# Patient Record
Sex: FEMALE | Race: WHITE | Employment: OTHER
[De-identification: names, ages, dates, MRNs, and addresses within clinical notes are randomized per-mention and may not be internally consistent; named-entity substitution may affect disease eponyms.]

---

## 2019-01-01 ENCOUNTER — HOME CARE VISIT (OUTPATIENT)
Dept: SCHEDULING | Facility: HOME HEALTH | Age: 84
End: 2019-01-01
Payer: MEDICARE

## 2019-01-01 ENCOUNTER — HOSPITAL ENCOUNTER (INPATIENT)
Age: 84
LOS: 3 days | Discharge: HOME HEALTH | DRG: 189 | End: 2019-05-20
Attending: EMERGENCY MEDICINE | Admitting: INTERNAL MEDICINE
Payer: MEDICARE

## 2019-01-01 ENCOUNTER — TELEPHONE (OUTPATIENT)
Dept: FAMILY MEDICINE CLINIC | Age: 84
End: 2019-01-01

## 2019-01-01 ENCOUNTER — DOCUMENTATION ONLY (OUTPATIENT)
Dept: FAMILY MEDICINE CLINIC | Age: 84
End: 2019-01-01

## 2019-01-01 ENCOUNTER — HOME CARE VISIT (OUTPATIENT)
Dept: HOSPICE | Facility: HOSPICE | Age: 84
End: 2019-01-01
Payer: MEDICARE

## 2019-01-01 ENCOUNTER — HOME VISIT (OUTPATIENT)
Dept: FAMILY MEDICINE CLINIC | Age: 84
End: 2019-01-01

## 2019-01-01 ENCOUNTER — HOSPICE ADMISSION (OUTPATIENT)
Dept: HOSPICE | Facility: HOSPICE | Age: 84
End: 2019-01-01
Payer: MEDICARE

## 2019-01-01 ENCOUNTER — HOME HEALTH ADMISSION (OUTPATIENT)
Dept: HOME HEALTH SERVICES | Facility: HOME HEALTH | Age: 84
End: 2019-01-01
Payer: MEDICARE

## 2019-01-01 ENCOUNTER — TELEPHONE (OUTPATIENT)
Dept: PALLATIVE CARE | Age: 84
End: 2019-01-01

## 2019-01-01 ENCOUNTER — APPOINTMENT (OUTPATIENT)
Dept: GENERAL RADIOLOGY | Age: 84
DRG: 812 | End: 2019-01-01
Attending: EMERGENCY MEDICINE
Payer: MEDICARE

## 2019-01-01 ENCOUNTER — APPOINTMENT (OUTPATIENT)
Dept: ULTRASOUND IMAGING | Age: 84
DRG: 189 | End: 2019-01-01
Attending: INTERNAL MEDICINE
Payer: MEDICARE

## 2019-01-01 ENCOUNTER — HOSPITAL ENCOUNTER (INPATIENT)
Age: 84
LOS: 1 days | Discharge: HOME | DRG: 812 | End: 2019-09-25
Attending: EMERGENCY MEDICINE | Admitting: HOSPITALIST
Payer: MEDICARE

## 2019-01-01 ENCOUNTER — HOSPITAL ENCOUNTER (EMERGENCY)
Age: 84
Discharge: HOME | End: 2019-11-28
Attending: EMERGENCY MEDICINE
Payer: OTHER MISCELLANEOUS

## 2019-01-01 ENCOUNTER — DOCUMENTATION ONLY (OUTPATIENT)
Dept: CASE MANAGEMENT | Age: 84
End: 2019-01-01

## 2019-01-01 ENCOUNTER — APPOINTMENT (OUTPATIENT)
Dept: NON INVASIVE DIAGNOSTICS | Age: 84
DRG: 812 | End: 2019-01-01
Attending: INTERNAL MEDICINE
Payer: MEDICARE

## 2019-01-01 ENCOUNTER — HOME CARE VISIT (OUTPATIENT)
Dept: HOME HEALTH SERVICES | Facility: HOME HEALTH | Age: 84
End: 2019-01-01
Payer: MEDICARE

## 2019-01-01 ENCOUNTER — APPOINTMENT (OUTPATIENT)
Dept: CT IMAGING | Age: 84
DRG: 189 | End: 2019-01-01
Attending: INTERNAL MEDICINE
Payer: MEDICARE

## 2019-01-01 ENCOUNTER — APPOINTMENT (OUTPATIENT)
Dept: CT IMAGING | Age: 84
DRG: 812 | End: 2019-01-01
Attending: EMERGENCY MEDICINE
Payer: MEDICARE

## 2019-01-01 ENCOUNTER — OFFICE VISIT (OUTPATIENT)
Dept: FAMILY MEDICINE CLINIC | Age: 84
End: 2019-01-01

## 2019-01-01 ENCOUNTER — APPOINTMENT (OUTPATIENT)
Dept: GENERAL RADIOLOGY | Age: 84
DRG: 189 | End: 2019-01-01
Attending: EMERGENCY MEDICINE
Payer: MEDICARE

## 2019-01-01 ENCOUNTER — APPOINTMENT (OUTPATIENT)
Dept: NON INVASIVE DIAGNOSTICS | Age: 84
DRG: 189 | End: 2019-01-01
Attending: EMERGENCY MEDICINE
Payer: MEDICARE

## 2019-01-01 VITALS
OXYGEN SATURATION: 93 % | HEART RATE: 86 BPM | RESPIRATION RATE: 19 BRPM | DIASTOLIC BLOOD PRESSURE: 52 MMHG | SYSTOLIC BLOOD PRESSURE: 100 MMHG

## 2019-01-01 VITALS
HEART RATE: 81 BPM | TEMPERATURE: 97.2 F | RESPIRATION RATE: 18 BRPM | DIASTOLIC BLOOD PRESSURE: 80 MMHG | OXYGEN SATURATION: 93 % | SYSTOLIC BLOOD PRESSURE: 110 MMHG

## 2019-01-01 VITALS
DIASTOLIC BLOOD PRESSURE: 58 MMHG | HEIGHT: 55 IN | BODY MASS INDEX: 46.29 KG/M2 | OXYGEN SATURATION: 97 % | RESPIRATION RATE: 22 BRPM | HEART RATE: 80 BPM | SYSTOLIC BLOOD PRESSURE: 107 MMHG | WEIGHT: 200 LBS

## 2019-01-01 VITALS
SYSTOLIC BLOOD PRESSURE: 140 MMHG | HEART RATE: 70 BPM | DIASTOLIC BLOOD PRESSURE: 70 MMHG | OXYGEN SATURATION: 97 % | RESPIRATION RATE: 22 BRPM

## 2019-01-01 VITALS
TEMPERATURE: 98.7 F | DIASTOLIC BLOOD PRESSURE: 60 MMHG | RESPIRATION RATE: 20 BRPM | HEART RATE: 81 BPM | OXYGEN SATURATION: 98 % | SYSTOLIC BLOOD PRESSURE: 120 MMHG

## 2019-01-01 VITALS
SYSTOLIC BLOOD PRESSURE: 122 MMHG | HEART RATE: 90 BPM | TEMPERATURE: 98.4 F | DIASTOLIC BLOOD PRESSURE: 72 MMHG | OXYGEN SATURATION: 90 % | RESPIRATION RATE: 18 BRPM

## 2019-01-01 VITALS
TEMPERATURE: 97.3 F | OXYGEN SATURATION: 94 % | RESPIRATION RATE: 20 BRPM | HEART RATE: 81 BPM | SYSTOLIC BLOOD PRESSURE: 150 MMHG | DIASTOLIC BLOOD PRESSURE: 80 MMHG

## 2019-01-01 VITALS
TEMPERATURE: 98.1 F | OXYGEN SATURATION: 95 % | DIASTOLIC BLOOD PRESSURE: 62 MMHG | HEART RATE: 84 BPM | RESPIRATION RATE: 18 BRPM | SYSTOLIC BLOOD PRESSURE: 118 MMHG

## 2019-01-01 VITALS
HEART RATE: 78 BPM | OXYGEN SATURATION: 99 % | RESPIRATION RATE: 17 BRPM | TEMPERATURE: 97.8 F | DIASTOLIC BLOOD PRESSURE: 68 MMHG | SYSTOLIC BLOOD PRESSURE: 142 MMHG

## 2019-01-01 VITALS
RESPIRATION RATE: 20 BRPM | HEART RATE: 82 BPM | TEMPERATURE: 98 F | SYSTOLIC BLOOD PRESSURE: 146 MMHG | OXYGEN SATURATION: 96 % | DIASTOLIC BLOOD PRESSURE: 82 MMHG

## 2019-01-01 VITALS
RESPIRATION RATE: 20 BRPM | OXYGEN SATURATION: 96 % | HEART RATE: 110 BPM | DIASTOLIC BLOOD PRESSURE: 60 MMHG | SYSTOLIC BLOOD PRESSURE: 110 MMHG

## 2019-01-01 VITALS
DIASTOLIC BLOOD PRESSURE: 70 MMHG | RESPIRATION RATE: 19 BRPM | HEART RATE: 83 BPM | OXYGEN SATURATION: 93 % | TEMPERATURE: 97.5 F | SYSTOLIC BLOOD PRESSURE: 145 MMHG

## 2019-01-01 VITALS
RESPIRATION RATE: 20 BRPM | TEMPERATURE: 97.3 F | HEART RATE: 58 BPM | SYSTOLIC BLOOD PRESSURE: 120 MMHG | OXYGEN SATURATION: 96 % | DIASTOLIC BLOOD PRESSURE: 67 MMHG

## 2019-01-01 VITALS
DIASTOLIC BLOOD PRESSURE: 64 MMHG | OXYGEN SATURATION: 97 % | SYSTOLIC BLOOD PRESSURE: 122 MMHG | HEART RATE: 82 BPM | RESPIRATION RATE: 20 BRPM

## 2019-01-01 VITALS
HEART RATE: 109 BPM | OXYGEN SATURATION: 97 % | DIASTOLIC BLOOD PRESSURE: 55 MMHG | RESPIRATION RATE: 14 BRPM | TEMPERATURE: 98.7 F | SYSTOLIC BLOOD PRESSURE: 110 MMHG

## 2019-01-01 VITALS
TEMPERATURE: 97.4 F | DIASTOLIC BLOOD PRESSURE: 60 MMHG | SYSTOLIC BLOOD PRESSURE: 130 MMHG | OXYGEN SATURATION: 96 % | HEART RATE: 80 BPM | RESPIRATION RATE: 18 BRPM

## 2019-01-01 VITALS
HEART RATE: 88 BPM | OXYGEN SATURATION: 90 % | TEMPERATURE: 98 F | HEIGHT: 55 IN | SYSTOLIC BLOOD PRESSURE: 130 MMHG | WEIGHT: 194.9 LBS | RESPIRATION RATE: 20 BRPM | DIASTOLIC BLOOD PRESSURE: 74 MMHG | BODY MASS INDEX: 45.11 KG/M2

## 2019-01-01 VITALS
OXYGEN SATURATION: 99 % | HEART RATE: 63 BPM | DIASTOLIC BLOOD PRESSURE: 60 MMHG | SYSTOLIC BLOOD PRESSURE: 118 MMHG | RESPIRATION RATE: 19 BRPM

## 2019-01-01 VITALS
DIASTOLIC BLOOD PRESSURE: 72 MMHG | RESPIRATION RATE: 12 BRPM | SYSTOLIC BLOOD PRESSURE: 116 MMHG | HEART RATE: 84 BPM | TEMPERATURE: 97.4 F | OXYGEN SATURATION: 97 %

## 2019-01-01 VITALS
OXYGEN SATURATION: 93 % | HEART RATE: 80 BPM | SYSTOLIC BLOOD PRESSURE: 145 MMHG | RESPIRATION RATE: 20 BRPM | TEMPERATURE: 97.5 F | DIASTOLIC BLOOD PRESSURE: 70 MMHG

## 2019-01-01 VITALS
RESPIRATION RATE: 20 BRPM | HEART RATE: 77 BPM | OXYGEN SATURATION: 95 % | SYSTOLIC BLOOD PRESSURE: 140 MMHG | DIASTOLIC BLOOD PRESSURE: 60 MMHG

## 2019-01-01 VITALS
RESPIRATION RATE: 14 BRPM | TEMPERATURE: 98.7 F | DIASTOLIC BLOOD PRESSURE: 70 MMHG | HEART RATE: 81 BPM | SYSTOLIC BLOOD PRESSURE: 122 MMHG | OXYGEN SATURATION: 96 %

## 2019-01-01 VITALS
SYSTOLIC BLOOD PRESSURE: 124 MMHG | OXYGEN SATURATION: 97 % | DIASTOLIC BLOOD PRESSURE: 62 MMHG | TEMPERATURE: 98.9 F | HEART RATE: 71 BPM | RESPIRATION RATE: 20 BRPM

## 2019-01-01 VITALS
SYSTOLIC BLOOD PRESSURE: 113 MMHG | DIASTOLIC BLOOD PRESSURE: 61 MMHG | OXYGEN SATURATION: 95 % | RESPIRATION RATE: 20 BRPM | HEART RATE: 93 BPM | TEMPERATURE: 98.3 F

## 2019-01-01 VITALS
DIASTOLIC BLOOD PRESSURE: 68 MMHG | HEART RATE: 84 BPM | OXYGEN SATURATION: 96 % | SYSTOLIC BLOOD PRESSURE: 118 MMHG | TEMPERATURE: 98 F

## 2019-01-01 VITALS
RESPIRATION RATE: 22 BRPM | OXYGEN SATURATION: 94 % | SYSTOLIC BLOOD PRESSURE: 130 MMHG | DIASTOLIC BLOOD PRESSURE: 80 MMHG | HEART RATE: 80 BPM

## 2019-01-01 VITALS
DIASTOLIC BLOOD PRESSURE: 58 MMHG | SYSTOLIC BLOOD PRESSURE: 114 MMHG | TEMPERATURE: 97.1 F | HEART RATE: 77 BPM | OXYGEN SATURATION: 96 %

## 2019-01-01 VITALS
HEART RATE: 105 BPM | RESPIRATION RATE: 16 BRPM | SYSTOLIC BLOOD PRESSURE: 125 MMHG | DIASTOLIC BLOOD PRESSURE: 56 MMHG | TEMPERATURE: 98.5 F | OXYGEN SATURATION: 98 %

## 2019-01-01 VITALS
TEMPERATURE: 98.6 F | SYSTOLIC BLOOD PRESSURE: 125 MMHG | RESPIRATION RATE: 16 BRPM | DIASTOLIC BLOOD PRESSURE: 67 MMHG | HEART RATE: 84 BPM

## 2019-01-01 VITALS
WEIGHT: 197 LBS | DIASTOLIC BLOOD PRESSURE: 64 MMHG | RESPIRATION RATE: 24 BRPM | HEART RATE: 80 BPM | BODY MASS INDEX: 45.79 KG/M2 | SYSTOLIC BLOOD PRESSURE: 138 MMHG | OXYGEN SATURATION: 97 %

## 2019-01-01 VITALS
RESPIRATION RATE: 18 BRPM | TEMPERATURE: 97.8 F | HEART RATE: 85 BPM | SYSTOLIC BLOOD PRESSURE: 128 MMHG | OXYGEN SATURATION: 94 % | DIASTOLIC BLOOD PRESSURE: 64 MMHG

## 2019-01-01 VITALS
HEART RATE: 80 BPM | OXYGEN SATURATION: 96 % | TEMPERATURE: 98.5 F | DIASTOLIC BLOOD PRESSURE: 70 MMHG | SYSTOLIC BLOOD PRESSURE: 120 MMHG

## 2019-01-01 VITALS — SYSTOLIC BLOOD PRESSURE: 127 MMHG | TEMPERATURE: 98.6 F | HEART RATE: 80 BPM | DIASTOLIC BLOOD PRESSURE: 68 MMHG

## 2019-01-01 VITALS — HEART RATE: 86 BPM | OXYGEN SATURATION: 95 % | DIASTOLIC BLOOD PRESSURE: 84 MMHG | SYSTOLIC BLOOD PRESSURE: 115 MMHG

## 2019-01-01 VITALS
HEIGHT: 55 IN | RESPIRATION RATE: 18 BRPM | WEIGHT: 197 LBS | OXYGEN SATURATION: 95 % | TEMPERATURE: 97.6 F | DIASTOLIC BLOOD PRESSURE: 58 MMHG | BODY MASS INDEX: 45.59 KG/M2 | HEART RATE: 86 BPM | SYSTOLIC BLOOD PRESSURE: 118 MMHG

## 2019-01-01 VITALS
HEART RATE: 68 BPM | SYSTOLIC BLOOD PRESSURE: 118 MMHG | RESPIRATION RATE: 14 BRPM | OXYGEN SATURATION: 96 % | TEMPERATURE: 97.2 F | DIASTOLIC BLOOD PRESSURE: 60 MMHG

## 2019-01-01 VITALS
DIASTOLIC BLOOD PRESSURE: 48 MMHG | TEMPERATURE: 98 F | HEART RATE: 86 BPM | BODY MASS INDEX: 41.19 KG/M2 | SYSTOLIC BLOOD PRESSURE: 110 MMHG | OXYGEN SATURATION: 98 % | WEIGHT: 196.21 LBS | RESPIRATION RATE: 16 BRPM | HEIGHT: 58 IN

## 2019-01-01 VITALS
HEART RATE: 96 BPM | SYSTOLIC BLOOD PRESSURE: 110 MMHG | RESPIRATION RATE: 14 BRPM | DIASTOLIC BLOOD PRESSURE: 72 MMHG | OXYGEN SATURATION: 97 %

## 2019-01-01 VITALS
OXYGEN SATURATION: 98 % | RESPIRATION RATE: 20 BRPM | DIASTOLIC BLOOD PRESSURE: 70 MMHG | HEART RATE: 81 BPM | SYSTOLIC BLOOD PRESSURE: 140 MMHG | TEMPERATURE: 97 F

## 2019-01-01 VITALS
OXYGEN SATURATION: 94 % | RESPIRATION RATE: 22 BRPM | TEMPERATURE: 98.2 F | DIASTOLIC BLOOD PRESSURE: 90 MMHG | HEART RATE: 82 BPM | SYSTOLIC BLOOD PRESSURE: 152 MMHG

## 2019-01-01 VITALS
DIASTOLIC BLOOD PRESSURE: 60 MMHG | SYSTOLIC BLOOD PRESSURE: 124 MMHG | RESPIRATION RATE: 18 BRPM | HEART RATE: 79 BPM | TEMPERATURE: 98.9 F | OXYGEN SATURATION: 94 %

## 2019-01-01 VITALS
OXYGEN SATURATION: 95 % | RESPIRATION RATE: 16 BRPM | HEART RATE: 84 BPM | DIASTOLIC BLOOD PRESSURE: 60 MMHG | SYSTOLIC BLOOD PRESSURE: 140 MMHG

## 2019-01-01 VITALS
OXYGEN SATURATION: 98 % | TEMPERATURE: 98.7 F | SYSTOLIC BLOOD PRESSURE: 112 MMHG | HEART RATE: 78 BPM | DIASTOLIC BLOOD PRESSURE: 56 MMHG

## 2019-01-01 VITALS
HEART RATE: 86 BPM | TEMPERATURE: 97.5 F | SYSTOLIC BLOOD PRESSURE: 140 MMHG | DIASTOLIC BLOOD PRESSURE: 62 MMHG | RESPIRATION RATE: 18 BRPM | OXYGEN SATURATION: 94 %

## 2019-01-01 VITALS
OXYGEN SATURATION: 96 % | HEART RATE: 86 BPM | SYSTOLIC BLOOD PRESSURE: 130 MMHG | TEMPERATURE: 97.8 F | RESPIRATION RATE: 16 BRPM | DIASTOLIC BLOOD PRESSURE: 68 MMHG

## 2019-01-01 VITALS
RESPIRATION RATE: 16 BRPM | HEART RATE: 84 BPM | DIASTOLIC BLOOD PRESSURE: 60 MMHG | OXYGEN SATURATION: 95 % | SYSTOLIC BLOOD PRESSURE: 140 MMHG

## 2019-01-01 DIAGNOSIS — D64.9 ANEMIA, UNSPECIFIED TYPE: ICD-10-CM

## 2019-01-01 DIAGNOSIS — I49.9 CARDIAC ARRHYTHMIA, UNSPECIFIED CARDIAC ARRHYTHMIA TYPE: ICD-10-CM

## 2019-01-01 DIAGNOSIS — J44.9 CHRONIC OBSTRUCTIVE PULMONARY DISEASE, UNSPECIFIED COPD TYPE (HCC): ICD-10-CM

## 2019-01-01 DIAGNOSIS — G31.84 MCI (MILD COGNITIVE IMPAIRMENT): ICD-10-CM

## 2019-01-01 DIAGNOSIS — E44.0 MODERATE PROTEIN-CALORIE MALNUTRITION (HCC): ICD-10-CM

## 2019-01-01 DIAGNOSIS — J44.9 CHRONIC OBSTRUCTIVE PULMONARY DISEASE, UNSPECIFIED COPD TYPE (HCC): Primary | ICD-10-CM

## 2019-01-01 DIAGNOSIS — J43.8 OTHER EMPHYSEMA (HCC): ICD-10-CM

## 2019-01-01 DIAGNOSIS — R06.02 SHORTNESS OF BREATH: ICD-10-CM

## 2019-01-01 DIAGNOSIS — R26.89 IMBALANCE: ICD-10-CM

## 2019-01-01 DIAGNOSIS — Z71.89 ADVANCE CARE PLANNING: ICD-10-CM

## 2019-01-01 DIAGNOSIS — R06.00 DYSPNEA, UNSPECIFIED TYPE: ICD-10-CM

## 2019-01-01 DIAGNOSIS — I71.9 AORTIC ANEURYSM WITHOUT RUPTURE, UNSPECIFIED PORTION OF AORTA (HCC): ICD-10-CM

## 2019-01-01 DIAGNOSIS — F17.200 CURRENT SMOKER: ICD-10-CM

## 2019-01-01 DIAGNOSIS — D50.8 OTHER IRON DEFICIENCY ANEMIA: Primary | ICD-10-CM

## 2019-01-01 DIAGNOSIS — E87.6 HYPOKALEMIA: ICD-10-CM

## 2019-01-01 DIAGNOSIS — I71.21 ASCENDING AORTIC ANEURYSM: ICD-10-CM

## 2019-01-01 DIAGNOSIS — I71.21 ASCENDING AORTIC ANEURYSM: Primary | ICD-10-CM

## 2019-01-01 DIAGNOSIS — Z00.00 INITIAL MEDICARE ANNUAL WELLNESS VISIT: ICD-10-CM

## 2019-01-01 DIAGNOSIS — Z01.89 ENCOUNTER FOR LABORATORY TEST: ICD-10-CM

## 2019-01-01 DIAGNOSIS — G47.00 INSOMNIA, UNSPECIFIED TYPE: ICD-10-CM

## 2019-01-01 DIAGNOSIS — E03.9 HYPOTHYROIDISM, UNSPECIFIED TYPE: ICD-10-CM

## 2019-01-01 DIAGNOSIS — Z13.31 SCREENING FOR DEPRESSION: ICD-10-CM

## 2019-01-01 DIAGNOSIS — R60.9 DEPENDENT EDEMA: ICD-10-CM

## 2019-01-01 DIAGNOSIS — D62 ACUTE BLOOD LOSS ANEMIA: ICD-10-CM

## 2019-01-01 DIAGNOSIS — R60.1 ANASARCA: ICD-10-CM

## 2019-01-01 DIAGNOSIS — E44.0 MODERATE PROTEIN-CALORIE MALNUTRITION (HCC): Primary | ICD-10-CM

## 2019-01-01 DIAGNOSIS — I71.9 DESCENDING AORTIC ANEURYSM (HCC): ICD-10-CM

## 2019-01-01 DIAGNOSIS — R09.02 HYPOXIA: ICD-10-CM

## 2019-01-01 DIAGNOSIS — K81.9 CHOLECYSTITIS: Primary | ICD-10-CM

## 2019-01-01 DIAGNOSIS — E03.9 ACQUIRED HYPOTHYROIDISM: ICD-10-CM

## 2019-01-01 DIAGNOSIS — M77.8 TENDONITIS OF SHOULDER, RIGHT: ICD-10-CM

## 2019-01-01 DIAGNOSIS — N17.9 ACUTE RENAL FAILURE, UNSPECIFIED ACUTE RENAL FAILURE TYPE (HCC): ICD-10-CM

## 2019-01-01 DIAGNOSIS — J44.1 ACUTE EXACERBATION OF CHRONIC OBSTRUCTIVE PULMONARY DISEASE (COPD) (HCC): Primary | ICD-10-CM

## 2019-01-01 DIAGNOSIS — M94.0 COSTOCHONDRITIS: ICD-10-CM

## 2019-01-01 DIAGNOSIS — D64.9 ANEMIA, UNSPECIFIED TYPE: Primary | ICD-10-CM

## 2019-01-01 DIAGNOSIS — I69.30 LATE EFFECT OF STROKE: ICD-10-CM

## 2019-01-01 DIAGNOSIS — I25.10 CORONARY ARTERY DISEASE INVOLVING NATIVE HEART, ANGINA PRESENCE UNSPECIFIED, UNSPECIFIED VESSEL OR LESION TYPE: ICD-10-CM

## 2019-01-01 DIAGNOSIS — R60.0 LOCALIZED EDEMA: ICD-10-CM

## 2019-01-01 DIAGNOSIS — R09.02 HYPOXIA: Primary | ICD-10-CM

## 2019-01-01 DIAGNOSIS — B35.1 ONYCHOMYCOSIS: ICD-10-CM

## 2019-01-01 DIAGNOSIS — J43.8 OTHER EMPHYSEMA (HCC): Primary | ICD-10-CM

## 2019-01-01 DIAGNOSIS — N18.9 CHRONIC KIDNEY DISEASE, UNSPECIFIED CKD STAGE: ICD-10-CM

## 2019-01-01 DIAGNOSIS — Z13.39 SCREENING FOR ALCOHOLISM: ICD-10-CM

## 2019-01-01 DIAGNOSIS — Z99.81 DEPENDENCE ON SUPPLEMENTAL OXYGEN: ICD-10-CM

## 2019-01-01 DIAGNOSIS — R06.02 SOB (SHORTNESS OF BREATH): ICD-10-CM

## 2019-01-01 DIAGNOSIS — J44.1 CHRONIC OBSTRUCTIVE PULMONARY DISEASE WITH ACUTE EXACERBATION (HCC): Primary | ICD-10-CM

## 2019-01-01 DIAGNOSIS — Z71.6 ENCOUNTER FOR TOBACCO USE CESSATION COUNSELING: ICD-10-CM

## 2019-01-01 DIAGNOSIS — R41.82 ALTERED MENTAL STATUS, UNSPECIFIED ALTERED MENTAL STATUS TYPE: ICD-10-CM

## 2019-01-01 LAB
ABO + RH BLD: NORMAL
ALBUMIN SERPL-MCNC: 2 G/DL (ref 3.5–5)
ALBUMIN SERPL-MCNC: 2.4 G/DL (ref 3.5–5)
ALBUMIN SERPL-MCNC: 2.5 G/DL (ref 3.5–4.7)
ALBUMIN SERPL-MCNC: 2.5 G/DL (ref 3.5–4.7)
ALBUMIN SERPL-MCNC: 2.6 G/DL (ref 3.5–5)
ALBUMIN SERPL-MCNC: 2.7 G/DL (ref 3.5–4.7)
ALBUMIN SERPL-MCNC: 2.7 G/DL (ref 3.5–4.7)
ALBUMIN SERPL-MCNC: 2.9 G/DL (ref 3.5–5)
ALBUMIN/GLOB SERPL: 0.5 {RATIO} (ref 1.1–2.2)
ALBUMIN/GLOB SERPL: 0.7 {RATIO} (ref 1.1–2.2)
ALBUMIN/GLOB SERPL: 1 {RATIO} (ref 1.2–2.2)
ALBUMIN/GLOB SERPL: 1.2 {RATIO} (ref 1.2–2.2)
ALBUMIN/GLOB SERPL: 1.2 {RATIO} (ref 1.2–2.2)
ALBUMIN/GLOB SERPL: 1.3 {RATIO} (ref 1.2–2.2)
ALP SERPL-CCNC: 100 IU/L (ref 39–117)
ALP SERPL-CCNC: 100 U/L (ref 45–117)
ALP SERPL-CCNC: 129 U/L (ref 45–117)
ALP SERPL-CCNC: 130 IU/L (ref 39–117)
ALP SERPL-CCNC: 131 U/L (ref 45–117)
ALP SERPL-CCNC: 142 IU/L (ref 39–117)
ALP SERPL-CCNC: 89 U/L (ref 45–117)
ALP SERPL-CCNC: 91 IU/L (ref 39–117)
ALT SERPL-CCNC: 12 U/L (ref 12–78)
ALT SERPL-CCNC: 12 U/L (ref 12–78)
ALT SERPL-CCNC: 13 U/L (ref 12–78)
ALT SERPL-CCNC: 14 IU/L (ref 0–32)
ALT SERPL-CCNC: 5 IU/L (ref 0–32)
ALT SERPL-CCNC: 6 IU/L (ref 0–32)
ALT SERPL-CCNC: 7 IU/L (ref 0–32)
ALT SERPL-CCNC: 8 U/L (ref 12–78)
ANION GAP SERPL CALC-SCNC: 3 MMOL/L (ref 5–15)
ANION GAP SERPL CALC-SCNC: 3 MMOL/L (ref 5–15)
ANION GAP SERPL CALC-SCNC: 5 MMOL/L (ref 5–15)
ANION GAP SERPL CALC-SCNC: 7 MMOL/L (ref 5–15)
ANION GAP SERPL CALC-SCNC: 7 MMOL/L (ref 5–15)
ANION GAP SERPL CALC-SCNC: 8 MMOL/L (ref 5–15)
AST SERPL-CCNC: 17 IU/L (ref 0–40)
AST SERPL-CCNC: 20 IU/L (ref 0–40)
AST SERPL-CCNC: 20 U/L (ref 15–37)
AST SERPL-CCNC: 27 IU/L (ref 0–40)
AST SERPL-CCNC: 31 IU/L (ref 0–40)
AST SERPL-CCNC: 35 U/L (ref 15–37)
AST SERPL-CCNC: 38 U/L (ref 15–37)
AST SERPL-CCNC: 46 U/L (ref 15–37)
ATRIAL RATE: 84 BPM
ATRIAL RATE: 87 BPM
BASOPHILS # BLD AUTO: 0.1 X10E3/UL (ref 0–0.2)
BASOPHILS # BLD: 0 K/UL (ref 0–0.1)
BASOPHILS # BLD: 0.1 K/UL (ref 0–0.1)
BASOPHILS # BLD: 0.2 K/UL (ref 0–0.1)
BASOPHILS NFR BLD AUTO: 2 %
BASOPHILS NFR BLD: 0 % (ref 0–1)
BASOPHILS NFR BLD: 2 % (ref 0–1)
BASOPHILS NFR BLD: 2 % (ref 0–1)
BILIRUB DIRECT SERPL-MCNC: 0.2 MG/DL (ref 0–0.2)
BILIRUB DIRECT SERPL-MCNC: 0.7 MG/DL (ref 0–0.2)
BILIRUB SERPL-MCNC: 1 MG/DL (ref 0–1.2)
BILIRUB SERPL-MCNC: 1 MG/DL (ref 0–1.2)
BILIRUB SERPL-MCNC: 1.2 MG/DL (ref 0–1.2)
BILIRUB SERPL-MCNC: 1.3 MG/DL (ref 0.2–1)
BILIRUB SERPL-MCNC: 1.3 MG/DL (ref 0.2–1)
BILIRUB SERPL-MCNC: 1.4 MG/DL (ref 0–1.2)
BILIRUB SERPL-MCNC: 1.9 MG/DL (ref 0.2–1)
BILIRUB SERPL-MCNC: 2.7 MG/DL (ref 0.2–1)
BLD PROD TYP BPU: NORMAL
BLOOD GROUP ANTIBODIES SERPL: NORMAL
BNP SERPL-MCNC: 173 PG/ML
BNP SERPL-MCNC: 226 PG/ML
BPU ID: NORMAL
BUN SERPL-MCNC: 10 MG/DL (ref 8–27)
BUN SERPL-MCNC: 11 MG/DL (ref 8–27)
BUN SERPL-MCNC: 12 MG/DL (ref 6–20)
BUN SERPL-MCNC: 12 MG/DL (ref 6–20)
BUN SERPL-MCNC: 13 MG/DL (ref 6–20)
BUN SERPL-MCNC: 14 MG/DL (ref 8–27)
BUN SERPL-MCNC: 15 MG/DL (ref 6–20)
BUN SERPL-MCNC: 22 MG/DL (ref 6–20)
BUN SERPL-MCNC: 25 MG/DL (ref 6–20)
BUN SERPL-MCNC: 8 MG/DL (ref 8–27)
BUN/CREAT SERPL: 13 (ref 12–28)
BUN/CREAT SERPL: 14 (ref 12–20)
BUN/CREAT SERPL: 14 (ref 12–28)
BUN/CREAT SERPL: 16 (ref 12–20)
BUN/CREAT SERPL: 17 (ref 12–28)
BUN/CREAT SERPL: 20 (ref 12–28)
BUN/CREAT SERPL: 22 (ref 12–20)
BUN/CREAT SERPL: 29 (ref 12–20)
CALCIUM SERPL-MCNC: 7.5 MG/DL (ref 8.5–10.1)
CALCIUM SERPL-MCNC: 7.7 MG/DL (ref 8.5–10.1)
CALCIUM SERPL-MCNC: 7.7 MG/DL (ref 8.5–10.1)
CALCIUM SERPL-MCNC: 7.8 MG/DL (ref 8.5–10.1)
CALCIUM SERPL-MCNC: 8.1 MG/DL (ref 8.7–10.3)
CALCIUM SERPL-MCNC: 8.2 MG/DL (ref 8.5–10.1)
CALCIUM SERPL-MCNC: 8.2 MG/DL (ref 8.5–10.1)
CALCIUM SERPL-MCNC: 8.2 MG/DL (ref 8.7–10.3)
CALCIUM SERPL-MCNC: 8.3 MG/DL (ref 8.7–10.3)
CALCIUM SERPL-MCNC: 8.8 MG/DL (ref 8.7–10.3)
CALCULATED P AXIS, ECG09: 6 DEGREES
CALCULATED P AXIS, ECG09: 78 DEGREES
CALCULATED R AXIS, ECG10: 24 DEGREES
CALCULATED R AXIS, ECG10: 25 DEGREES
CALCULATED T AXIS, ECG11: 139 DEGREES
CALCULATED T AXIS, ECG11: 68 DEGREES
CHLORIDE SERPL-SCNC: 101 MMOL/L (ref 96–106)
CHLORIDE SERPL-SCNC: 104 MMOL/L (ref 97–108)
CHLORIDE SERPL-SCNC: 105 MMOL/L (ref 96–106)
CHLORIDE SERPL-SCNC: 106 MMOL/L (ref 97–108)
CHLORIDE SERPL-SCNC: 107 MMOL/L (ref 97–108)
CHLORIDE SERPL-SCNC: 108 MMOL/L (ref 96–106)
CHLORIDE SERPL-SCNC: 109 MMOL/L (ref 97–108)
CHLORIDE SERPL-SCNC: 99 MMOL/L (ref 96–106)
CHOLEST SERPL-MCNC: 143 MG/DL
CO2 SERPL-SCNC: 24 MMOL/L (ref 20–29)
CO2 SERPL-SCNC: 24 MMOL/L (ref 21–32)
CO2 SERPL-SCNC: 25 MMOL/L (ref 20–29)
CO2 SERPL-SCNC: 29 MMOL/L (ref 20–29)
CO2 SERPL-SCNC: 29 MMOL/L (ref 21–32)
CO2 SERPL-SCNC: 30 MMOL/L (ref 21–32)
CO2 SERPL-SCNC: 31 MMOL/L (ref 21–32)
CO2 SERPL-SCNC: 33 MMOL/L (ref 20–29)
CO2 SERPL-SCNC: 34 MMOL/L (ref 21–32)
CO2 SERPL-SCNC: 35 MMOL/L (ref 21–32)
COMMENT, HOLDF: NORMAL
CREAT SERPL-MCNC: 0.56 MG/DL (ref 0.57–1)
CREAT SERPL-MCNC: 0.62 MG/DL (ref 0.57–1)
CREAT SERPL-MCNC: 0.73 MG/DL (ref 0.57–1)
CREAT SERPL-MCNC: 0.75 MG/DL (ref 0.55–1.02)
CREAT SERPL-MCNC: 0.77 MG/DL (ref 0.55–1.02)
CREAT SERPL-MCNC: 0.81 MG/DL (ref 0.57–1)
CREAT SERPL-MCNC: 0.85 MG/DL (ref 0.55–1.02)
CREAT SERPL-MCNC: 0.93 MG/DL (ref 0.55–1.02)
CREAT SERPL-MCNC: 0.95 MG/DL (ref 0.55–1.02)
CREAT SERPL-MCNC: 1 MG/DL (ref 0.55–1.02)
CROSSMATCH RESULT,%XM: NORMAL
D DIMER PPP FEU-MCNC: 3.03 MG/L FEU (ref 0–0.65)
DIAGNOSIS, 93000: NORMAL
DIAGNOSIS, 93000: NORMAL
DIFFERENTIAL METHOD BLD: ABNORMAL
ECHO AV AREA PEAK VELOCITY: 1.2 CM2
ECHO AV AREA PEAK VELOCITY: 3.9 CM2
ECHO AV AREA VTI: 1.5 CM2
ECHO AV AREA VTI: 2 CM2
ECHO AV AREA/BSA PEAK VELOCITY: 2.2 CM2/M2
ECHO AV AREA/BSA VTI: 1.1 CM2/M2
ECHO AV MEAN GRADIENT: 6.7 MMHG
ECHO AV MEAN GRADIENT: 9.5 MMHG
ECHO AV PEAK GRADIENT: 12.7 MMHG
ECHO AV PEAK GRADIENT: 4.8 MMHG
ECHO AV PEAK VELOCITY: 109.53 CM/S
ECHO AV PEAK VELOCITY: 178.39 CM/S
ECHO AV REGURGITANT PHT: 410.9 CM
ECHO AV REGURGITANT PHT: 603.3 CM
ECHO AV VTI: 39.07 CM
ECHO AV VTI: 42.91 CM
ECHO EST RA PRESSURE: 10 MMHG
ECHO EST RA PRESSURE: 10 MMHG
ECHO LA AREA 4C: 7.7 CM2
ECHO LA MAJOR AXIS: 3.48 CM
ECHO LA VOL 4C: 14.14 ML (ref 22–52)
ECHO LA VOLUME INDEX A4C: 7.83 ML/M2 (ref 16–28)
ECHO LV E' LATERAL VELOCITY: 10.53 CM/S
ECHO LV E' SEPTAL VELOCITY: 7.74 CM/S
ECHO LV INTERNAL DIMENSION DIASTOLIC MMODE: 5.41 CM
ECHO LV INTERNAL DIMENSION DIASTOLIC: 4.55 CM (ref 3.9–5.3)
ECHO LV INTERNAL DIMENSION SYSTOLIC MMODE: 2.91 CM
ECHO LV INTERNAL DIMENSION SYSTOLIC: 2.89 CM
ECHO LV IVSD MMODE: 1.37 CM
ECHO LV IVSD: 1.1 CM (ref 0.6–0.9)
ECHO LV IVSS MMODE: 2.05 CM
ECHO LV MASS 2D: 138.2 G (ref 67–162)
ECHO LV MASS INDEX 2D: 80.1 G/M2 (ref 43–95)
ECHO LV POSTERIOR WALL DIASTOLIC MMODE: 1.16 CM
ECHO LV POSTERIOR WALL DIASTOLIC: 0.57 CM (ref 0.6–0.9)
ECHO LV POSTERIOR WALL SYSTOLIC MMODE: 1.95 CM
ECHO LVOT CARDIAC OUTPUT: 5.3 L/MIN
ECHO LVOT DIAM: 1.76 CM
ECHO LVOT DIAM: 1.96 CM
ECHO LVOT PEAK GRADIENT: 3.1 MMHG
ECHO LVOT PEAK GRADIENT: 8.1 MMHG
ECHO LVOT PEAK VELOCITY: 141.92 CM/S
ECHO LVOT PEAK VELOCITY: 88.61 CM/S
ECHO LVOT SV: 60.2 ML
ECHO LVOT SV: 83.8 ML
ECHO LVOT VTI: 24.82 CM
ECHO LVOT VTI: 27.63 CM
ECHO MV A VELOCITY: 102.91 CM/S
ECHO MV A VELOCITY: 54.78 CM/S
ECHO MV AREA PHT: 5.1 CM2
ECHO MV E DECELERATION TIME (DT): 172.2 MS
ECHO MV E DECELERATION TIME (DT): 217.2 MS
ECHO MV E VELOCITY: 106.73 CM/S
ECHO MV E VELOCITY: 120.7 CM/S
ECHO MV E/A RATIO: 1.17
ECHO MV E/A RATIO: 1.95
ECHO MV E/E' LATERAL: 11.46
ECHO MV E/E' RATIO (AVERAGED): 13.53
ECHO MV E/E' SEPTAL: 15.59
ECHO MV PRESSURE HALF TIME (PHT): 43 MS
ECHO MV REGURGITANT PEAK GRADIENT: 30.2 MMHG
ECHO MV REGURGITANT PEAK VELOCITY: 274.7 CM/S
ECHO PULMONARY ARTERY SYSTOLIC PRESSURE (PASP): 45.2 MMHG
ECHO PULMONARY ARTERY SYSTOLIC PRESSURE (PASP): 55.7 MMHG
ECHO PV MAX VELOCITY: 96.19 CM/S
ECHO PV MAX VELOCITY: 97.46 CM/S
ECHO PV PEAK GRADIENT: 3.7 MMHG
ECHO PV PEAK GRADIENT: 3.8 MMHG
ECHO RA VOLUME: 18.5 ML
ECHO RIGHT VENTRICULAR SYSTOLIC PRESSURE (RVSP): 45.2 MMHG
ECHO RIGHT VENTRICULAR SYSTOLIC PRESSURE (RVSP): 55.7 MMHG
ECHO RV INTERNAL DIMENSION: 2.89 CM
ECHO TV A WAVE: 66.96 CM/S
ECHO TV E WAVE: 73.23 CM/S
ECHO TV EROA: 0.9
ECHO TV REGURGITANT MAX VELOCITY: 296.61 CM/S
ECHO TV REGURGITANT MAX VELOCITY: 337.86 CM/S
ECHO TV REGURGITANT PEAK GRADIENT: 35.2 MMHG
ECHO TV REGURGITANT PEAK GRADIENT: 45.7 MMHG
EOSINOPHIL # BLD AUTO: 0.1 X10E3/UL (ref 0–0.4)
EOSINOPHIL # BLD AUTO: 0.2 X10E3/UL (ref 0–0.4)
EOSINOPHIL # BLD AUTO: 0.2 X10E3/UL (ref 0–0.4)
EOSINOPHIL # BLD AUTO: 0.3 X10E3/UL (ref 0–0.4)
EOSINOPHIL # BLD: 0 K/UL (ref 0–0.4)
EOSINOPHIL # BLD: 0.1 K/UL (ref 0–0.4)
EOSINOPHIL # BLD: 0.4 K/UL (ref 0–0.4)
EOSINOPHIL NFR BLD AUTO: 2 %
EOSINOPHIL NFR BLD AUTO: 3 %
EOSINOPHIL NFR BLD AUTO: 3 %
EOSINOPHIL NFR BLD AUTO: 4 %
EOSINOPHIL NFR BLD: 0 % (ref 0–7)
EOSINOPHIL NFR BLD: 1 % (ref 0–7)
EOSINOPHIL NFR BLD: 5 % (ref 0–7)
ERYTHROCYTE [DISTWIDTH] IN BLOOD BY AUTOMATED COUNT: 15.9 % (ref 11.5–14.5)
ERYTHROCYTE [DISTWIDTH] IN BLOOD BY AUTOMATED COUNT: 15.9 % (ref 11.5–14.5)
ERYTHROCYTE [DISTWIDTH] IN BLOOD BY AUTOMATED COUNT: 16.2 % (ref 11.5–14.5)
ERYTHROCYTE [DISTWIDTH] IN BLOOD BY AUTOMATED COUNT: 16.3 % (ref 11.5–14.5)
ERYTHROCYTE [DISTWIDTH] IN BLOOD BY AUTOMATED COUNT: 17 % (ref 12.3–15.4)
ERYTHROCYTE [DISTWIDTH] IN BLOOD BY AUTOMATED COUNT: 18.5 % (ref 12.3–15.4)
ERYTHROCYTE [DISTWIDTH] IN BLOOD BY AUTOMATED COUNT: 19.6 % (ref 12.3–15.4)
ERYTHROCYTE [DISTWIDTH] IN BLOOD BY AUTOMATED COUNT: 19.7 % (ref 11.5–14.5)
ERYTHROCYTE [DISTWIDTH] IN BLOOD BY AUTOMATED COUNT: 20.2 % (ref 11.5–14.5)
ERYTHROCYTE [DISTWIDTH] IN BLOOD BY AUTOMATED COUNT: 21.5 % (ref 12.3–15.4)
ERYTHROCYTE [DISTWIDTH] IN BLOOD BY AUTOMATED COUNT: 22.9 % (ref 12.3–15.4)
ERYTHROCYTE [DISTWIDTH] IN BLOOD BY AUTOMATED COUNT: 24.5 % (ref 12.3–15.4)
EST. AVERAGE GLUCOSE BLD GHB EST-MCNC: NORMAL MG/DL
FERRITIN SERPL-MCNC: 28 NG/ML (ref 26–388)
FOLATE SERPL-MCNC: 8 NG/ML (ref 5–21)
GLOBULIN SER CALC-MCNC: 2 G/DL (ref 1.5–4.5)
GLOBULIN SER CALC-MCNC: 2.2 G/DL (ref 1.5–4.5)
GLOBULIN SER CALC-MCNC: 2.2 G/DL (ref 1.5–4.5)
GLOBULIN SER CALC-MCNC: 2.5 G/DL (ref 1.5–4.5)
GLOBULIN SER CALC-MCNC: 3.6 G/DL (ref 2–4)
GLOBULIN SER CALC-MCNC: 3.6 G/DL (ref 2–4)
GLOBULIN SER CALC-MCNC: 3.7 G/DL (ref 2–4)
GLOBULIN SER CALC-MCNC: 3.9 G/DL (ref 2–4)
GLUCOSE SERPL-MCNC: 106 MG/DL (ref 65–99)
GLUCOSE SERPL-MCNC: 106 MG/DL (ref 65–99)
GLUCOSE SERPL-MCNC: 109 MG/DL (ref 65–100)
GLUCOSE SERPL-MCNC: 130 MG/DL (ref 65–99)
GLUCOSE SERPL-MCNC: 132 MG/DL (ref 65–100)
GLUCOSE SERPL-MCNC: 133 MG/DL (ref 65–100)
GLUCOSE SERPL-MCNC: 133 MG/DL (ref 65–99)
GLUCOSE SERPL-MCNC: 138 MG/DL (ref 65–100)
GLUCOSE SERPL-MCNC: 143 MG/DL (ref 65–100)
GLUCOSE SERPL-MCNC: 83 MG/DL (ref 65–100)
HAPTOGLOB SERPL-MCNC: 85 MG/DL (ref 30–200)
HBA1C MFR BLD: 4.5 % (ref 4.2–6.3)
HCT VFR BLD AUTO: 20.8 % (ref 34–46.6)
HCT VFR BLD AUTO: 22.5 % (ref 34–46.6)
HCT VFR BLD AUTO: 24.3 % (ref 35–47)
HCT VFR BLD AUTO: 24.6 % (ref 35–47)
HCT VFR BLD AUTO: 24.9 % (ref 34–46.6)
HCT VFR BLD AUTO: 26.2 % (ref 34–46.6)
HCT VFR BLD AUTO: 26.2 % (ref 34–46.6)
HCT VFR BLD AUTO: 26.9 % (ref 34–46.6)
HCT VFR BLD AUTO: 34.8 % (ref 35–47)
HCT VFR BLD AUTO: 35.3 % (ref 35–47)
HCT VFR BLD AUTO: 38 % (ref 35–47)
HCT VFR BLD AUTO: 40.2 % (ref 35–47)
HDLC SERPL-MCNC: 48 MG/DL
HDLC SERPL: 3 {RATIO} (ref 0–5)
HGB BLD-MCNC: 11.1 G/DL (ref 11.5–16)
HGB BLD-MCNC: 11.3 G/DL (ref 11.5–16)
HGB BLD-MCNC: 12.1 G/DL (ref 11.5–16)
HGB BLD-MCNC: 13.3 G/DL (ref 11.5–16)
HGB BLD-MCNC: 6.5 G/DL (ref 11.1–15.9)
HGB BLD-MCNC: 7 G/DL (ref 11.5–16)
HGB BLD-MCNC: 7.3 G/DL (ref 11.1–15.9)
HGB BLD-MCNC: 7.4 G/DL (ref 11.5–16)
HGB BLD-MCNC: 7.8 G/DL (ref 11.1–15.9)
HGB BLD-MCNC: 7.9 G/DL (ref 11.1–15.9)
HGB BLD-MCNC: 8.1 G/DL (ref 11.1–15.9)
HGB BLD-MCNC: 8.5 G/DL (ref 11.1–15.9)
IMM GRANULOCYTES # BLD AUTO: 0 X10E3/UL (ref 0–0.1)
IMM GRANULOCYTES # BLD AUTO: 0.1 K/UL (ref 0–0.04)
IMM GRANULOCYTES # BLD AUTO: 0.1 X10E3/UL (ref 0–0.1)
IMM GRANULOCYTES # BLD AUTO: 0.1 X10E3/UL (ref 0–0.1)
IMM GRANULOCYTES NFR BLD AUTO: 1 %
IMM GRANULOCYTES NFR BLD AUTO: 1 % (ref 0–0.5)
IMM GRANULOCYTES NFR BLD AUTO: 2 %
IMM GRANULOCYTES NFR BLD AUTO: 2 %
INR PPP: 1.1 (ref 0.9–1.1)
IRON SATN MFR SERPL: 5 % (ref 20–50)
IRON SERPL-MCNC: 16 UG/DL (ref 35–150)
LDH SERPL L TO P-CCNC: 226 U/L (ref 81–246)
LDLC SERPL CALC-MCNC: 80 MG/DL (ref 0–100)
LIPID PROFILE,FLP: NORMAL
LYMPHOCYTES # BLD AUTO: 0.6 X10E3/UL (ref 0.7–3.1)
LYMPHOCYTES # BLD AUTO: 0.6 X10E3/UL (ref 0.7–3.1)
LYMPHOCYTES # BLD AUTO: 0.9 X10E3/UL (ref 0.7–3.1)
LYMPHOCYTES # BLD AUTO: 1 X10E3/UL (ref 0.7–3.1)
LYMPHOCYTES # BLD: 0.4 K/UL (ref 0.8–3.5)
LYMPHOCYTES # BLD: 0.4 K/UL (ref 0.8–3.5)
LYMPHOCYTES # BLD: 0.7 K/UL (ref 0.8–3.5)
LYMPHOCYTES # BLD: 0.8 K/UL (ref 0.8–3.5)
LYMPHOCYTES # BLD: 1.1 K/UL (ref 0.8–3.5)
LYMPHOCYTES NFR BLD AUTO: 10 %
LYMPHOCYTES NFR BLD AUTO: 15 %
LYMPHOCYTES NFR BLD AUTO: 20 %
LYMPHOCYTES NFR BLD AUTO: 9 %
LYMPHOCYTES NFR BLD: 10 % (ref 12–49)
LYMPHOCYTES NFR BLD: 13 % (ref 12–49)
LYMPHOCYTES NFR BLD: 3 % (ref 12–49)
LYMPHOCYTES NFR BLD: 3 % (ref 12–49)
LYMPHOCYTES NFR BLD: 6 % (ref 12–49)
MAGNESIUM SERPL-MCNC: 1.3 MG/DL (ref 1.6–2.4)
MAGNESIUM SERPL-MCNC: 1.5 MG/DL (ref 1.6–2.3)
MAGNESIUM SERPL-MCNC: 1.7 MG/DL (ref 1.6–2.3)
MAGNESIUM SERPL-MCNC: 1.8 MG/DL (ref 1.6–2.4)
MAGNESIUM SERPL-MCNC: 1.9 MG/DL (ref 1.6–2.4)
MAGNESIUM SERPL-MCNC: 1.9 MG/DL (ref 1.6–2.4)
MCH RBC QN AUTO: 21 PG (ref 26.6–33)
MCH RBC QN AUTO: 21.2 PG (ref 26–34)
MCH RBC QN AUTO: 22.6 PG (ref 26–34)
MCH RBC QN AUTO: 23.1 PG (ref 26.6–33)
MCH RBC QN AUTO: 24.7 PG (ref 26.6–33)
MCH RBC QN AUTO: 26 PG (ref 26.6–33)
MCH RBC QN AUTO: 26.5 PG (ref 26.6–33)
MCH RBC QN AUTO: 28.1 PG (ref 26.6–33)
MCH RBC QN AUTO: 30.2 PG (ref 26–34)
MCH RBC QN AUTO: 30.3 PG (ref 26–34)
MCH RBC QN AUTO: 30.4 PG (ref 26–34)
MCH RBC QN AUTO: 30.4 PG (ref 26–34)
MCHC RBC AUTO-ENTMCNC: 28.5 G/DL (ref 30–36.5)
MCHC RBC AUTO-ENTMCNC: 29.8 G/DL (ref 31.5–35.7)
MCHC RBC AUTO-ENTMCNC: 30.5 G/DL (ref 30–36.5)
MCHC RBC AUTO-ENTMCNC: 30.9 G/DL (ref 31.5–35.7)
MCHC RBC AUTO-ENTMCNC: 31.3 G/DL (ref 31.5–35.7)
MCHC RBC AUTO-ENTMCNC: 31.6 G/DL (ref 31.5–35.7)
MCHC RBC AUTO-ENTMCNC: 31.7 G/DL (ref 31.5–35.7)
MCHC RBC AUTO-ENTMCNC: 31.8 G/DL (ref 30–36.5)
MCHC RBC AUTO-ENTMCNC: 31.9 G/DL (ref 30–36.5)
MCHC RBC AUTO-ENTMCNC: 32 G/DL (ref 30–36.5)
MCHC RBC AUTO-ENTMCNC: 32.4 G/DL (ref 31.5–35.7)
MCHC RBC AUTO-ENTMCNC: 33.1 G/DL (ref 30–36.5)
MCV RBC AUTO: 67 FL (ref 79–97)
MCV RBC AUTO: 74.3 FL (ref 80–99)
MCV RBC AUTO: 74.5 FL (ref 80–99)
MCV RBC AUTO: 76 FL (ref 79–97)
MCV RBC AUTO: 78 FL (ref 79–97)
MCV RBC AUTO: 84 FL (ref 79–97)
MCV RBC AUTO: 84 FL (ref 79–97)
MCV RBC AUTO: 89 FL (ref 79–97)
MCV RBC AUTO: 92 FL (ref 80–99)
MCV RBC AUTO: 94.8 FL (ref 80–99)
MCV RBC AUTO: 94.9 FL (ref 80–99)
MCV RBC AUTO: 95.2 FL (ref 80–99)
MONOCYTES # BLD AUTO: 0.4 X10E3/UL (ref 0.1–0.9)
MONOCYTES # BLD AUTO: 0.7 X10E3/UL (ref 0.1–0.9)
MONOCYTES # BLD AUTO: 0.9 X10E3/UL (ref 0.1–0.9)
MONOCYTES # BLD AUTO: 1 X10E3/UL (ref 0.1–0.9)
MONOCYTES # BLD: 0.8 K/UL (ref 0–1)
MONOCYTES # BLD: 0.9 K/UL (ref 0–1)
MONOCYTES # BLD: 0.9 K/UL (ref 0–1)
MONOCYTES # BLD: 1.1 K/UL (ref 0–1)
MONOCYTES # BLD: 1.4 K/UL (ref 0–1)
MONOCYTES NFR BLD AUTO: 14 %
MONOCYTES NFR BLD AUTO: 15 %
MONOCYTES NFR BLD AUTO: 15 %
MONOCYTES NFR BLD AUTO: 7 %
MONOCYTES NFR BLD: 14 % (ref 5–13)
MONOCYTES NFR BLD: 17 % (ref 5–13)
MONOCYTES NFR BLD: 6 % (ref 5–13)
MONOCYTES NFR BLD: 8 % (ref 5–13)
MONOCYTES NFR BLD: 8 % (ref 5–13)
MORPHOLOGY BLD-IMP: ABNORMAL
MORPHOLOGY BLD-IMP: ABNORMAL
NEUTROPHILS # BLD AUTO: 2.9 X10E3/UL (ref 1.4–7)
NEUTROPHILS # BLD AUTO: 4.2 X10E3/UL (ref 1.4–7)
NEUTROPHILS # BLD AUTO: 4.4 X10E3/UL (ref 1.4–7)
NEUTROPHILS # BLD AUTO: 4.8 X10E3/UL (ref 1.4–7)
NEUTROPHILS NFR BLD AUTO: 59 %
NEUTROPHILS NFR BLD AUTO: 68 %
NEUTROPHILS NFR BLD AUTO: 69 %
NEUTROPHILS NFR BLD AUTO: 74 %
NEUTS SEG # BLD: 10.1 K/UL (ref 1.8–8)
NEUTS SEG # BLD: 11.9 K/UL (ref 1.8–8)
NEUTS SEG # BLD: 5.6 K/UL (ref 1.8–8)
NEUTS SEG # BLD: 5.8 K/UL (ref 1.8–8)
NEUTS SEG # BLD: 9.3 K/UL (ref 1.8–8)
NEUTS SEG NFR BLD: 65 % (ref 32–75)
NEUTS SEG NFR BLD: 69 % (ref 32–75)
NEUTS SEG NFR BLD: 85 % (ref 32–75)
NEUTS SEG NFR BLD: 88 % (ref 32–75)
NEUTS SEG NFR BLD: 90 % (ref 32–75)
NRBC # BLD: 0 K/UL (ref 0–0.01)
NRBC BLD-RTO: 0 PER 100 WBC
NT-PROBNP SERPL-MCNC: 191 PG/ML (ref 0–738)
NT-PROBNP SERPL-MCNC: 339 PG/ML (ref 0–738)
P-R INTERVAL, ECG05: 168 MS
P-R INTERVAL, ECG05: 182 MS
PHOSPHATE SERPL-MCNC: 2.2 MG/DL (ref 2.6–4.7)
PHOSPHATE SERPL-MCNC: 2.5 MG/DL (ref 2.6–4.7)
PHOSPHATE SERPL-MCNC: 3 MG/DL (ref 2.6–4.7)
PISA AR MAX VEL: 359.74 CM/S
PISA AR MAX VEL: 369.93 CM/S
PLATELET # BLD AUTO: 136 X10E3/UL (ref 150–450)
PLATELET # BLD AUTO: 152 K/UL (ref 150–400)
PLATELET # BLD AUTO: 179 K/UL (ref 150–400)
PLATELET # BLD AUTO: 192 X10E3/UL (ref 150–450)
PLATELET # BLD AUTO: 199 K/UL (ref 150–400)
PLATELET # BLD AUTO: 203 K/UL (ref 150–400)
PLATELET # BLD AUTO: 208 X10E3/UL (ref 150–450)
PLATELET # BLD AUTO: 218 K/UL (ref 150–400)
PLATELET # BLD AUTO: 226 X10E3/UL (ref 150–450)
PLATELET # BLD AUTO: 227 X10E3/UL (ref 150–450)
PLATELET # BLD AUTO: 237 X10E3/UL (ref 150–450)
PLATELET # BLD AUTO: 245 K/UL (ref 150–400)
PMV BLD AUTO: 10.2 FL (ref 8.9–12.9)
PMV BLD AUTO: 10.4 FL (ref 8.9–12.9)
PMV BLD AUTO: 10.9 FL (ref 8.9–12.9)
PMV BLD AUTO: 9.9 FL (ref 8.9–12.9)
POTASSIUM SERPL-SCNC: 2.3 MMOL/L (ref 3.5–5.1)
POTASSIUM SERPL-SCNC: 3 MMOL/L (ref 3.5–5.1)
POTASSIUM SERPL-SCNC: 3.3 MMOL/L (ref 3.5–5.2)
POTASSIUM SERPL-SCNC: 3.4 MMOL/L (ref 3.5–5.1)
POTASSIUM SERPL-SCNC: 3.4 MMOL/L (ref 3.5–5.2)
POTASSIUM SERPL-SCNC: 3.7 MMOL/L (ref 3.5–5.2)
POTASSIUM SERPL-SCNC: 3.8 MMOL/L (ref 3.5–5.1)
POTASSIUM SERPL-SCNC: 4.1 MMOL/L (ref 3.5–5.2)
PROT SERPL-MCNC: 4.5 G/DL (ref 6–8.5)
PROT SERPL-MCNC: 4.9 G/DL (ref 6–8.5)
PROT SERPL-MCNC: 4.9 G/DL (ref 6–8.5)
PROT SERPL-MCNC: 5 G/DL (ref 6–8.5)
PROT SERPL-MCNC: 5.7 G/DL (ref 6.4–8.2)
PROT SERPL-MCNC: 6 G/DL (ref 6.4–8.2)
PROT SERPL-MCNC: 6.2 G/DL (ref 6.4–8.2)
PROT SERPL-MCNC: 6.8 G/DL (ref 6.4–8.2)
PROTHROMBIN TIME: 11.7 SEC (ref 9–11.1)
Q-T INTERVAL, ECG07: 386 MS
Q-T INTERVAL, ECG07: 400 MS
QRS DURATION, ECG06: 84 MS
QRS DURATION, ECG06: 88 MS
QTC CALCULATION (BEZET), ECG08: 464 MS
QTC CALCULATION (BEZET), ECG08: 472 MS
RBC # BLD AUTO: 2.95 X10E6/UL (ref 3.77–5.28)
RBC # BLD AUTO: 2.98 X10E6/UL (ref 3.77–5.28)
RBC # BLD AUTO: 3.03 X10E6/UL (ref 3.77–5.28)
RBC # BLD AUTO: 3.1 X10E6/UL (ref 3.77–5.28)
RBC # BLD AUTO: 3.11 X10E6/UL (ref 3.77–5.28)
RBC # BLD AUTO: 3.27 M/UL (ref 3.8–5.2)
RBC # BLD AUTO: 3.3 M/UL (ref 3.8–5.2)
RBC # BLD AUTO: 3.38 X10E6/UL (ref 3.77–5.28)
RBC # BLD AUTO: 3.67 M/UL (ref 3.8–5.2)
RBC # BLD AUTO: 3.72 M/UL (ref 3.8–5.2)
RBC # BLD AUTO: 3.99 M/UL (ref 3.8–5.2)
RBC # BLD AUTO: 4.37 M/UL (ref 3.8–5.2)
RBC MORPH BLD: ABNORMAL
RETICS # AUTO: 0.12 M/UL (ref 0.02–0.08)
RETICS/RBC NFR AUTO: 3.6 % (ref 0.7–2.1)
SAMPLES BEING HELD,HOLD: NORMAL
SODIUM SERPL-SCNC: 139 MMOL/L (ref 136–145)
SODIUM SERPL-SCNC: 141 MMOL/L (ref 136–145)
SODIUM SERPL-SCNC: 141 MMOL/L (ref 136–145)
SODIUM SERPL-SCNC: 142 MMOL/L (ref 134–144)
SODIUM SERPL-SCNC: 142 MMOL/L (ref 136–145)
SODIUM SERPL-SCNC: 142 MMOL/L (ref 136–145)
SODIUM SERPL-SCNC: 144 MMOL/L (ref 134–144)
SODIUM SERPL-SCNC: 145 MMOL/L (ref 136–145)
SPECIMEN EXP DATE BLD: NORMAL
STATUS OF UNIT,%ST: NORMAL
TIBC SERPL-MCNC: 296 UG/DL (ref 250–450)
TRIGL SERPL-MCNC: 75 MG/DL (ref ?–150)
TROPONIN I SERPL-MCNC: 0.06 NG/ML
TROPONIN I SERPL-MCNC: 0.07 NG/ML
TSH SERPL DL<=0.005 MIU/L-ACNC: 5.33 UIU/ML (ref 0.45–4.5)
TSH SERPL DL<=0.05 MIU/L-ACNC: 33.8 UIU/ML (ref 0.36–3.74)
UNIT DIVISION, %UDIV: 0
VENTRICULAR RATE, ECG03: 84 BPM
VENTRICULAR RATE, ECG03: 87 BPM
VIT B12 SERPL-MCNC: 502 PG/ML (ref 193–986)
VLDLC SERPL CALC-MCNC: 15 MG/DL
WBC # BLD AUTO: 11 K/UL (ref 3.6–11)
WBC # BLD AUTO: 11.5 K/UL (ref 3.6–11)
WBC # BLD AUTO: 13.2 K/UL (ref 3.6–11)
WBC # BLD AUTO: 4.7 X10E3/UL (ref 3.4–10.8)
WBC # BLD AUTO: 4.9 X10E3/UL (ref 3.4–10.8)
WBC # BLD AUTO: 5.8 X10E3/UL (ref 3.4–10.8)
WBC # BLD AUTO: 6 X10E3/UL (ref 3.4–10.8)
WBC # BLD AUTO: 6 X10E3/UL (ref 3.4–10.8)
WBC # BLD AUTO: 6.9 X10E3/UL (ref 3.4–10.8)
WBC # BLD AUTO: 8.4 K/UL (ref 3.6–11)
WBC # BLD AUTO: 8.5 K/UL (ref 3.6–11)
WBC # BLD AUTO: 9 K/UL (ref 3.6–11)

## 2019-01-01 PROCEDURE — 80053 COMPREHEN METABOLIC PANEL: CPT

## 2019-01-01 PROCEDURE — G0299 HHS/HOSPICE OF RN EA 15 MIN: HCPCS

## 2019-01-01 PROCEDURE — 80061 LIPID PANEL: CPT

## 2019-01-01 PROCEDURE — 0651 HSPC ROUTINE HOME CARE

## 2019-01-01 PROCEDURE — 3331090001 HH PPS REVENUE CREDIT

## 2019-01-01 PROCEDURE — 74011000250 HC RX REV CODE- 250: Performed by: INTERNAL MEDICINE

## 2019-01-01 PROCEDURE — 74011250636 HC RX REV CODE- 250/636: Performed by: NURSE PRACTITIONER

## 2019-01-01 PROCEDURE — G0152 HHCP-SERV OF OT,EA 15 MIN: HCPCS

## 2019-01-01 PROCEDURE — 3331090002 HH PPS REVENUE DEBIT

## 2019-01-01 PROCEDURE — 36415 COLL VENOUS BLD VENIPUNCTURE: CPT

## 2019-01-01 PROCEDURE — HOSPICE MEDICATION HC HH HOSPICE MEDICATION

## 2019-01-01 PROCEDURE — A6250 SKIN SEAL PROTECT MOISTURIZR: HCPCS

## 2019-01-01 PROCEDURE — 74011250637 HC RX REV CODE- 250/637: Performed by: INTERNAL MEDICINE

## 2019-01-01 PROCEDURE — 99285 EMERGENCY DEPT VISIT HI MDM: CPT

## 2019-01-01 PROCEDURE — 83036 HEMOGLOBIN GLYCOSYLATED A1C: CPT

## 2019-01-01 PROCEDURE — 71275 CT ANGIOGRAPHY CHEST: CPT

## 2019-01-01 PROCEDURE — 85027 COMPLETE CBC AUTOMATED: CPT

## 2019-01-01 PROCEDURE — 83735 ASSAY OF MAGNESIUM: CPT

## 2019-01-01 PROCEDURE — A9270 NON-COVERED ITEM OR SERVICE: HCPCS

## 2019-01-01 PROCEDURE — G0300 HHS/HOSPICE OF LPN EA 15 MIN: HCPCS

## 2019-01-01 PROCEDURE — 74011250636 HC RX REV CODE- 250/636: Performed by: INTERNAL MEDICINE

## 2019-01-01 PROCEDURE — 94760 N-INVAS EAR/PLS OXIMETRY 1: CPT

## 2019-01-01 PROCEDURE — G0156 HHCP-SVS OF AIDE,EA 15 MIN: HCPCS

## 2019-01-01 PROCEDURE — 74011000250 HC RX REV CODE- 250: Performed by: NURSE PRACTITIONER

## 2019-01-01 PROCEDURE — G0155 HHCP-SVS OF CSW,EA 15 MIN: HCPCS

## 2019-01-01 PROCEDURE — 77010033678 HC OXYGEN DAILY

## 2019-01-01 PROCEDURE — 74011000250 HC RX REV CODE- 250: Performed by: EMERGENCY MEDICINE

## 2019-01-01 PROCEDURE — 94640 AIRWAY INHALATION TREATMENT: CPT

## 2019-01-01 PROCEDURE — 74011250637 HC RX REV CODE- 250/637: Performed by: EMERGENCY MEDICINE

## 2019-01-01 PROCEDURE — 74011250636 HC RX REV CODE- 250/636: Performed by: HOSPITALIST

## 2019-01-01 PROCEDURE — 30233N1 TRANSFUSION OF NONAUTOLOGOUS RED BLOOD CELLS INTO PERIPHERAL VEIN, PERCUTANEOUS APPROACH: ICD-10-PCS | Performed by: INTERNAL MEDICINE

## 2019-01-01 PROCEDURE — T4524 ADULT SIZE BRIEF/DIAPER XL: HCPCS

## 2019-01-01 PROCEDURE — 83880 ASSAY OF NATRIURETIC PEPTIDE: CPT

## 2019-01-01 PROCEDURE — 85025 COMPLETE CBC W/AUTO DIFF WBC: CPT

## 2019-01-01 PROCEDURE — 82728 ASSAY OF FERRITIN: CPT

## 2019-01-01 PROCEDURE — 97530 THERAPEUTIC ACTIVITIES: CPT

## 2019-01-01 PROCEDURE — 77030029684 HC NEB SM VOL KT MONA -A

## 2019-01-01 PROCEDURE — 84100 ASSAY OF PHOSPHORUS: CPT

## 2019-01-01 PROCEDURE — 97162 PT EVAL MOD COMPLEX 30 MIN: CPT

## 2019-01-01 PROCEDURE — 93306 TTE W/DOPPLER COMPLETE: CPT

## 2019-01-01 PROCEDURE — G0151 HHCP-SERV OF PT,EA 15 MIN: HCPCS

## 2019-01-01 PROCEDURE — 400013 HH SOC

## 2019-01-01 PROCEDURE — 82248 BILIRUBIN DIRECT: CPT

## 2019-01-01 PROCEDURE — 80048 BASIC METABOLIC PNL TOTAL CA: CPT

## 2019-01-01 PROCEDURE — 65660000000 HC RM CCU STEPDOWN

## 2019-01-01 PROCEDURE — 96374 THER/PROPH/DIAG INJ IV PUSH: CPT

## 2019-01-01 PROCEDURE — 80076 HEPATIC FUNCTION PANEL: CPT

## 2019-01-01 PROCEDURE — C9113 INJ PANTOPRAZOLE SODIUM, VIA: HCPCS | Performed by: NURSE PRACTITIONER

## 2019-01-01 PROCEDURE — 65270000029 HC RM PRIVATE

## 2019-01-01 PROCEDURE — 74011636320 HC RX REV CODE- 636/320: Performed by: INTERNAL MEDICINE

## 2019-01-01 PROCEDURE — G0157 HHC PT ASSISTANT EA 15: HCPCS

## 2019-01-01 PROCEDURE — 76705 ECHO EXAM OF ABDOMEN: CPT

## 2019-01-01 PROCEDURE — 3331090003 HH PPS REVENUE ADJ

## 2019-01-01 PROCEDURE — A4927 NON-STERILE GLOVES: HCPCS

## 2019-01-01 PROCEDURE — 3331090004 HSPC SERVICE INTENSITY ADD-ON

## 2019-01-01 PROCEDURE — 82746 ASSAY OF FOLIC ACID SERUM: CPT

## 2019-01-01 PROCEDURE — 74011636320 HC RX REV CODE- 636/320: Performed by: EMERGENCY MEDICINE

## 2019-01-01 PROCEDURE — 36430 TRANSFUSION BLD/BLD COMPNT: CPT

## 2019-01-01 PROCEDURE — 74011250636 HC RX REV CODE- 250/636: Performed by: EMERGENCY MEDICINE

## 2019-01-01 PROCEDURE — T4541 LARGE DISPOSABLE UNDERPAD: HCPCS

## 2019-01-01 PROCEDURE — 74011000250 HC RX REV CODE- 250: Performed by: HOSPITALIST

## 2019-01-01 PROCEDURE — 86900 BLOOD TYPING SEROLOGIC ABO: CPT

## 2019-01-01 PROCEDURE — 83615 LACTATE (LD) (LDH) ENZYME: CPT

## 2019-01-01 PROCEDURE — 99218 HC RM OBSERVATION: CPT

## 2019-01-01 PROCEDURE — 93005 ELECTROCARDIOGRAM TRACING: CPT

## 2019-01-01 PROCEDURE — 77030038269 HC DRN EXT URIN PURWCK BARD -A

## 2019-01-01 PROCEDURE — 85610 PROTHROMBIN TIME: CPT

## 2019-01-01 PROCEDURE — 94761 N-INVAS EAR/PLS OXIMETRY MLT: CPT

## 2019-01-01 PROCEDURE — 82607 VITAMIN B-12: CPT

## 2019-01-01 PROCEDURE — 77030032490 HC SLV COMPR SCD KNE COVD -B

## 2019-01-01 PROCEDURE — E0191 PROTECTOR HEEL OR ELBOW: HCPCS

## 2019-01-01 PROCEDURE — 3336500001 HSPC ELECTION

## 2019-01-01 PROCEDURE — 96360 HYDRATION IV INFUSION INIT: CPT

## 2019-01-01 PROCEDURE — 83010 ASSAY OF HAPTOGLOBIN QUANT: CPT

## 2019-01-01 PROCEDURE — 71045 X-RAY EXAM CHEST 1 VIEW: CPT

## 2019-01-01 PROCEDURE — 84484 ASSAY OF TROPONIN QUANT: CPT

## 2019-01-01 PROCEDURE — P9016 RBC LEUKOCYTES REDUCED: HCPCS

## 2019-01-01 PROCEDURE — P9047 ALBUMIN (HUMAN), 25%, 50ML: HCPCS | Performed by: HOSPITALIST

## 2019-01-01 PROCEDURE — 83540 ASSAY OF IRON: CPT

## 2019-01-01 PROCEDURE — 74011636637 HC RX REV CODE- 636/637: Performed by: EMERGENCY MEDICINE

## 2019-01-01 PROCEDURE — 85379 FIBRIN DEGRADATION QUANT: CPT

## 2019-01-01 PROCEDURE — 84443 ASSAY THYROID STIM HORMONE: CPT

## 2019-01-01 PROCEDURE — 86923 COMPATIBILITY TEST ELECTRIC: CPT

## 2019-01-01 PROCEDURE — 74011000258 HC RX REV CODE- 258: Performed by: INTERNAL MEDICINE

## 2019-01-01 PROCEDURE — 85045 AUTOMATED RETICULOCYTE COUNT: CPT

## 2019-01-01 RX ORDER — POTASSIUM CHLORIDE 7.45 MG/ML
10 INJECTION INTRAVENOUS
Status: DISCONTINUED | OUTPATIENT
Start: 2019-01-01 | End: 2019-01-01

## 2019-01-01 RX ORDER — ALBUTEROL SULFATE 90 UG/1
2 AEROSOL, METERED RESPIRATORY (INHALATION)
Qty: 1 INHALER | Refills: 0 | Status: SHIPPED | OUTPATIENT
Start: 2019-01-01

## 2019-01-01 RX ORDER — SODIUM CHLORIDE 0.9 % (FLUSH) 0.9 %
10 SYRINGE (ML) INJECTION
Status: COMPLETED | OUTPATIENT
Start: 2019-01-01 | End: 2019-01-01

## 2019-01-01 RX ORDER — BUDESONIDE AND FORMOTEROL FUMARATE DIHYDRATE 160; 4.5 UG/1; UG/1
2 AEROSOL RESPIRATORY (INHALATION) 2 TIMES DAILY
Qty: 2 INHALER | Refills: 1 | Status: SHIPPED | OUTPATIENT
Start: 2019-01-01 | End: 2019-01-01

## 2019-01-01 RX ORDER — LEVOTHYROXINE SODIUM 50 UG/1
TABLET ORAL
COMMUNITY
End: 2019-01-01 | Stop reason: SDUPTHER

## 2019-01-01 RX ORDER — POTASSIUM CHLORIDE 750 MG/1
40 TABLET, FILM COATED, EXTENDED RELEASE ORAL
Status: COMPLETED | OUTPATIENT
Start: 2019-01-01 | End: 2019-01-01

## 2019-01-01 RX ORDER — ONDANSETRON 2 MG/ML
4 INJECTION INTRAMUSCULAR; INTRAVENOUS
Status: DISCONTINUED | OUTPATIENT
Start: 2019-01-01 | End: 2019-01-01 | Stop reason: HOSPADM

## 2019-01-01 RX ORDER — IPRATROPIUM BROMIDE AND ALBUTEROL SULFATE 2.5; .5 MG/3ML; MG/3ML
3 SOLUTION RESPIRATORY (INHALATION)
Status: COMPLETED | OUTPATIENT
Start: 2019-01-01 | End: 2019-01-01

## 2019-01-01 RX ORDER — SODIUM CHLORIDE 0.9 % (FLUSH) 0.9 %
5-40 SYRINGE (ML) INJECTION EVERY 8 HOURS
Status: DISCONTINUED | OUTPATIENT
Start: 2019-01-01 | End: 2019-01-01 | Stop reason: HOSPADM

## 2019-01-01 RX ORDER — ALBUTEROL SULFATE 0.83 MG/ML
2.5 SOLUTION RESPIRATORY (INHALATION)
Status: DISCONTINUED | OUTPATIENT
Start: 2019-01-01 | End: 2019-01-01 | Stop reason: HOSPADM

## 2019-01-01 RX ORDER — ACETAMINOPHEN 325 MG/1
650 TABLET ORAL
Status: DISCONTINUED | OUTPATIENT
Start: 2019-01-01 | End: 2019-01-01 | Stop reason: HOSPADM

## 2019-01-01 RX ORDER — POTASSIUM CHLORIDE 750 MG/1
20 TABLET, FILM COATED, EXTENDED RELEASE ORAL DAILY
Status: DISCONTINUED | OUTPATIENT
Start: 2019-01-01 | End: 2019-01-01 | Stop reason: HOSPADM

## 2019-01-01 RX ORDER — PREDNISONE 10 MG/1
TABLET ORAL
Qty: 10 TAB | Refills: 0 | Status: SHIPPED | OUTPATIENT
Start: 2019-01-01 | End: 2019-01-01

## 2019-01-01 RX ORDER — LEVOTHYROXINE SODIUM 50 UG/1
50 TABLET ORAL
Qty: 30 TAB | Refills: 0 | Status: SHIPPED | OUTPATIENT
Start: 2019-01-01 | End: 2019-01-01

## 2019-01-01 RX ORDER — POTASSIUM CHLORIDE 750 MG/1
20 TABLET, FILM COATED, EXTENDED RELEASE ORAL ONCE
Status: COMPLETED | OUTPATIENT
Start: 2019-01-01 | End: 2019-01-01

## 2019-01-01 RX ORDER — ALBUTEROL SULFATE 0.63 MG/3ML
0.63 SOLUTION RESPIRATORY (INHALATION)
Qty: 120 VIAL | Refills: 0 | Status: SHIPPED | OUTPATIENT
Start: 2019-01-01 | End: 2019-01-01

## 2019-01-01 RX ORDER — LEVOTHYROXINE SODIUM 50 UG/1
50 TABLET ORAL
Status: DISCONTINUED | OUTPATIENT
Start: 2019-01-01 | End: 2019-01-01 | Stop reason: HOSPADM

## 2019-01-01 RX ORDER — ALBUMIN HUMAN 250 G/1000ML
25 SOLUTION INTRAVENOUS ONCE
Status: DISCONTINUED | OUTPATIENT
Start: 2019-01-01 | End: 2019-01-01

## 2019-01-01 RX ORDER — SODIUM CHLORIDE 9 MG/ML
250 INJECTION, SOLUTION INTRAVENOUS AS NEEDED
Status: DISCONTINUED | OUTPATIENT
Start: 2019-01-01 | End: 2019-01-01 | Stop reason: HOSPADM

## 2019-01-01 RX ORDER — ALBUTEROL SULFATE 0.63 MG/3ML
0.63 SOLUTION RESPIRATORY (INHALATION) DAILY
COMMUNITY
Start: 2019-01-01 | End: 2019-01-01 | Stop reason: SDUPTHER

## 2019-01-01 RX ORDER — POTASSIUM CHLORIDE 1500 MG/1
20 TABLET, FILM COATED, EXTENDED RELEASE ORAL DAILY
Qty: 30 TAB | Refills: 0 | Status: SHIPPED | OUTPATIENT
Start: 2019-01-01 | End: 2019-01-01 | Stop reason: DRUGHIGH

## 2019-01-01 RX ORDER — IPRATROPIUM BROMIDE AND ALBUTEROL SULFATE 2.5; .5 MG/3ML; MG/3ML
3 SOLUTION RESPIRATORY (INHALATION)
Status: DISCONTINUED | OUTPATIENT
Start: 2019-01-01 | End: 2019-01-01 | Stop reason: HOSPADM

## 2019-01-01 RX ORDER — POTASSIUM CHLORIDE 750 MG/1
40 TABLET, FILM COATED, EXTENDED RELEASE ORAL ONCE
Status: DISCONTINUED | OUTPATIENT
Start: 2019-01-01 | End: 2019-01-01

## 2019-01-01 RX ORDER — LANOLIN ALCOHOL/MO/W.PET/CERES
400 CREAM (GRAM) TOPICAL DAILY
Qty: 20 TAB | Refills: 2 | Status: SHIPPED | OUTPATIENT
Start: 2019-01-01

## 2019-01-01 RX ORDER — PREDNISONE 20 MG/1
40 TABLET ORAL
Status: DISCONTINUED | OUTPATIENT
Start: 2019-01-01 | End: 2019-01-01 | Stop reason: HOSPADM

## 2019-01-01 RX ORDER — ACETAMINOPHEN 325 MG/1
650 TABLET ORAL
Qty: 120 TAB | Refills: 11 | OUTPATIENT
Start: 2019-01-01 | End: 2019-01-01

## 2019-01-01 RX ORDER — HEPARIN SODIUM 5000 [USP'U]/ML
5000 INJECTION, SOLUTION INTRAVENOUS; SUBCUTANEOUS EVERY 8 HOURS
Status: DISCONTINUED | OUTPATIENT
Start: 2019-01-01 | End: 2019-01-01 | Stop reason: HOSPADM

## 2019-01-01 RX ORDER — POTASSIUM CHLORIDE 7.45 MG/ML
10 INJECTION INTRAVENOUS
Status: COMPLETED | OUTPATIENT
Start: 2019-01-01 | End: 2019-01-01

## 2019-01-01 RX ORDER — PANTOPRAZOLE SODIUM 40 MG/1
40 TABLET, DELAYED RELEASE ORAL 2 TIMES DAILY
Qty: 90 TAB | Refills: 0 | Status: SHIPPED | OUTPATIENT
Start: 2019-01-01

## 2019-01-01 RX ORDER — BUMETANIDE 1 MG/1
.25-.5 TABLET ORAL
Status: ON HOLD | COMMUNITY
End: 2019-01-01 | Stop reason: SDUPTHER

## 2019-01-01 RX ORDER — BUMETANIDE 1 MG/1
0.5 TABLET ORAL 2 TIMES DAILY
Qty: 30 TAB | Refills: 0 | Status: SHIPPED | OUTPATIENT
Start: 2019-01-01 | End: 2019-01-01 | Stop reason: SDUPTHER

## 2019-01-01 RX ORDER — ASPIRIN 81 MG/1
81 TABLET ORAL DAILY
Qty: 30 TAB | Refills: 0 | Status: SHIPPED | OUTPATIENT
Start: 2019-01-01 | End: 2019-01-01 | Stop reason: SDUPTHER

## 2019-01-01 RX ORDER — HALOPERIDOL 2 MG/ML
1 SOLUTION ORAL
Status: DISCONTINUED | OUTPATIENT
Start: 2019-01-01 | End: 2019-01-01 | Stop reason: HOSPADM

## 2019-01-01 RX ORDER — SODIUM CHLORIDE 0.9 % (FLUSH) 0.9 %
5-40 SYRINGE (ML) INJECTION AS NEEDED
Status: DISCONTINUED | OUTPATIENT
Start: 2019-01-01 | End: 2019-01-01 | Stop reason: HOSPADM

## 2019-01-01 RX ORDER — MAGNESIUM SULFATE HEPTAHYDRATE 40 MG/ML
2 INJECTION, SOLUTION INTRAVENOUS ONCE
Status: COMPLETED | OUTPATIENT
Start: 2019-01-01 | End: 2019-01-01

## 2019-01-01 RX ORDER — BUMETANIDE 0.25 MG/ML
1 INJECTION INTRAMUSCULAR; INTRAVENOUS
Status: DISCONTINUED | OUTPATIENT
Start: 2019-01-01 | End: 2019-01-01

## 2019-01-01 RX ORDER — LEVOTHYROXINE SODIUM 50 UG/1
50 TABLET ORAL
Qty: 90 TAB | Refills: 0 | Status: SHIPPED | COMMUNITY
Start: 2019-01-01

## 2019-01-01 RX ORDER — ALCLOMETASONE DIPROPIONATE 0.5 MG/G
CREAM TOPICAL 2 TIMES DAILY
Qty: 15 G | Refills: 2 | Status: SHIPPED | OUTPATIENT
Start: 2019-01-01 | End: 2019-01-01 | Stop reason: SDUPTHER

## 2019-01-01 RX ORDER — BUMETANIDE 1 MG/1
0.5 TABLET ORAL 2 TIMES DAILY
Qty: 30 TAB | Refills: 11 | Status: SHIPPED | OUTPATIENT
Start: 2019-01-01 | End: 2019-01-01

## 2019-01-01 RX ORDER — LANOLIN ALCOHOL/MO/W.PET/CERES
CREAM (GRAM) TOPICAL
COMMUNITY

## 2019-01-01 RX ORDER — ALBUTEROL SULFATE 0.63 MG/3ML
0.63 SOLUTION RESPIRATORY (INHALATION)
Qty: 270 NEBULE | Refills: 1 | Status: SHIPPED | COMMUNITY
Start: 2019-01-01 | End: 2019-01-01

## 2019-01-01 RX ORDER — ALBUMIN HUMAN 250 G/1000ML
25 SOLUTION INTRAVENOUS EVERY 6 HOURS
Status: DISCONTINUED | OUTPATIENT
Start: 2019-01-01 | End: 2019-01-01 | Stop reason: HOSPADM

## 2019-01-01 RX ORDER — PANTOPRAZOLE SODIUM 40 MG/1
40 TABLET, DELAYED RELEASE ORAL 2 TIMES DAILY
Qty: 60 TAB | Refills: 0 | Status: SHIPPED | OUTPATIENT
Start: 2019-01-01 | End: 2019-01-01 | Stop reason: SDUPTHER

## 2019-01-01 RX ORDER — BUMETANIDE 1 MG/1
0.5 TABLET ORAL 2 TIMES DAILY
Qty: 30 TAB | Refills: 0 | Status: SHIPPED | OUTPATIENT
Start: 2019-01-01

## 2019-01-01 RX ORDER — BUMETANIDE 1 MG/1
1 TABLET ORAL
Status: DISCONTINUED | OUTPATIENT
Start: 2019-01-01 | End: 2019-01-01

## 2019-01-01 RX ORDER — FUROSEMIDE 10 MG/ML
40 INJECTION INTRAMUSCULAR; INTRAVENOUS DAILY
Status: DISCONTINUED | OUTPATIENT
Start: 2019-01-01 | End: 2019-01-01 | Stop reason: HOSPADM

## 2019-01-01 RX ORDER — SENNOSIDES 8.6 MG/1
1 TABLET ORAL DAILY
COMMUNITY
Start: 2019-01-01

## 2019-01-01 RX ORDER — ASPIRIN 81 MG/1
81 TABLET ORAL DAILY
Qty: 30 TAB | Refills: 11 | Status: SHIPPED | OUTPATIENT
Start: 2019-01-01 | End: 2019-01-01

## 2019-01-01 RX ORDER — POTASSIUM CHLORIDE 7.45 MG/ML
10 INJECTION INTRAVENOUS
Status: DISPENSED | OUTPATIENT
Start: 2019-01-01 | End: 2019-01-01

## 2019-01-01 RX ORDER — ALCLOMETASONE DIPROPIONATE 0.5 MG/G
CREAM TOPICAL 2 TIMES DAILY
Qty: 15 G | Refills: 2 | Status: SHIPPED | COMMUNITY
Start: 2019-01-01 | End: 2019-01-01 | Stop reason: SDUPTHER

## 2019-01-01 RX ORDER — LEVOTHYROXINE SODIUM 50 UG/1
50 TABLET ORAL
COMMUNITY
End: 2019-01-01 | Stop reason: SDUPTHER

## 2019-01-01 RX ORDER — BUMETANIDE 1 MG/1
0.5 TABLET ORAL DAILY
COMMUNITY
Start: 2019-01-01 | End: 2019-01-01 | Stop reason: SDUPTHER

## 2019-01-01 RX ORDER — POTASSIUM CHLORIDE 1500 MG/1
20 TABLET, FILM COATED, EXTENDED RELEASE ORAL 2 TIMES DAILY
Qty: 60 TAB | Refills: 3 | Status: SHIPPED | OUTPATIENT
Start: 2019-01-01 | End: 2019-01-01

## 2019-01-01 RX ORDER — POTASSIUM CHLORIDE 1500 MG/1
20 TABLET, FILM COATED, EXTENDED RELEASE ORAL 2 TIMES DAILY
COMMUNITY

## 2019-01-01 RX ORDER — ALCLOMETASONE DIPROPIONATE 0.5 MG/G
CREAM TOPICAL 2 TIMES DAILY
Qty: 15 G | Refills: 2 | Status: SHIPPED | COMMUNITY
Start: 2019-01-01

## 2019-01-01 RX ORDER — FLUTICASONE PROPIONATE AND SALMETEROL 500; 50 UG/1; UG/1
1 POWDER RESPIRATORY (INHALATION) EVERY 12 HOURS
Qty: 60 EACH | Refills: 2 | Status: SHIPPED | OUTPATIENT
Start: 2019-01-01 | End: 2019-01-01

## 2019-01-01 RX ORDER — POTASSIUM CHLORIDE 750 MG/1
40 TABLET, FILM COATED, EXTENDED RELEASE ORAL ONCE
Status: COMPLETED | OUTPATIENT
Start: 2019-01-01 | End: 2019-01-01

## 2019-01-01 RX ORDER — LANOLIN ALCOHOL/MO/W.PET/CERES
400 CREAM (GRAM) TOPICAL DAILY
Status: DISCONTINUED | OUTPATIENT
Start: 2019-01-01 | End: 2019-01-01 | Stop reason: HOSPADM

## 2019-01-01 RX ORDER — MORPHINE SULFATE 2 MG/ML
2 INJECTION, SOLUTION INTRAMUSCULAR; INTRAVENOUS
Status: DISCONTINUED | OUTPATIENT
Start: 2019-01-01 | End: 2019-01-01 | Stop reason: HOSPADM

## 2019-01-01 RX ORDER — KETOCONAZOLE 20 MG/G
CREAM TOPICAL 2 TIMES DAILY
Qty: 60 G | Refills: 3 | Status: SHIPPED | OUTPATIENT
Start: 2019-01-01

## 2019-01-01 RX ORDER — LANOLIN ALCOHOL/MO/W.PET/CERES
400 CREAM (GRAM) TOPICAL DAILY
Qty: 7 TAB | Refills: 0 | Status: SHIPPED | OUTPATIENT
Start: 2019-01-01 | End: 2019-01-01

## 2019-01-01 RX ORDER — AZITHROMYCIN 250 MG/1
500 TABLET, FILM COATED ORAL DAILY
Status: COMPLETED | OUTPATIENT
Start: 2019-01-01 | End: 2019-01-01

## 2019-01-01 RX ORDER — ALCLOMETASONE DIPROPIONATE 0.5 MG/G
CREAM TOPICAL 2 TIMES DAILY
Qty: 15 G | Refills: 0 | Status: SHIPPED | OUTPATIENT
Start: 2019-01-01 | End: 2019-01-01 | Stop reason: SDUPTHER

## 2019-01-01 RX ORDER — AMOXICILLIN 250 MG
1 CAPSULE ORAL 2 TIMES DAILY
Status: DISPENSED | OUTPATIENT
Start: 2019-01-01 | End: 2019-01-01

## 2019-01-01 RX ORDER — FUROSEMIDE 10 MG/ML
40 INJECTION INTRAMUSCULAR; INTRAVENOUS ONCE
Status: COMPLETED | OUTPATIENT
Start: 2019-01-01 | End: 2019-01-01

## 2019-01-01 RX ORDER — LANOLIN ALCOHOL/MO/W.PET/CERES
325 CREAM (GRAM) TOPICAL
Qty: 90 TAB | Refills: 0 | Status: SHIPPED | OUTPATIENT
Start: 2019-01-01

## 2019-01-01 RX ORDER — ASPIRIN 81 MG/1
81 TABLET ORAL DAILY
Status: DISCONTINUED | OUTPATIENT
Start: 2019-01-01 | End: 2019-01-01 | Stop reason: HOSPADM

## 2019-01-01 RX ORDER — LANOLIN ALCOHOL/MO/W.PET/CERES
400 CREAM (GRAM) TOPICAL DAILY
Qty: 20 TAB | Refills: 0 | Status: SHIPPED | OUTPATIENT
Start: 2019-01-01 | End: 2019-01-01 | Stop reason: SDUPTHER

## 2019-01-01 RX ORDER — BUMETANIDE 0.25 MG/ML
0.5 INJECTION INTRAMUSCULAR; INTRAVENOUS EVERY 12 HOURS
Status: DISCONTINUED | OUTPATIENT
Start: 2019-01-01 | End: 2019-01-01 | Stop reason: HOSPADM

## 2019-01-01 RX ADMIN — ASPIRIN 81 MG: 81 TABLET, COATED ORAL at 10:33

## 2019-01-01 RX ADMIN — IPRATROPIUM BROMIDE AND ALBUTEROL SULFATE 3 ML: .5; 3 SOLUTION RESPIRATORY (INHALATION) at 07:13

## 2019-01-01 RX ADMIN — IPRATROPIUM BROMIDE AND ALBUTEROL SULFATE 3 ML: .5; 3 SOLUTION RESPIRATORY (INHALATION) at 11:20

## 2019-01-01 RX ADMIN — IOPAMIDOL 100 ML: 755 INJECTION, SOLUTION INTRAVENOUS at 21:01

## 2019-01-01 RX ADMIN — METHYLPREDNISOLONE SODIUM SUCCINATE 40 MG: 40 INJECTION, POWDER, FOR SOLUTION INTRAMUSCULAR; INTRAVENOUS at 03:17

## 2019-01-01 RX ADMIN — IPRATROPIUM BROMIDE AND ALBUTEROL SULFATE 3 ML: .5; 3 SOLUTION RESPIRATORY (INHALATION) at 15:37

## 2019-01-01 RX ADMIN — LEVOTHYROXINE SODIUM 50 MCG: 50 TABLET ORAL at 13:00

## 2019-01-01 RX ADMIN — IPRATROPIUM BROMIDE AND ALBUTEROL SULFATE 3 ML: .5; 3 SOLUTION RESPIRATORY (INHALATION) at 07:41

## 2019-01-01 RX ADMIN — UMECLIDINIUM BROMIDE AND VILANTEROL TRIFENATATE 1 PUFF: 62.5; 25 POWDER RESPIRATORY (INHALATION) at 13:33

## 2019-01-01 RX ADMIN — AZITHROMYCIN MONOHYDRATE 500 MG: 500 INJECTION, POWDER, LYOPHILIZED, FOR SOLUTION INTRAVENOUS at 15:24

## 2019-01-01 RX ADMIN — POTASSIUM CHLORIDE 20 MEQ: 750 TABLET, EXTENDED RELEASE ORAL at 09:09

## 2019-01-01 RX ADMIN — ASPIRIN 81 MG: 81 TABLET, COATED ORAL at 09:05

## 2019-01-01 RX ADMIN — BUMETANIDE 0.5 MG: 0.25 INJECTION INTRAMUSCULAR; INTRAVENOUS at 10:36

## 2019-01-01 RX ADMIN — POTASSIUM CHLORIDE 10 MEQ: 10 INJECTION, SOLUTION INTRAVENOUS at 11:31

## 2019-01-01 RX ADMIN — POTASSIUM CHLORIDE 20 MEQ: 750 TABLET, EXTENDED RELEASE ORAL at 11:02

## 2019-01-01 RX ADMIN — METHYLPREDNISOLONE SODIUM SUCCINATE 40 MG: 40 INJECTION, POWDER, FOR SOLUTION INTRAMUSCULAR; INTRAVENOUS at 16:49

## 2019-01-01 RX ADMIN — SODIUM CHLORIDE 40 MG: 9 INJECTION, SOLUTION INTRAMUSCULAR; INTRAVENOUS; SUBCUTANEOUS at 14:14

## 2019-01-01 RX ADMIN — METHYLPREDNISOLONE SODIUM SUCCINATE 40 MG: 40 INJECTION, POWDER, FOR SOLUTION INTRAMUSCULAR; INTRAVENOUS at 21:20

## 2019-01-01 RX ADMIN — IPRATROPIUM BROMIDE AND ALBUTEROL SULFATE 3 ML: .5; 3 SOLUTION RESPIRATORY (INHALATION) at 07:46

## 2019-01-01 RX ADMIN — AZITHROMYCIN MONOHYDRATE 500 MG: 250 TABLET ORAL at 13:16

## 2019-01-01 RX ADMIN — AZITHROMYCIN MONOHYDRATE 500 MG: 500 INJECTION, POWDER, LYOPHILIZED, FOR SOLUTION INTRAVENOUS at 11:31

## 2019-01-01 RX ADMIN — METHYLPREDNISOLONE SODIUM SUCCINATE 125 MG: 125 INJECTION, POWDER, FOR SOLUTION INTRAMUSCULAR; INTRAVENOUS at 08:53

## 2019-01-01 RX ADMIN — Medication 10 ML: at 14:14

## 2019-01-01 RX ADMIN — Medication 10 ML: at 21:01

## 2019-01-01 RX ADMIN — POTASSIUM CHLORIDE 10 MEQ: 10 INJECTION, SOLUTION INTRAVENOUS at 16:18

## 2019-01-01 RX ADMIN — LEVOTHYROXINE SODIUM 50 MCG: 50 TABLET ORAL at 06:39

## 2019-01-01 RX ADMIN — POTASSIUM CHLORIDE 20 MEQ: 750 TABLET, EXTENDED RELEASE ORAL at 11:31

## 2019-01-01 RX ADMIN — POTASSIUM CHLORIDE 10 MEQ: 10 INJECTION, SOLUTION INTRAVENOUS at 08:59

## 2019-01-01 RX ADMIN — BUMETANIDE 0.5 MG: 0.25 INJECTION INTRAMUSCULAR; INTRAVENOUS at 21:49

## 2019-01-01 RX ADMIN — BUMETANIDE 0.5 MG: 0.25 INJECTION INTRAMUSCULAR; INTRAVENOUS at 21:34

## 2019-01-01 RX ADMIN — BUMETANIDE 1 MG: 1 TABLET ORAL at 06:02

## 2019-01-01 RX ADMIN — IPRATROPIUM BROMIDE AND ALBUTEROL SULFATE 3 ML: .5; 3 SOLUTION RESPIRATORY (INHALATION) at 19:51

## 2019-01-01 RX ADMIN — HEPARIN SODIUM 5000 UNITS: 5000 INJECTION INTRAVENOUS; SUBCUTANEOUS at 16:55

## 2019-01-01 RX ADMIN — POTASSIUM CHLORIDE 10 MEQ: 10 INJECTION, SOLUTION INTRAVENOUS at 04:43

## 2019-01-01 RX ADMIN — IRON SUCROSE 100 MG: 20 INJECTION, SOLUTION INTRAVENOUS at 14:14

## 2019-01-01 RX ADMIN — MAGNESIUM OXIDE TAB 400 MG (241.3 MG ELEMENTAL MG) 400 MG: 400 (241.3 MG) TAB at 09:06

## 2019-01-01 RX ADMIN — HEPARIN SODIUM 5000 UNITS: 5000 INJECTION INTRAVENOUS; SUBCUTANEOUS at 17:33

## 2019-01-01 RX ADMIN — HEPARIN SODIUM 5000 UNITS: 5000 INJECTION INTRAVENOUS; SUBCUTANEOUS at 11:32

## 2019-01-01 RX ADMIN — HEPARIN SODIUM 5000 UNITS: 5000 INJECTION INTRAVENOUS; SUBCUTANEOUS at 10:43

## 2019-01-01 RX ADMIN — Medication 10 ML: at 16:45

## 2019-01-01 RX ADMIN — Medication 10 ML: at 13:02

## 2019-01-01 RX ADMIN — BUMETANIDE 0.5 MG: 0.25 INJECTION INTRAMUSCULAR; INTRAVENOUS at 15:25

## 2019-01-01 RX ADMIN — POTASSIUM CHLORIDE 10 MEQ: 7.46 INJECTION, SOLUTION INTRAVENOUS at 00:39

## 2019-01-01 RX ADMIN — LEVOTHYROXINE SODIUM 50 MCG: 50 TABLET ORAL at 06:02

## 2019-01-01 RX ADMIN — Medication 10 ML: at 02:06

## 2019-01-01 RX ADMIN — Medication 10 ML: at 21:53

## 2019-01-01 RX ADMIN — IPRATROPIUM BROMIDE AND ALBUTEROL SULFATE 3 ML: .5; 3 SOLUTION RESPIRATORY (INHALATION) at 15:23

## 2019-01-01 RX ADMIN — DIBASIC SODIUM PHOSPHATE, MONOBASIC POTASSIUM PHOSPHATE AND MONOBASIC SODIUM PHOSPHATE 1 TABLET: 852; 155; 130 TABLET ORAL at 09:05

## 2019-01-01 RX ADMIN — HEPARIN SODIUM 5000 UNITS: 5000 INJECTION INTRAVENOUS; SUBCUTANEOUS at 02:06

## 2019-01-01 RX ADMIN — ASPIRIN 81 MG: 81 TABLET, COATED ORAL at 11:31

## 2019-01-01 RX ADMIN — HEPARIN SODIUM 5000 UNITS: 5000 INJECTION INTRAVENOUS; SUBCUTANEOUS at 02:43

## 2019-01-01 RX ADMIN — ALBUMIN (HUMAN) 25 G: 0.25 INJECTION, SOLUTION INTRAVENOUS at 23:40

## 2019-01-01 RX ADMIN — ALBUTEROL SULFATE 2.5 MG: 2.5 SOLUTION RESPIRATORY (INHALATION) at 06:27

## 2019-01-01 RX ADMIN — Medication 10 ML: at 06:07

## 2019-01-01 RX ADMIN — METHYLPREDNISOLONE SODIUM SUCCINATE 40 MG: 40 INJECTION, POWDER, FOR SOLUTION INTRAMUSCULAR; INTRAVENOUS at 19:15

## 2019-01-01 RX ADMIN — IPRATROPIUM BROMIDE AND ALBUTEROL SULFATE 3 ML: .5; 3 SOLUTION RESPIRATORY (INHALATION) at 11:21

## 2019-01-01 RX ADMIN — HEPARIN SODIUM 5000 UNITS: 5000 INJECTION INTRAVENOUS; SUBCUTANEOUS at 11:00

## 2019-01-01 RX ADMIN — Medication 10 ML: at 10:51

## 2019-01-01 RX ADMIN — HEPARIN SODIUM 5000 UNITS: 5000 INJECTION INTRAVENOUS; SUBCUTANEOUS at 18:30

## 2019-01-01 RX ADMIN — POTASSIUM CHLORIDE 10 MEQ: 7.46 INJECTION, SOLUTION INTRAVENOUS at 23:40

## 2019-01-01 RX ADMIN — Medication 10 ML: at 15:24

## 2019-01-01 RX ADMIN — METHYLPREDNISOLONE SODIUM SUCCINATE 40 MG: 40 INJECTION, POWDER, FOR SOLUTION INTRAMUSCULAR; INTRAVENOUS at 02:43

## 2019-01-01 RX ADMIN — MAGNESIUM OXIDE TAB 400 MG (241.3 MG ELEMENTAL MG) 400 MG: 400 (241.3 MG) TAB at 13:39

## 2019-01-01 RX ADMIN — METHYLPREDNISOLONE SODIUM SUCCINATE 40 MG: 40 INJECTION, POWDER, FOR SOLUTION INTRAMUSCULAR; INTRAVENOUS at 11:00

## 2019-01-01 RX ADMIN — Medication 10 ML: at 21:19

## 2019-01-01 RX ADMIN — ASPIRIN 81 MG: 81 TABLET, COATED ORAL at 10:37

## 2019-01-01 RX ADMIN — POTASSIUM CHLORIDE 40 MEQ: 750 TABLET, EXTENDED RELEASE ORAL at 04:42

## 2019-01-01 RX ADMIN — LACTULOSE 30 ML: 20 SOLUTION ORAL at 11:32

## 2019-01-01 RX ADMIN — POTASSIUM CHLORIDE 40 MEQ: 750 TABLET, FILM COATED, EXTENDED RELEASE ORAL at 23:52

## 2019-01-01 RX ADMIN — HEPARIN SODIUM 5000 UNITS: 5000 INJECTION INTRAVENOUS; SUBCUTANEOUS at 10:36

## 2019-01-01 RX ADMIN — BUMETANIDE 1 MG: 1 TABLET ORAL at 13:00

## 2019-01-01 RX ADMIN — PREDNISONE 40 MG: 20 TABLET ORAL at 13:16

## 2019-01-01 RX ADMIN — POTASSIUM CHLORIDE 10 MEQ: 7.46 INJECTION, SOLUTION INTRAVENOUS at 02:47

## 2019-01-01 RX ADMIN — IPRATROPIUM BROMIDE AND ALBUTEROL SULFATE 3 ML: .5; 3 SOLUTION RESPIRATORY (INHALATION) at 08:53

## 2019-01-01 RX ADMIN — POTASSIUM CHLORIDE 10 MEQ: 7.46 INJECTION, SOLUTION INTRAVENOUS at 01:40

## 2019-01-01 RX ADMIN — FUROSEMIDE 40 MG: 10 INJECTION, SOLUTION INTRAVENOUS at 20:23

## 2019-01-01 RX ADMIN — POTASSIUM CHLORIDE 20 MEQ: 750 TABLET, EXTENDED RELEASE ORAL at 10:34

## 2019-01-01 RX ADMIN — Medication 10 ML: at 05:24

## 2019-01-01 RX ADMIN — POTASSIUM CHLORIDE 40 MEQ: 750 TABLET, EXTENDED RELEASE ORAL at 13:40

## 2019-01-01 RX ADMIN — FUROSEMIDE 40 MG: 10 INJECTION, SOLUTION INTRAMUSCULAR; INTRAVENOUS at 08:37

## 2019-01-01 RX ADMIN — IPRATROPIUM BROMIDE AND ALBUTEROL SULFATE 3 ML: .5; 3 SOLUTION RESPIRATORY (INHALATION) at 07:43

## 2019-01-01 RX ADMIN — MAGNESIUM SULFATE HEPTAHYDRATE 2 G: 40 INJECTION, SOLUTION INTRAVENOUS at 23:53

## 2019-01-01 RX ADMIN — METHYLPREDNISOLONE SODIUM SUCCINATE 40 MG: 40 INJECTION, POWDER, FOR SOLUTION INTRAMUSCULAR; INTRAVENOUS at 16:14

## 2019-01-01 RX ADMIN — IPRATROPIUM BROMIDE AND ALBUTEROL SULFATE 3 ML: .5; 3 SOLUTION RESPIRATORY (INHALATION) at 15:04

## 2019-01-01 RX ADMIN — IPRATROPIUM BROMIDE AND ALBUTEROL SULFATE 3 ML: .5; 3 SOLUTION RESPIRATORY (INHALATION) at 11:48

## 2019-01-01 RX ADMIN — METHYLPREDNISOLONE SODIUM SUCCINATE 40 MG: 40 INJECTION, POWDER, FOR SOLUTION INTRAMUSCULAR; INTRAVENOUS at 10:35

## 2019-01-01 RX ADMIN — IPRATROPIUM BROMIDE AND ALBUTEROL SULFATE 3 ML: .5; 3 SOLUTION RESPIRATORY (INHALATION) at 20:23

## 2019-01-01 RX ADMIN — ALBUMIN (HUMAN) 25 G: 0.25 INJECTION, SOLUTION INTRAVENOUS at 06:03

## 2019-01-01 RX ADMIN — IPRATROPIUM BROMIDE AND ALBUTEROL SULFATE 3 ML: .5; 3 SOLUTION RESPIRATORY (INHALATION) at 19:45

## 2019-01-01 RX ADMIN — METHYLPREDNISOLONE SODIUM SUCCINATE 40 MG: 40 INJECTION, POWDER, FOR SOLUTION INTRAMUSCULAR; INTRAVENOUS at 21:52

## 2019-01-01 RX ADMIN — AZITHROMYCIN MONOHYDRATE 500 MG: 500 INJECTION, POWDER, LYOPHILIZED, FOR SOLUTION INTRAVENOUS at 16:45

## 2019-01-01 RX ADMIN — Medication 10 ML: at 10:38

## 2019-01-01 RX ADMIN — Medication 10 ML: at 21:35

## 2019-01-01 RX ADMIN — BUMETANIDE 0.5 MG: 0.25 INJECTION INTRAMUSCULAR; INTRAVENOUS at 09:06

## 2019-01-01 RX ADMIN — SENNOSIDES AND DOCUSATE SODIUM 1 TABLET: 8.6; 5 TABLET ORAL at 13:01

## 2019-01-01 RX ADMIN — POTASSIUM CHLORIDE 20 MEQ: 750 TABLET, EXTENDED RELEASE ORAL at 10:37

## 2019-01-01 RX ADMIN — POTASSIUM CHLORIDE 40 MEQ: 750 TABLET, EXTENDED RELEASE ORAL at 13:00

## 2019-01-01 RX ADMIN — IPRATROPIUM BROMIDE AND ALBUTEROL SULFATE 3 ML: .5; 3 SOLUTION RESPIRATORY (INHALATION) at 15:22

## 2019-01-01 RX ADMIN — ALBUMIN (HUMAN) 25 G: 0.25 INJECTION, SOLUTION INTRAVENOUS at 14:14

## 2019-01-01 RX ADMIN — IPRATROPIUM BROMIDE AND ALBUTEROL SULFATE 3 ML: .5; 3 SOLUTION RESPIRATORY (INHALATION) at 11:01

## 2019-01-01 RX ADMIN — HEPARIN SODIUM 5000 UNITS: 5000 INJECTION INTRAVENOUS; SUBCUTANEOUS at 01:33

## 2019-01-01 RX ADMIN — METHYLPREDNISOLONE SODIUM SUCCINATE 40 MG: 40 INJECTION, POWDER, FOR SOLUTION INTRAMUSCULAR; INTRAVENOUS at 02:06

## 2019-01-01 RX ADMIN — IOPAMIDOL 100 ML: 755 INJECTION, SOLUTION INTRAVENOUS at 10:51

## 2019-01-01 RX ADMIN — LEVOTHYROXINE SODIUM 50 MCG: 50 TABLET ORAL at 05:24

## 2019-01-01 RX ADMIN — METHYLPREDNISOLONE SODIUM SUCCINATE 40 MG: 40 INJECTION, POWDER, FOR SOLUTION INTRAMUSCULAR; INTRAVENOUS at 10:37

## 2019-01-01 RX ADMIN — IPRATROPIUM BROMIDE AND ALBUTEROL SULFATE 3 ML: .5; 3 SOLUTION RESPIRATORY (INHALATION) at 15:59

## 2019-01-01 RX ADMIN — Medication 10 ML: at 15:10

## 2019-05-17 PROBLEM — R06.02 SHORTNESS OF BREATH: Status: ACTIVE | Noted: 2019-01-01

## 2019-05-17 NOTE — ED TRIAGE NOTES
Hx copd, increased SOB x1 week w/ dyspnea on exertion. 89% on RA with audible wheezes when EMS arrived. Administered duoneb PTA. Pt also reports constipation x5 days.

## 2019-05-17 NOTE — ED NOTES
TRANSFER - OUT REPORT: 
 
Verbal report given to Singh Patel RN(name) on Jordan Pompa  being transferred to [facility](unit) for routine progression of care Report consisted of patients Situation, Background, Assessment and  
Recommendations(SBAR). Information from the following report(s) SBAR, Kardex, ED Summary, [facility] and Cardiac Rhythm NSR PVC was reviewed with the receiving nurse. Lines:  
Peripheral IV 05/17/19 Right Antecubital (Active) Peripheral IV 05/17/19 Right Antecubital (Active) Site Assessment Clean, dry, & intact 5/17/2019 10:05 AM  
Phlebitis Assessment 0 5/17/2019 10:05 AM  
Infiltration Assessment 0 5/17/2019 10:05 AM  
Dressing Status Clean, dry, & intact 5/17/2019 10:05 AM  
Hub Color/Line Status Pink;Flushed 5/17/2019 10:05 AM  
  
 
Opportunity for questions and clarification was provided. Patient to be transported with: 
Transport

## 2019-05-17 NOTE — ED NOTES
Bedside shift change report given to Satish Santos (oncoming nurse) by Stephanie Yoo RN (offgoing nurse). Report included the following information SBAR, Kardex, ED Summary and MAR, NSR.  
 
 
MD took off nasal canula.  
If 02 goes below 87, notify MD.

## 2019-05-17 NOTE — PROGRESS NOTES
Pharmacy Clarification of the Prior to Admission Medication Regimen Retrospective to the Admission Medication Reconciliation The patient was interviewed regarding clarification of the prior to admission medication regimen and stated her daughter, [place], manages her medications. MHT called the patient's daughter, [place], [phone], who was able to verify the patient's PTA medication history. Information Obtained From: Patient's daughterJosh Recommendations/Findings: The following amendments were made to the patient's active medication list on file at [facility]:  
 
1) Additions:  
aspirin/salicylamide/caffeine (BC HEADACHE POWDER PO) 2) Removals: ASA 
azithromycin 
diazepam 
Ferrous fumarate/ascorbic acid 
combivent Potassium chloride 
prednisone 3) Changes: 
bumetanide (BUMEX) 1 mg tablet (Old regimen: 1 mg daily /New regimen: 0.25-0.5 mg daily PRN) This agent is prescribed daily, patient taking it PRN 
 
4) Pertinent Pharmacy Findings: 
Updated patient?s preferred outpatient pharmacy to: [facility] Patient's daughter, [place], lives with the patient and manages the patient's medications Potassium Chloride 10 mEq: Per patient's daughter, patient is prescribed 20 mEq daily, but has refused to take this agent for the past 2 months Levothyroxine 50 mcg: Per patient's daughter, patient is prescribed 50 mcg daily, but has refused to take this agent for the past 2 months PTA medication list was corrected to the following:  
 
Prior to Admission Medications Prescriptions Last Dose Informant Patient Reported? Taking? albuterol (PROVENTIL HFA, VENTOLIN HFA, PROAIR HFA) 90 mcg/actuation inhaler 5/17/2019 at 0100 Child No Yes Sig: Take 2 puffs by inhalation every four (4) hours as needed for Wheezing. aspirin/salicylamide/caffeine (BC HEADACHE POWDER PO) 5/16/2019 at Unknown time Child Yes Yes Sig: Take 1 Packet by mouth daily. bumetanide (BUMEX) 1 mg tablet 5/3/2019 at Unknown time Child Yes Yes Sig: Take 0.25-0.5 mg by mouth daily as needed (LLE swelling). Facility-Administered Medications: None Thank you, 
Aleja Campbell J Luis Medication History Pharmacy Technician

## 2019-05-17 NOTE — PROGRESS NOTES
TRANSFER - IN REPORT: 
 
Verbal report received from Kay(micky) on [address]  being received from ED(unit) for routine progression of care Report consisted of patients Situation, Background, Assessment and  
Recommendations(SBAR). Information from the following report(s) SBAR, Kardex and MAR was reviewed with the receiving nurse. Opportunity for questions and clarification was provided. Assessment completed upon patients arrival to unit and care assumed.

## 2019-05-17 NOTE — ED NOTES
Exp. Wheezing noted on auscultation in all fields. Pt reports coughing up clear phlegm x 2 or so days. No BM in 4 days, has been trying OTC dulcolax.

## 2019-05-17 NOTE — ACP (ADVANCE CARE PLANNING)
Advance Care Planning Note NAME: Anish Stephenson :  1935 MRN:  [id number] Date/Time:  2019 9:39 AM 
 
Active Diagnoses: 
Hospital Problems  Date Reviewed: 2013 Codes Class Noted POA Shortness of breath ICD-10-CM: R06.02 
ICD-9-CM: 786.05  2019 Unknown Hypokalemia ICD-10-CM: E87.6 ICD-9-CM: 276.8  2013 Unknown COPD Hypokalemia Hypothyroid Fluid retention These active diagnoses are of sufficient risk that focused discussion on advance care planning is indicated in order to allow the patient to thoughtfully consider personal goals of care, and if situations arise that prevent the ability to personally give input, to ensure appropriate representation of their personal desires for different levels and aggressiveness of care. Discussion:  
Code status addressed and wants to be a Partial Code. She does not want intubation. She wants chest compression, Shock and ACLS meds if needed. Patient wants central line and vasopressors if needed. Patient  would like to assign daughter [place]  as the surrogate decision maker. Persons present and participating in discussion: Jonas Calderón MD, Dtr [place]. Time Spent:  
Total time spent face-to-face in education and discussion:   16   minutes.   
 
 
 
Bronwyn Medina MD  
Hospitalist

## 2019-05-17 NOTE — PROGRESS NOTES
1107 
 
Patient currently in CT. Received report from Shun altamirano [facility]. Kay will give PO potassium and start new bag of IV potassium before patient gets to the floor.

## 2019-05-17 NOTE — ED PROVIDER NOTES
EMERGENCY DEPARTMENT HISTORY AND PHYSICAL EXAM  
     
 
Date: 5/17/2019 Patient Name: Janie Sifuentes History of Presenting Illness Chief Complaint Patient presents with  Shortness of Breath History Provided By:  Patient and family HPI: Janie Sifuentes is a 82 y.o. female, pmhx COPD, AAA, CVA, who presents ambulatory to the ED with c/o generally feeling poorly over the last 2 weeks. Noted to have increased shortness of breath at home earlier today. History of COPD but does not use supplemental oxygen at home. Noted to use her rescue inhaler 4-5 times today with some relief. Notes that her shortness of breath is worsened with ambulation. Also complains of diffuse abdominal pain and no bowel movement for 4 days. Noted to take Dulcolax for the last 3 days without any relief. Patient specifically denies any associated fevers, chills, nausea, vomiting, diarrhea, abd pain, CP, urinary sxs, or headache. PCP: Felice Woo MD 
 
Social Hx: + tobacco  denies EtOH , denies Illicit Drugs There are no other complaints, changes, or physical findings at this time. Allergies Allergen Reactions  Ciprofloxacin Other (comments) \"high feeling\"  Codeine Other (comments)  
  hallucinations  Cortisone Other (comments) Suicidal thoughts  Nsaids (Non-Steroidal Anti-Inflammatory Drug) Other (comments) Ulcer history  Other Medication Palpitations Iodine dye  Oxycodone Other (comments) Makes pt feel \"high\"  Penicillins Other (comments) Weak feeling  Vytorin 10-10 [Ezetimibe-Simvastatin] Other (comments) Abdominal swelling  Zantac [Ranitidine Hcl] Other (comments) Blisters Current Facility-Administered Medications Medication Dose Route Frequency Provider Last Rate Last Dose  methylPREDNISolone (PF) (Solu-MEDROL) injection 125 mg  125 mg IntraVENous NOW Sean Daily MD      
  albuterol-ipratropium (DUO-NEB) 2.5 MG-0.5 MG/3 ML  3 mL Nebulization NOW Ed Zamudio MD      
 potassium chloride 10 mEq in 100 ml IVPB  10 mEq IntraVENous Q1H Ed Zamudio MD      
 
Current Outpatient Medications Medication Sig Dispense Refill  albuterol (PROVENTIL HFA, VENTOLIN HFA, PROAIR HFA) 90 mcg/actuation inhaler Take 2 puffs by inhalation every four (4) hours as needed for Wheezing. 1 Inhaler 0  prednisone (STERAPRED DS) 10 mg dose pack Take per package directions 21 tablet 0  
 azithromycin (ZITHROMAX Z-HINA) 250 mg tablet Take per package instructions 1 Package 0  
 diazepam (VALIUM) 5 mg tablet Take 0.5 Tabs by mouth two (2) times daily as needed for Anxiety. 40 Tab 0  
 potassium chloride SA (MICRO-K) 10 mEq capsule Take 2 Caps by mouth daily. 60 Cap 1  
 bumetanide (BUMEX) 1 mg tablet Take 1 Tab by mouth every morning. 30 Tab 1  
 aspirin delayed-release 81 mg tablet Take 1 Tab by mouth daily. 30 Tab 1  FERROUS FUMARATE/ASCORBIC ACID (VITRON-C PO) Take 1 Tab by mouth daily.  IPRATROPIUM/ALBUTEROL SULFATE (COMBIVENT IN) Take 1-2 Puffs by inhalation four (4) times daily. 4 puffs daily Past History Past Medical History: 
Past Medical History:  
Diagnosis Date  Abdominal pain   
 nausea, bloating, frequent indigestion, ulcers  Arthritis  Cancer (HCC)   
 squamous cell - leg  Chronic pain  COPD  Dyspepsia and other specified disorders of function of stomach  Easy bruising  Hypercholesterolemia  Osteoporosis  Other ill-defined conditions(799.89)   
 blood transfusion with miscarriage  Other ill-defined conditions(799.89)   
 gout  Other ill-defined conditions(799.89) 1/13  
 hematuria, being evaluated by Dr Amber Garcia  Thyroid disease Past Surgical History: 
Past Surgical History:  
Procedure Laterality Date  ABDOMEN SURGERY PROC UNLISTED    
 colon resection  HX APPENDECTOMY  HX BREAST BIOPSY bilateral  
 HX CHOLECYSTECTOMY  11/26/12  
 - LAPAROSOCPIC CHOLECYSTECTOMY WITH GRAMS POSSIBLE OPEN  
 HX DILATION AND CURETTAGE    
 HX GI    
 colon surgery [address]  
 hysterectomy-right ovary removed  HX HEART CATHETERIZATION    
 HX HEENT    
 tonsillectomy  HX ORTHOPAEDIC    
 carpal tunnel-left  NE EGD BALLOON DILATION ESOPHAGUS <30 MM DIAM  1/21/2013  NE EGD TRANSORAL BIOPSY SINGLE/MULTIPLE  1/21/2013  NE ERCP W/SPHINCTEROTOMY/PAPILLOTOMY  5/10/2013 Family History: 
Family History Problem Relation Age of Onset  Heart Disease Mother  Cancer Father   
     lung  Diabetes Father  Diabetes Son   
 
 
Social History: 
Social History Tobacco Use  Smoking status: Current Every Day Smoker Packs/day: 0.50 Years: 65.00 Pack years: 32.50  Smokeless tobacco: Never Used Substance Use Topics  Alcohol use: No  
 Drug use: No  
  Types: Prescription, OTC Allergies: Allergies Allergen Reactions  Ciprofloxacin Other (comments) \"high feeling\"  Codeine Other (comments)  
  hallucinations  Cortisone Other (comments) Suicidal thoughts  Nsaids (Non-Steroidal Anti-Inflammatory Drug) Other (comments) Ulcer history  Other Medication Palpitations Iodine dye  Oxycodone Other (comments) Makes pt feel \"high\"  Penicillins Other (comments) Weak feeling  Vytorin 10-10 [Ezetimibe-Simvastatin] Other (comments) Abdominal swelling  Zantac [Ranitidine Hcl] Other (comments) Blisters Review of Systems Review of Systems Constitutional: Negative. Negative for fever. Eyes: Negative. Respiratory: Positive for shortness of breath. Cardiovascular: Negative for chest pain. Gastrointestinal: Positive for abdominal pain and constipation. Negative for nausea and vomiting. Endocrine: Negative. Genitourinary: Negative.   Negative for difficulty urinating, dysuria and hematuria. Musculoskeletal: Negative. Skin: Negative. Neurological: Negative. Psychiatric/Behavioral: Negative for suicidal ideas. All other systems reviewed and are negative. Physical Exam  
Physical Exam  
Constitutional: She is oriented to person, place, and time. She appears well-developed and well-nourished. No distress. overweight HENT:  
Head: Normocephalic and atraumatic. Nose: Nose normal.  
Eyes: Conjunctivae and EOM are normal. No scleral icterus. Neck: Normal range of motion. No tracheal deviation present. Cardiovascular: Normal rate, regular rhythm, normal heart sounds and intact distal pulses. Exam reveals no friction rub. No murmur heard. Pulmonary/Chest: No stridor. Tachypnea noted. She is in respiratory distress. She has wheezes. She has no rales. Abdominal: Soft. Bowel sounds are normal. She exhibits no distension. There is no tenderness. There is no rebound. Musculoskeletal: Normal range of motion. She exhibits no tenderness. Neurological: She is alert and oriented to person, place, and time. No cranial nerve deficit. Skin: Skin is warm and dry. No rash noted. She is not diaphoretic. Psychiatric: She has a normal mood and affect. Her speech is normal and behavior is normal. Judgment and thought content normal. Cognition and memory are normal.  
Nursing note and vitals reviewed. Diagnostic Study Results Labs - Recent Results (from the past 12 hour(s)) SAMPLES BEING HELD Collection Time: 05/17/19  3:44 AM  
Result Value Ref Range SAMPLES BEING HELD BL   
 COMMENT Add-on orders for these samples will be processed based on acceptable specimen integrity and analyte stability, which may vary by analyte. CBC WITH AUTOMATED DIFF Collection Time: 05/17/19  3:44 AM  
Result Value Ref Range WBC 8.5 3.6 - 11.0 K/uL  
 RBC 4.37 3.80 - 5.20 M/uL  
 HGB 13.3 11.5 - 16.0 g/dL HCT 40.2 35.0 - 47.0 %  MCV 92.0 80.0 - 99.0 FL  
 MCH 30.4 26.0 - 34.0 PG  
 MCHC 33.1 30.0 - 36.5 g/dL  
 RDW 15.9 (H) 11.5 - 14.5 % PLATELET 688 937 - 660 K/uL MPV 9.9 8.9 - 12.9 FL  
 NRBC 0.0 0  WBC ABSOLUTE NRBC 0.00 0.00 - 0.01 K/uL NEUTROPHILS 65 32 - 75 % LYMPHOCYTES 13 12 - 49 % MONOCYTES 14 (H) 5 - 13 % EOSINOPHILS 5 0 - 7 % BASOPHILS 2 (H) 0 - 1 % IMMATURE GRANULOCYTES 1 (H) 0.0 - 0.5 % ABS. NEUTROPHILS 5.6 1.8 - 8.0 K/UL  
 ABS. LYMPHOCYTES 1.1 0.8 - 3.5 K/UL  
 ABS. MONOCYTES 1.1 (H) 0.0 - 1.0 K/UL  
 ABS. EOSINOPHILS 0.4 0.0 - 0.4 K/UL  
 ABS. BASOPHILS 0.1 0.0 - 0.1 K/UL  
 ABS. IMM. GRANS. 0.1 (H) 0.00 - 0.04 K/UL  
 DF AUTOMATED METABOLIC PANEL, COMPREHENSIVE Collection Time: 05/17/19  3:44 AM  
Result Value Ref Range Sodium 142 136 - 145 mmol/L Potassium 2.3 (LL) 3.5 - 5.1 mmol/L Chloride 104 97 - 108 mmol/L  
 CO2 31 21 - 32 mmol/L Anion gap 7 5 - 15 mmol/L Glucose 132 (H) 65 - 100 mg/dL BUN 12 6 - 20 MG/DL Creatinine 0.77 0.55 - 1.02 MG/DL  
 BUN/Creatinine ratio 16 12 - 20 GFR est AA >60 >60 ml/min/1.73m2 GFR est non-AA >60 >60 ml/min/1.73m2 Calcium 8.2 (L) 8.5 - 10.1 MG/DL Bilirubin, total 2.7 (H) 0.2 - 1.0 MG/DL  
 ALT (SGPT) 12 12 - 78 U/L  
 AST (SGOT) 38 (H) 15 - 37 U/L Alk. phosphatase 131 (H) 45 - 117 U/L Protein, total 6.8 6.4 - 8.2 g/dL Albumin 2.9 (L) 3.5 - 5.0 g/dL Globulin 3.9 2.0 - 4.0 g/dL A-G Ratio 0.7 (L) 1.1 - 2.2 NT-PRO BNP Collection Time: 05/17/19  3:44 AM  
Result Value Ref Range NT pro- <450 PG/ML  
TROPONIN I Collection Time: 05/17/19  3:44 AM  
Result Value Ref Range Troponin-I, Qt. 0.06 (H) <0.05 ng/mL TSH 3RD GENERATION Collection Time: 05/17/19  3:44 AM  
Result Value Ref Range TSH 33.80 (H) 0.36 - 3.74 uIU/mL  
TROPONIN I Collection Time: 05/17/19  6:26 AM  
Result Value Ref Range Troponin-I, Qt. 0.07 (H) <0.05 ng/mL Radiologic Studies -  
XR CHEST PORT Final Result CT Results  (Last 48 hours) None CXR Results  (Last 48 hours) 05/17/19 0615  XR CHEST PORT Final result Impression:  IMPRESSION: Poor visualization of the left lung base. Airspace disease/effusion  
cannot be excluded. Narrative:  EXAM: Portable chest  
   
INDICATION: Shortness of breath COMPARISON: 2014 FINDINGS: A portable AP radiograph of the chest was obtained at hours. The  
patient is on a cardiac monitor. There is poor visualization of the left lung  
base. The cardiac and mediastinal contours and pulmonary vascularity are normal.  
 The bones and soft tissues are grossly within normal limits. Medical Decision Making I am the first provider for this patient. I reviewed the vital signs, available nursing notes, past medical history, past surgical history, family history and social history. Vital Signs-Reviewed the patient's vital signs. Patient Vitals for the past 12 hrs: 
 Temp Pulse Resp BP SpO2  
05/17/19 0723     (!) 86 % 05/17/19 0717     (!) 88 % 05/17/19 0627  85  118/79 96 % 05/17/19 0430  88 22 97/51 94 % 05/17/19 0400  94 20 119/44 94 % 05/17/19 0355     92 % 05/17/19 0355     (!) 89 % 05/17/19 0330  (!) 101 22 131/65 (!) 89 % 05/17/19 0328     97 % 05/17/19 0316 97.5 °F (36.4 °C) (!) 106 20 153/86 97 % Pulse Oximetry Analysis - 89% on RA Cardiac Monitor:  
Rate: 106 bpm 
Rhythm: Sinus tach Records Reviewed: Nursing Notes, Old Medical Records, Previous electrocardiograms, Previous Radiology Studies and Previous Laboratory Studies Provider Notes (Medical Decision Making): DDX: 
COPD exacerbation, pneumonia, URI, ACS, arrhythmia Plan: 
EKG, chest x-ray, labs, nebs, steroids Impression: 
Copd exacerbation ED Course:  
Initial assessment performed.  The patients presenting problems have been discussed, and they are in agreement with the care plan formulated and outlined with them. I have encouraged them to ask questions as they arise throughout their visit. I reviewed our electronic medical record system for any past medical records that were available that may contribute to the patients current condition, the nursing notes and and vital signs from today's visit Nursing notes will be reviewed as they become available in realtime while the pt has been in the ED. Mendel Branch, MD 
 
 
TOBACCO COUNSELING: 
During evaluation pt reported that they are a current tobacco user. I have spent 3 minutes discussing the medical risks of prolonged smoking habits and advised the patient of the benefits of the cessation of smoking, providing specific suggestions on how to quit. Pt has been counseled and encouraged to quit as soon as possible in order to decrease further risks to their health. Pt has conveyed their understanding of the risks involved should they continue to use tobacco products. Mendel Branch, MD 
 
I personally reviewed pt's imaging. Official read by radiology noted above. Mendel Branch, MD 
 
  
 
PROGRESS NOTE: 
5872 Pt noted to be feeling much improved after neb treatment. Will do trial off oxygen as she does not have home oxygen and determine if she becomes hypoxic. Patient reportedly mostly wheelchair-bound and does not ambulate at baseline per family. We will not perform ambulation trial.  Discussed with patient need to take home potassium and thyroid medications as previously prescribed. Patient notes she will follow-up Dr. Hi Vega who manages both. Mendel Branch, MD 
 
 
PROGRESS NOTE 
7:12 AM 
Patient noted to desat to 86% while on room air. Will plan for admission. Patient and family aware of plan and in agreement. CONSULT NOTE:  
7:32 AM 
Mendel Branch, MD spoke with Dr. Heydi Denson, Specialty: [facility] Dr. Heydi Denson due to hypoxia/copd exacerbation.   Discussed pt's HPI and available diagnostic results thus far. Expressed concerns for needed admission. Consultant will evaluate for admission. Jones Ruth MD 
 
 
 
Critical Care Time:  
 
none Diagnosis Clinical Impression: 1. Acute exacerbation of chronic obstructive pulmonary disease (COPD) ([address] 2. Hypokalemia 3. SOB (shortness of breath) 4. Hypothyroidism, unspecified type 5. Hypoxia PLAN:  
1. ADMISSION NOTE: 
7:32 AM 
Patient is being admitted to the hospital by Dr. Oswald Dia. The results of their tests and reasons for their admission have been discussed with them and/or available family. They convey agreement and understanding for the need to be admitted and for their admission diagnosis. Jones Ruth MD 
 
 
 
 
Please note that this dictation was completed with "[employer] the computer voice recognition software. Quite often unanticipated grammatical, syntax, homophones, and other interpretive errors are inadvertently transcribed by the computer software. Please disregard these errors. Please excuse any errors that have escaped final proofreading This note will not be viewable in [address]

## 2019-05-17 NOTE — H&P
Hospitalist Admission NoteNAME: Paty Chilel :  1935 MRN:  [id number] Date/Time:  2019 9:26 AM 
 
Patient PCP: Masha Hernandez MD 
________________________________________________________________________ My assessment of this patient's clinical condition and my plan of care is as follows. Assessment / Plan: 
Acute hypoxic respiratory failure due to acute COPD exacerbation 
-Chest x-ray is not clear and mentioned that airspace disease effusion cannot be ruled out. 
-We will get a CT angiogram of chest to rule out other causes and also to look at the left lung base 
-Start empiric DuoNeb, azithromycin and Solu-Medrol 
-Continue oxygen by nasal cannula to maintain saturations around 92% 
-Start Mucinex Severe hypokalemia 
-Patient has been replaced with KCl 
-She will receive about 100 mEq of KCl in combination doses with both IV and oral 
-Resume her home potassium supplementation which she has not been taking since 2 months 
-Check BMP in a.m. 
-Check magnesium level due to severe hypokalemia Hypothyroidism Per daughter, patient has not been taking her levothyroxine since last 2 months -TSH currently is 33.8 
-Resume home levothyroxine and check thyroid function testing in 6 weeks History of fluid retention per patient 
-No previous diagnosis of congestive heart failure per patient 
-Resume home Bumex Constipation 
-Last BM was 4 days ago. No abdominal pain 
-will give carmelo colace and lactulose x 1. Total hyperbilirubinemia 
-lft's are normal. Will check direct bilirubin and repeat bmp in am 
-consider further testing based on above levels. -check us liver and biliary tree Mild troponin elevation likely demand ischemia 
-No ekg in ED. 
-will check 12 lead EKG. -Check 2 more sets of troponins. She has no chest pain. Code Status: Partial, No Intubation, okay for chest compressions, shock and meds Surrogate Decision Maker: Bridgett Damon DVT Prophylaxis: heparin GI Prophylaxis: not indicated Baseline: From home wheel chair bound at home mostly Subjective: CHIEF COMPLAINT: Sob HISTORY OF PRESENT ILLNESS:    
This is a 73-year-old female with past medical history of COPD, hypertension, hypothyroidism, noncompliance to medications is coming to the hospital with chief complaints of shortness of breath since the last 1 week.  Patient is accompanied by her daughter who is at the bedside.  According to them, patient stopped taking all her medications since the last 2 months due to heartburn.  Patient reports having shortness of breath which is worse with minimal exertion, also has cough and whitish phlegm.  She does not report any chest pain.  She also reports generalized swelling of her legs but that currently is better.  Does not report any palpitations or syncopal episodes.  Denies focal weakness, tingling or numbness. On arrival to the hospital her vital signs are within normal limits but she desaturated to 89% on room air.  On lab work CBC was within normal limits.  BMP showed a potassium of 2.3.  First troponin was 0.07.  She had a 1 view chest x-ray which showed poor visualization of the left lung base. We were asked to admit for work up and evaluation of the above problems. Past Medical History:  
Diagnosis Date  Abdominal pain   
 nausea, bloating, frequent indigestion, ulcers  Arthritis  Cancer (HCC)   
 squamous cell - leg  Chronic pain  COPD  Dyspepsia and other specified disorders of function of stomach  Easy bruising  Hypercholesterolemia  Osteoporosis  Other ill-defined conditions(799.89)   
 blood transfusion with miscarriage  Other ill-defined conditions(799.89)   
 gout  Other ill-defined conditions(799.89) 1/13  
 hematuria, being evaluated by Dr Pelayo Silence  Thyroid disease Past Surgical History:  
Procedure Laterality Date  ABDOMEN SURGERY PROC UNLISTED    
 colon resection  HX APPENDECTOMY  HX BREAST BIOPSY    
 bilateral  
 HX CHOLECYSTECTOMY  11/26/12  
 - LAPAROSOCPIC CHOLECYSTECTOMY WITH GRAMS POSSIBLE OPEN  
 HX DILATION AND CURETTAGE    
 HX GI    
 colon surgery [address] 19.  
 hysterectomy-right ovary removed  HX HEART CATHETERIZATION    
 HX HEENT    
 tonsillectomy  HX ORTHOPAEDIC    
 carpal tunnel-left  [place] EGD BALLOON DILATION ESOPHAGUS <30 MM DIAM  1/21/2013  [place] EGD TRANSORAL BIOPSY SINGLE/MULTIPLE  1/21/2013  [place] ERCP W/SPHINCTEROTOMY/PAPILLOTOMY  5/10/2013 Social History Tobacco Use  Smoking status: Current Every Day Smoker Packs/day: 0.50 Years: 65.00 Pack years: 32.50  Smokeless tobacco: Never Used Substance Use Topics  Alcohol use: No  
  
 
Family History Problem Relation Age of Onset  Heart Disease Mother  Cancer Father   
     lung  Diabetes Father  Diabetes Son Allergies Allergen Reactions  Ciprofloxacin Other (comments) \"high feeling\"  Codeine Other (comments)  
  hallucinations  Cortisone Other (comments) Suicidal thoughts  Nsaids (Non-Steroidal Anti-Inflammatory Drug) Other (comments) Ulcer history  Other Medication Palpitations Iodine dye  Oxycodone Other (comments) Makes pt feel \"high\"  Penicillins Other (comments) Weak feeling  Vytorin 10-10 [Ezetimibe-Simvastatin] Other (comments) Abdominal swelling  Zantac [Ranitidine Hcl] Other (comments) Blisters Prior to Admission medications Medication Sig Start Date End Date Taking? Authorizing Provider  
albuterol (PROVENTIL HFA, VENTOLIN HFA, PROAIR HFA) 90 mcg/actuation inhaler Take 2 puffs by inhalation every four (4) hours as needed for Wheezing.  10/2/14   Paola Curry MD  
prednisone (STERAPRED DS) 10 mg dose pack Take per package directions 10/2/14   Kathleen Gonzalez MD  
azithromycin (ZITHROMAX Z-HINA) 250 mg tablet Take per package instructions 10/2/14   Kathleen Gonzalez MD  
diazepam (VALIUM) 5 mg tablet Take 0.5 Tabs by mouth two (2) times daily as needed for Anxiety. 5/11/13   Quentin Haines MD  
potassium chloride SA (MICRO-K) 10 mEq capsule Take 2 Caps by mouth daily. 5/11/13   Quentin Haines MD  
bumetanide (BUMEX) 1 mg tablet Take 1 Tab by mouth every morning. 5/11/13   Franca Betts MD  
aspirin delayed-release 81 mg tablet Take 1 Tab by mouth daily. 5/11/13   Quentin Haines MD  
FERROUS FUMARATE/ASCORBIC ACID (VITRON-C PO) Take 1 Tab by mouth daily. Provider, Historical  
IPRATROPIUM/ALBUTEROL SULFATE (COMBIVENT IN) Take 1-2 Puffs by inhalation four (4) times daily. 4 puffs daily    Provider, Historical  
 
 
REVIEW OF SYSTEMS:    
I am not able to complete the review of systems because: The patient is intubated and sedated The patient has altered mental status due to his acute medical problems The patient has baseline aphasia from prior stroke(s) The patient has baseline dementia and is not reliable historian The patient is in acute medical distress and unable to provide information Total of 12 systems reviewed as follows:   
   POSITIVE= underlined text  Negative = text not underlined General:  fever, chills, sweats, generalized weakness, weight loss/gain,  
   loss of appetite Eyes:    blurred vision, eye pain, loss of vision, double vision ENT:    rhinorrhea, pharyngitis Respiratory:   cough, sputum production, SOB, MADERA, wheezing, pleuritic pain  
Cardiology:   chest pain, palpitations, orthopnea, PND, edema, syncope Gastrointestinal:  abdominal pain , N/V, diarrhea, dysphagia, constipation, bleeding Genitourinary:  frequency, urgency, dysuria, hematuria, incontinence Muskuloskeletal :  arthralgia, myalgia, back pain Hematology:  easy bruising, nose or gum bleeding, lymphadenopathy Dermatological: rash, ulceration, pruritis, color change / jaundice Endocrine:   hot flashes or polydipsia Neurological:  headache, dizziness, confusion, focal weakness, paresthesia, Speech difficulties, memory loss, gait difficulty Psychological: Feelings of anxiety, depression, agitation Objective: VITALS:   
Visit Vitals /63 Pulse 85 Temp 97.5 °F (36.4 °C) Resp 22 Ht 4' 7\" (1.397 m) SpO2 92% BMI 38.74 kg/m² PHYSICAL EXAM: 
 
General:    Alert, cooperative, no distress, appears stated age. HEENT: Atraumatic, anicteric sclerae, pink conjunctivae No oral ulcers, mucosa moist 
Neck:  Supple, symmetrical,  thyroid: non tender Lungs:   Good air entry, mild wheezing +, no crackles Chest wall:  No tenderness  No Accessory muscle use. Heart:   Regular  rhythm,  No  murmur   No edema Abdomen:   Soft, non-tender. Not distended. Bowel sounds normal 
Extremities: No cyanosis. No clubbing,   
  Skin turgor normal, Capillary refill normal, Radial dial pulse 2+ Skin:     Not pale. Not Jaundiced  No rashes Psych:  Not anxious or agitated. Neurologic: EOMs intact. No facial asymmetry. No aphasia or slurred speech. Symmetrical strength, Sensation grossly intact. Alert and oriented X 4.  
 
_______________________________________________________________________ Care Plan discussed with: 
  Comments Patient y Family RN y   
Care Manager Consultant:     
_______________________________________________________________________ Expected  Disposition:  
Home with Family y HH/PT/OT/RN   
SNF/LTC   
[facility]   
________________________________________________________________________ TOTAL TIME:  60 Minutes Critical Care Provided     Minutes non procedure based   Comments  
 y Reviewed previous records  
>50% of visit spent in counseling and coordination of care y Discussion with patient and/or family and questions answered 
  
 
________________________________________________________________________ Signed: Domonique Rodriguez MD 
 
Procedures: see electronic medical records for all procedures/Xrays and details which were not copied into this note but were reviewed prior to creation of Plan. LAB DATA REVIEWED:   
Recent Results (from the past 24 hour(s)) SAMPLES BEING HELD Collection Time: 05/17/19  3:44 AM  
Result Value Ref Range SAMPLES BEING HELD BL   
 COMMENT Add-on orders for these samples will be processed based on acceptable specimen integrity and analyte stability, which may vary by analyte. CBC WITH AUTOMATED DIFF Collection Time: 05/17/19  3:44 AM  
Result Value Ref Range WBC 8.5 3.6 - 11.0 K/uL  
 RBC 4.37 3.80 - 5.20 M/uL  
 HGB 13.3 11.5 - 16.0 g/dL HCT 40.2 35.0 - 47.0 % MCV 92.0 80.0 - 99.0 FL  
 MCH 30.4 26.0 - 34.0 PG  
 MCHC 33.1 30.0 - 36.5 g/dL  
 RDW 15.9 (H) 11.5 - 14.5 % PLATELET 056 979 - 380 K/uL MPV 9.9 8.9 - 12.9 FL  
 NRBC 0.0 0  WBC ABSOLUTE NRBC 0.00 0.00 - 0.01 K/uL NEUTROPHILS 65 32 - 75 % LYMPHOCYTES 13 12 - 49 % MONOCYTES 14 (H) 5 - 13 % EOSINOPHILS 5 0 - 7 % BASOPHILS 2 (H) 0 - 1 % IMMATURE GRANULOCYTES 1 (H) 0.0 - 0.5 % ABS. NEUTROPHILS 5.6 1.8 - 8.0 K/UL  
 ABS. LYMPHOCYTES 1.1 0.8 - 3.5 K/UL  
 ABS. MONOCYTES 1.1 (H) 0.0 - 1.0 K/UL  
 ABS. EOSINOPHILS 0.4 0.0 - 0.4 K/UL  
 ABS. BASOPHILS 0.1 0.0 - 0.1 K/UL  
 ABS. IMM. GRANS. 0.1 (H) 0.00 - 0.04 K/UL  
 DF AUTOMATED METABOLIC PANEL, COMPREHENSIVE Collection Time: 05/17/19  3:44 AM  
Result Value Ref Range Sodium 142 136 - 145 mmol/L Potassium 2.3 (LL) 3.5 - 5.1 mmol/L Chloride 104 97 - 108 mmol/L  
 CO2 31 21 - 32 mmol/L Anion gap 7 5 - 15 mmol/L Glucose 132 (H) 65 - 100 mg/dL BUN 12 6 - 20 MG/DL Creatinine 0.77 0.55 - 1.02 MG/DL  
 BUN/Creatinine ratio 16 12 - 20 GFR est AA >60 >60 ml/min/1.73m2 GFR est non-AA >60 >60 ml/min/1.73m2 Calcium 8.2 (L) 8.5 - 10.1 MG/DL Bilirubin, total 2.7 (H) 0.2 - 1.0 MG/DL  
 ALT (SGPT) 12 12 - 78 U/L  
 AST (SGOT) 38 (H) 15 - 37 U/L Alk. phosphatase 131 (H) 45 - 117 U/L Protein, total 6.8 6.4 - 8.2 g/dL Albumin 2.9 (L) 3.5 - 5.0 g/dL Globulin 3.9 2.0 - 4.0 g/dL A-G Ratio 0.7 (L) 1.1 - 2.2 NT-PRO BNP Collection Time: 05/17/19  3:44 AM  
Result Value Ref Range NT pro- <450 PG/ML  
TROPONIN I Collection Time: 05/17/19  3:44 AM  
Result Value Ref Range Troponin-I, Qt. 0.06 (H) <0.05 ng/mL TSH 3RD GENERATION Collection Time: 05/17/19  3:44 AM  
Result Value Ref Range TSH 33.80 (H) 0.36 - 3.74 uIU/mL  
TROPONIN I Collection Time: 05/17/19  6:26 AM  
Result Value Ref Range Troponin-I, Qt. 0.07 (H) <0.05 ng/mL

## 2019-05-17 NOTE — ED NOTES
Called to see pt for assistance using the bathroom. Pts daughter reports she is a stand/pivot at home. Assisted pt to turn s/s, perineal care done, placed on clean linens and positioned up in bed for comfort. Skin clean and intact. Purewic cath placed, warm blanket provided.

## 2019-05-17 NOTE — PROGRESS NOTES
Physical Therapy Note: 
Consult received and chart reviewed. Approached nsg and family regarding evaluation who both asked to deferr d/t patient \"just falling asleep\". Per daughter, mahi transfers x3-4 steps with assist for all mobility at home d/t balance. Daughter has been involved in patient's care for some time but asked therapy to return and possibly come to home to \"get her to do more. \" 
Will follow up for eval. 
Elena Black, PT, DPT

## 2019-05-18 NOTE — PROGRESS NOTES
Hospitalist Progress Note NAME: Rafael Hogan :  1935 MRN:  [id number] Assessment / Plan: 
Acute hypoxic respiratory failure Suspected acute on chronic COPD exacerbation, r/o CHF 
-Chest x-ray is not clear and mentioned that airspace disease effusion cannot be ruled out. -CTA chest, no PE, L pleural effusion 
-cont empiric DuoNeb, azithromycin and Solu-Medrol 
-Continue oxygen by nasal cannula to maintain saturations around 92% 
-cont Mucinex 
-wean o2, does not have at home 
-flutter valve, sputum cx if able 
  
Severe hypokalemia 
-Patient has been replaced with KCl, K better today from 2.3 to 3.4 
-cont po supp 
-on bumex now 
-follow maranda 
-Check magnesium level Hypothyroidism, uncontrolled Per daughter, patient has not been taking her levothyroxine since last 2 months -TSH currently is 33.8 
-Resumed home levothyroxine and check thyroid function testing in 4 weeks History of fluid retention per patient 
-No previous diagnosis of congestive heart failure per patient -now on Bumex 
  
Constipation 
-Last BM was 4 days ago. No abdominal pain 
- give carmelo colace and lactulose x 1. 
  
Total hyperbilirubinemia 
-lft's otherwise are normal.  
-passive liver congestion?  
-bili 2.7 to 1.9 today, mostly inderect 
-normal us, absent GB 
-follow Mild troponin elevation, ? demand ischemia ? CHF History of coronary artery disease cath in  
-Patient reports recent swelling and weight gain (but noncompliant with meds) -We will check an echocardiogram 
-will ask for cardiology consultation 
-Patient's chest CT done on admission did not show PE but revealed an ascending thoracic aortic aneurysm of 5.9 cm in diameter and a descending thoracic aortic aneurysm of 4.5 cm, no comparisons in the left pleural effusion with lower lobe atelectasis 
-patient was aware of the aneurysm but has never been referred to vascular surgery, perhaps because she is not a candidate for surgery given her age and comorbidities and risk 
-We will ask for formal vascular evaluation as it is possible that this may also be affecting her heart and respiratory symptoms 
-check bnp and change bumex to IV for now Noncompliance with medications 
-Daughter reports she stopped her meds about 2 months ago Acute metabolic encephalopathy 
-this may have been due to hypoxemia on admit, improved we will follow closely 
-Consider imaging if needed 
-She is also significantly hypothyroid due to noncompliance with her medications which may be affecting her mentation as well 
-neurochecks Code Status: Partial, No Intubation, okay for chest compressions, shock and meds Surrogate Decision Maker: Bridgett Hagan 
  
DVT Prophylaxis: heparin GI Prophylaxis: not indicated 
  
Baseline: From home recliner bound at home, daughter 24hr 40 or above Morbid obesity / Body mass index is 44.9 kg/m². Recommended Disposition: Home w/Family and HH PT, OT, RN Subjective: Chief Complaint / Reason for Physician Visit 
sob Patient reports she came to the hospital because she did not feel right to me she denied shortness of breath or chest pain. According to the daughter who I spoke to on the phone reported that she seemed to have some trouble breathing and was feeling very anxious and could not quite say what was wrong. Patient does not use oxygen at home but has a history of COPD and is followed by Dr. Fabiola Owusu as an outpatient. She has had a cough but she says with clear sputum. She denies any fevers or chills. She has had some swelling in her legs. I confirmed her history with her daughter who cares for her 24/7. She is essentially chair bound and resides in a recliner due to poor balance. And her ADLs are done by her daughter.   The patient herself refused to take her medications about 2 months ago stopped taking her fluid pill, her potassium her thyroid medicine because she thought it was affecting her stomach. The daughter did notice in the last couple days that her mental status has been off and on but she does not have a diagnosis of dementia. The daughter confirms that the patient has been told she has and aneurysm in her aorta that was diagnosed by Dr. Linda Dao but has never been referred to a vascular surgeon. She has a history of coronary artery disease but no interventions or cardiac follow-up since 2013 and no reported history of congestive heart failure. The patient denies any chest pain. She denies any current shortness of breath but is on 3 L of oxygen. She denies any nausea vomiting or abdominal pain. She denies any urinary symptoms. Patient was evaluated at 9:30 AM 
 
 
 
Discussed with RN events overnight. Review of Systems: 
Symptom Y/N Comments  Symptom Y/N Comments Fever/Chills    Chest Pain n   
Poor Appetite n   Edema Cough y   Abdominal Pain n   
Sputum y clear  Joint Pain SOB/MAEDRA n   Pruritis/Rash Nausea/vomit n   Tolerating PT/OT Diarrhea    Tolerating Diet y Constipation    Other Could NOT obtain due to:   
 
Objective: VITALS:  
Last 24hrs VS reviewed since prior progress note. Most recent are: 
Patient Vitals for the past 24 hrs: 
 Temp Pulse Resp BP SpO2  
05/18/19 1127 99 °F (37.2 °C) 92 20 95/42 91 % 05/18/19 1101     94 % 05/18/19 0836 98.2 °F (36.8 °C) 96 23 114/52 92 % 05/18/19 0713     93 % 05/18/19 0235 98.7 °F (37.1 °C) 93 22 135/59 90 % 05/17/19 2257 98.8 °F (37.1 °C) 91 18 119/77 90 % 05/17/19 1920 98.4 °F (36.9 °C) 91 18 135/54 96 % 05/17/19 1600     96 % 05/17/19 1546 98.4 °F (36.9 °C) 88 20 151/49 97 % 05/17/19 1228 98.1 °F (36.7 °C) 92 20 105/42 97 % Intake/Output Summary (Last 24 hours) at 5/18/2019 1138 Last data filed at 5/18/2019 2166 Gross per 24 hour Intake 50 ml  
 Output 900 ml Net -850 ml PHYSICAL EXAM: 
Patient is awake and alert on nasal cannula seems to have some dyspnea at rest but no distress. She is oriented to self, hospital, had a little trouble with the month and the year knows the president. Conjunctiva slightly pale mucous membranes are dry cardiovascular regular rate systolic murmur no rubs or gallops. Lungs diminished throughout poor air movement with expiratory wheezes and rhonchi. Abdomen is obese bowel sounds present soft nontender. Extremities currently no clubbing cyanosis or edema. Neurologically she seems to have symmetric strength in her hands and was able to move her feet and lift both her legs up off the bed. Reviewed most current lab test results and cultures  YES Reviewed most current radiology test results   YES Review and summation of old records today    NO Reviewed patient's current orders and MAR    YES 
PMH/ reviewed - no change compared to H&P 
________________________________________________________________________ Care Plan discussed with: 
  Comments Patient y Family  y daughter RN y   
Care Manager Consultant Multidiciplinary team rounds were held today with , nursing, pharmacist and clinical coordinator. Patient's plan of care was discussed; medications were reviewed and discharge planning was addressed. ________________________________________________________________________ Total NON critical care TIME:    Minutes Total CRITICAL CARE TIME Spent:   Minutes non procedure based Comments >50% of visit spent in counseling and coordination of care    
________________________________________________________________________ Lynn Smith MD  
 
Procedures: see electronic medical records for all procedures/Xrays and details which were not copied into this note but were reviewed prior to creation of Plan.    
 
LABS: 
 I reviewed today's most current labs and imaging studies. Pertinent labs include: 
Recent Labs 05/18/19 
0304 05/17/19 
4071 WBC 11.0 8.5 HGB 12.1 13.3 HCT 38.0 40.2  218 Recent Labs 05/18/19 
0304 05/17/19 
9177  142  
K 3.4* 2.3*  
* 104 CO2 29 31 * 132* BUN 15 12 CREA 0.93 0.77 CA 7.8* 8.2* ALB 2.6* 2.9* TBILI 1.9* 2.7* SGOT 35 38* ALT 12 12 Signed: Jordyn Salmon MD

## 2019-05-18 NOTE — PROGRESS NOTES
Pt adamantly refusing PT Evaluation this afternoon. Daughter reports that pt  mobility at home is transferring from [facility] to [facility] with assist, stand pivot. Discussed with pt that she will need to be able to perform stand pivot in order to go home. Continues to refuse. Will follow up Monday for PT Evaluation.  
 
Miranda Fischer, PT

## 2019-05-18 NOTE — PROGRESS NOTES
ADULT PROTOCOL: JET AEROSOL  REASSESSMENT Patient  El Harper     82 y.o.   female     5/18/2019  9:24 AM 
 
Breath Sounds Pre Procedure: Right Breath Sounds: Expiratory wheezing Left Breath Sounds: Expiratory wheezing Breath Sounds Post Procedure: Right Breath Sounds: Expiratory wheezing Left Breath Sounds: Expiratory wheezing Breathing pattern: Pre procedure Breathing Pattern: Dyspnea at rest, Labored Post procedure Breathing Pattern: Dyspnea at rest 
 
Heart Rate: Pre procedure Pulse: 92 
         Post procedure Pulse: 93 Resp Rate: Pre procedure Respirations: 20 
         Post procedure Respirations: 20 
 
     
 
Cough: Pre procedure Cough: Moist, Non-productive Post procedure Cough: Moist, Non-productive Oxygen: O2 Device: Nasal cannula   Flow rate (L/min) 3 lpm 
   Changed: NO SpO2: Pre procedure SpO2: 93 %   with oxygen Post procedure SpO2: 93 %  with oxygen Nebulizer Therapy: Current medications Aerosolized Medications: DuoNeb Changed: NO 
 
 
 
Problem List:  
Patient Active Problem List  
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  
 Confusion R41.0  Shortness of breath R06.02 Respiratory Therapist: Lauren Galo RT

## 2019-05-18 NOTE — PROGRESS NOTES
Problem: Falls - Risk of 
Goal: *Absence of Falls Description Document Milan Castro Fall Risk and appropriate interventions in the flowsheet. Outcome: Progressing Towards Goal 
  
Problem: Patient Education: Go to Patient Education Activity Goal: Patient/Family Education Outcome: Progressing Towards Goal 
  
Problem: Pressure Injury - Risk of 
Goal: *Prevention of pressure injury Description Document Abe Scale and appropriate interventions in the flowsheet. Outcome: Progressing Towards Goal 
  
Problem: Patient Education: Go to Patient Education Activity Goal: Patient/Family Education Outcome: Progressing Towards Goal

## 2019-05-18 NOTE — CONSULTS
Consult/Admission NAME: Nat Marley :  1935 MRN:  [id number] Date/Time:  2019 4:33 PM 
 
Patient PCP: Savannah James MD 
________________________________________________________________________ Assessment:  
 
Acute Respiratory failure with hypoxia. Insignificant change in Troponin. COPD Hypothyroid Hypokalemia. No hx of heart disease. Ascending thoracic aortic aneurysm. AAA Plan:  
 
Echo is pending . Will review when available. No concern for the Troponin . No studies planned. [x]           High complexity decision making was performed Subjective: CHIEF COMPLAINT:  SOB HISTORY OF PRESENT ILLNESS:    
Sindi Villanueva is a 82 y.o.   female who has above diagnoses. No hx of heart disease. Await echo to review. Past Medical History:  
Diagnosis Date  Abdominal pain   
 nausea, bloating, frequent indigestion, ulcers  Arthritis  Cancer (HCC)   
 squamous cell - leg  Chronic pain  COPD  Dyspepsia and other specified disorders of function of stomach  Easy bruising  Hypercholesterolemia  Osteoporosis  Other ill-defined conditions(799.89)   
 blood transfusion with miscarriage  Other ill-defined conditions(799.89)   
 gout  Other ill-defined conditions(799.89)   
 hematuria, being evaluated by Dr Bennett Bauer  Thyroid disease Past Surgical History:  
Procedure Laterality Date  ABDOMEN SURGERY PROC UNLISTED    
 colon resection  HX APPENDECTOMY  HX BREAST BIOPSY    
 bilateral  
 HX CHOLECYSTECTOMY  12  
 - LAPAROSOCPIC CHOLECYSTECTOMY WITH GRAMS POSSIBLE OPEN  
 HX DILATION AND CURETTAGE    
 HX GI    
 colon surgery [facility]  
 hysterectomy-right ovary removed  HX HEART CATHETERIZATION    
 HX HEENT    
 tonsillectomy  HX ORTHOPAEDIC    
 carpal tunnel-left  HI EGD BALLOON DILATION ESOPHAGUS <30 MM DIAM  2013  TX EGD TRANSORAL BIOPSY SINGLE/MULTIPLE  1/21/2013  TX ERCP W/SPHINCTEROTOMY/PAPILLOTOMY  5/10/2013 Allergies Allergen Reactions  Ciprofloxacin Other (comments) \"high feeling\"  Codeine Other (comments)  
  hallucinations  Cortisone Other (comments) Suicidal thoughts  Nsaids (Non-Steroidal Anti-Inflammatory Drug) Other (comments) Ulcer history  Other Medication Palpitations Iodine dye  Oxycodone Other (comments) Makes pt feel \"high\"  Penicillins Other (comments) Weak feeling  Vytorin 10-10 [Ezetimibe-Simvastatin] Other (comments) Abdominal swelling  Zantac [Ranitidine Hcl] Other (comments) Blisters Meds:  See below Social History Tobacco Use  Smoking status: Current Every Day Smoker Packs/day: 0.50 Years: 65.00 Pack years: 32.50  Smokeless tobacco: Never Used Substance Use Topics  Alcohol use: No  
  
Family History Problem Relation Age of Onset  Heart Disease Mother  Cancer Father   
     lung  Diabetes Father  Diabetes Son REVIEW OF SYSTEMS:   
 []            Unable to obtain  ROS due to --- 
 [x]            Total of 12 systems reviewed as follows: 
 
Constitutional: negative fever, negative chills, negative weight loss Eyes:   negative visual changes ENT:   negative sore throat, tongue or lip swelling Respiratory:  negative cough, negative dyspnea Cards:  negative for chest pain, palpitations, lower extremity edema GI:   negative for nausea, vomiting, diarrhea, and abdominal pain Genitourinary: negative for frequency, dysuria Integument:  negative for rash Hematologic:  negative for easy bruising and gum/nose bleeding Musculoskel: negative for myalgias,  back pain Neurological:  negative for headaches, dizziness, vertigo, weakness Behavl/Psych: negative for feelings of anxiety, depression Pertinent Positives include : 
 
Objective:  
  
Physical Exam: Last 24hrs VS reviewed since prior progress note. Most recent are: 
 
Visit Vitals /47 (BP 1 Location: Right arm, BP Patient Position: At rest) Pulse 98 Temp 98.3 °F (36.8 °C) Resp 20 Ht 4' 7\" (1.397 m) Wt 87.6 kg (193 lb 3.2 oz) SpO2 95% Breastfeeding? No  
BMI 44.90 kg/m² Intake/Output Summary (Last 24 hours) at 5/18/2019 1633 Last data filed at 5/18/2019 9102 Gross per 24 hour Intake 50 ml Output 700 ml Net -650 ml General Appearance: Well developed, well nourished, alert & oriented x 3,  
 no acute distress. Ears/Nose/Mouth/Throat: Pupils equal and round, Hearing grossly normal. 
Neck: Supple. JVP within normal limits. Carotids good upstrokes, with no bruit. Chest: Lungs clear to auscultation bilaterally. Cardiovascular: Regular rate and rhythm, S1S2 normal, no murmur, rubs, gallops. Abdomen: Soft, non-tender, bowel sounds are active. No organomegaly. Extremities: No edema bilaterally. Femoral pulses +2, Distal Pulses +1. Skin: Warm and dry. Neuro: CN II-XII grossly intact, Strength and sensation grossly intact. Data:  
  
Prior to Admission medications Medication Sig Start Date End Date Taking? Authorizing Provider  
bumetanide (BUMEX) 1 mg tablet Take 0.25-0.5 mg by mouth daily as needed (LLE swelling). Yes Provider, Historical  
aspirin/salicylamide/caffeine (BC HEADACHE POWDER PO) Take 1 Packet by mouth daily. Yes Provider, Historical  
albuterol (PROVENTIL HFA, VENTOLIN HFA, PROAIR HFA) 90 mcg/actuation inhaler Take 2 puffs by inhalation every four (4) hours as needed for Wheezing. 10/2/14  Yes Hugo Martinez MD  
 
 
Recent Results (from the past 24 hour(s)) TROPONIN I Collection Time: 05/17/19  6:25 PM  
Result Value Ref Range Troponin-I, Qt. 0.07 (H) <0.05 ng/mL CBC WITH AUTOMATED DIFF Collection Time: 05/18/19  3:04 AM  
Result Value Ref Range WBC 11.0 3.6 - 11.0 K/uL  
 RBC 3.99 3.80 - 5.20 M/uL HGB 12.1 11.5 - 16.0 g/dL HCT 38.0 35.0 - 47.0 % MCV 95.2 80.0 - 99.0 FL  
 MCH 30.3 26.0 - 34.0 PG  
 MCHC 31.8 30.0 - 36.5 g/dL  
 RDW 16.3 (H) 11.5 - 14.5 % PLATELET 637 244 - 329 K/uL MPV 10.2 8.9 - 12.9 FL  
 NRBC 0.0 0  WBC ABSOLUTE NRBC 0.00 0.00 - 0.01 K/uL NEUTROPHILS 85 (H) 32 - 75 % LYMPHOCYTES 6 (L) 12 - 49 % MONOCYTES 8 5 - 13 % EOSINOPHILS 0 0 - 7 % BASOPHILS 0 0 - 1 % IMMATURE GRANULOCYTES 1 (H) 0.0 - 0.5 % ABS. NEUTROPHILS 9.3 (H) 1.8 - 8.0 K/UL  
 ABS. LYMPHOCYTES 0.7 (L) 0.8 - 3.5 K/UL  
 ABS. MONOCYTES 0.9 0.0 - 1.0 K/UL  
 ABS. EOSINOPHILS 0.0 0.0 - 0.4 K/UL  
 ABS. BASOPHILS 0.0 0.0 - 0.1 K/UL  
 ABS. IMM. GRANS. 0.1 (H) 0.00 - 0.04 K/UL  
 DF AUTOMATED    
 RBC COMMENTS NORMOCYTIC, NORMOCHROMIC METABOLIC PANEL, COMPREHENSIVE Collection Time: 05/18/19  3:04 AM  
Result Value Ref Range Sodium 145 136 - 145 mmol/L Potassium 3.4 (L) 3.5 - 5.1 mmol/L Chloride 109 (H) 97 - 108 mmol/L  
 CO2 29 21 - 32 mmol/L Anion gap 7 5 - 15 mmol/L Glucose 133 (H) 65 - 100 mg/dL BUN 15 6 - 20 MG/DL Creatinine 0.93 0.55 - 1.02 MG/DL  
 BUN/Creatinine ratio 16 12 - 20 GFR est AA >60 >60 ml/min/1.73m2 GFR est non-AA 58 (L) >60 ml/min/1.73m2 Calcium 7.8 (L) 8.5 - 10.1 MG/DL Bilirubin, total 1.9 (H) 0.2 - 1.0 MG/DL  
 ALT (SGPT) 12 12 - 78 U/L  
 AST (SGOT) 35 15 - 37 U/L Alk. phosphatase 100 45 - 117 U/L Protein, total 6.2 (L) 6.4 - 8.2 g/dL Albumin 2.6 (L) 3.5 - 5.0 g/dL Globulin 3.6 2.0 - 4.0 g/dL A-G Ratio 0.7 (L) 1.1 - 2.2 BILIRUBIN, DIRECT Collection Time: 05/18/19  3:04 AM  
Result Value Ref Range  Bilirubin, direct 0.7 (H) 0.0 - 0.2 MG/DL

## 2019-05-18 NOTE — PROGRESS NOTES
Bedside shift change report given to Clayton Craven RN (oncoming nurse). Report included the following information SBAR, Kardex and Cardiac Rhythm NSR.  
 
SHIFT SUMMARY: 
 
 
CONCERNS TO ADDRESS WITH MD: 
 
 
 
[facility] NURSING NOTE Admission Date 5/17/2019 Admission Diagnosis Shortness of breath [R06.02] Hypokalemia [E87.6] Consults IP CONSULT TO HOSPITALIST Cardiac Monitoring [x] Yes [] No  
  
Purposeful Hourly Rounding [x] Yes   
Aneta Score Total Score: 3 Aneta score 3 or > [x] Bed Alarm [] Avasys [] 1:1 sitter [] Patient refused (Signed refusal form in chart) Abe Score Abe Score: 18 Abe score 14 or < [] PMT consult [] Wound Care consult  
 []  Specialty bed  [] Nutrition consult Influenza Vaccine Received Flu Vaccine for Current Season (usually Sept-March): No  
 Patient/Guardian Refused (Notify MD): Yes Oxygen needs? [] Room air Oxygen @  []1L    []2L    [x]3L   []4L    []5L   []6L via NC Chronic home O2 use? [] Yes [] No 
Perform O2 challenge test and document in progress note using smartphrase (.Homeoxygen) Last bowel movement Last Bowel Movement Date: 05/17/19 Urinary Catheter External Female Catheter 05/17/19-Urine Output (mL): 700 ml LDAs Peripheral IV 05/17/19 Left Hand (Active) Site Assessment Clean, dry, & intact 5/18/2019  2:35 AM  
Phlebitis Assessment 0 5/18/2019  2:35 AM  
Infiltration Assessment 0 5/18/2019  2:35 AM  
Dressing Status Clean, dry, & intact 5/18/2019  2:35 AM  
Dressing Type Transparent 5/18/2019  2:35 AM  
Hub Color/Line Status Pink;Flushed 5/18/2019  2:35 AM  
      
External Female Catheter 05/17/19 (Active) Site Assessment Clean, dry, & intact 5/18/2019  2:35 AM  
Repositioned Yes 5/18/2019  2:35 AM  
Perineal Care Yes 5/18/2019  2:35 AM  
Wick Changed Yes 5/18/2019  2:35 AM  
Suction Canister/Tubing Changed No 5/17/2019  7:20 PM  
Urine Output (mL) 700 ml 5/17/2019  7:20 PM  
            
  
 Readmission Risk Assessment Tool Score Low Risk   
      
 10 Total Score   
  
 5 Pt. Coverage (Medicare=5 , Medicaid, or Self-Pay=4) 5 Charlson Comorbidity Score (Age + Comorbid Conditions) Criteria that do not apply:  
 Has Seen PCP in Last 6 Months (Yes=3, No=0) . Living with Significant Other. Assisted Living. LTAC. SNF. or  
Rehab Patient Length of Stay (>5 days = 3) IP Visits Last 12 Months (1-3=4, 4=9, >4=11) Expected Length of Stay 3d 19h Actual Length of Stay 1

## 2019-05-18 NOTE — PROGRESS NOTES
Reason for Admission:   SOB 
            
RRAT Score:     10 Resources/supports as identified by patient/family:   Pt has supportive daughter Top Challenges facing patient (as identified by patient/family and CM): Finances/Medication cost?      Pt with Medicare A/B with [employer]  Supplement. Transportation? Pt bed bound, will need BLS transport at Discharge Support system or lack thereof? Pt has Dejuan Benton who provides all care Living arrangements? Pt lives with daughter in a 1 story home, 5 steps to enter. Pt sleeps in recliner chair with BSC steps away. Self-care/ADLs/Cognition? Pt sitting up in bed receiving nursing care. Pt alert and oriented to person, place, time and situation. Pt verified demographic information with CM. Per pt she is unable to care for self, daughter performs all ADL's pt states she is wheelchair bound and need assistance to stand. Pt states \" I really don't walk, maybe a few steps at most\". Per pt they are checking into a wheelchair ramp for home. CM reached out to Caregiver - daughter. Per daughter last time MD attempted pt fell, EMS was called to get pt up. Daughter did state she has used [facility] in past due to inability to get to PCP office. Per daughter DME to include Wheelchair, Rolling walker, bedside commode. Baths are given by daughter in bed/chair utilizing a basin. Current Advanced Directive/Advance Care Plan:  Not on file Plan for utilizing home health: Will use if indicated Likelihood of readmission:  High 
              
Transition of Care Plan:      
1) Home with HH Vs DC to SNF  
2) Pt would benefit from Home based primary care referral 
3) Pt would benefit from ACP/ Palliative discussion 4) Pt would benefit from [facility] Yair referral at [place] 
 5) Pt will need 2nd IM letter prior to DC Care Management Interventions PCP Verified by CM: Yes(over 6 months per pt) Mode of Transport at Discharge: BLS(pt bedbound) Transition of Care Consult (CM Consult): Discharge Planning Discharge Durable Medical Equipment: No 
Physical Therapy Consult: Yes Occupational Therapy Consult: No 
Speech Therapy Consult: No 
Current Support Network: Own Home, Lives with Caregiver Confirm Follow Up Transport: Other (see comment)(pt unable to ambulate, needs assitance or Homebound services) Plan discussed with Pt/Family/Caregiver: Yes(cm spoke with pt in room and called caregiver [place]) Discharge Location Discharge Placement: Unable to determine at this time CM to remain available for support and DC planning Cheryle Rilee. RN, BSN [facility] [phone]

## 2019-05-19 NOTE — PROGRESS NOTES
Problem: Mobility Impaired (Adult and Pediatric) Goal: *Acute Goals and Plan of Care (Insert Text) Description Physical Therapy Goals Initiated 5/19/2019 1. Patient will move from supine to sit and sit to supine , scoot up and down and roll side to side in bed with minimal assistance/contact guard assist within 7 day(s). 2.  Patient will transfer from bed to chair and chair to bed with modified independence using the least restrictive device within 7 day(s). 3.  Patient will perform sit to stand with modified independence within 7 day(s). 4.  Patient will ascend/descend 4 stairs with bilateral handrail(s) with minimal assistance/contact guard assist within 7 day(s). Outcome: Not Progressing Towards Goal 
 PHYSICAL THERAPY EVALUATION Patient: Erwin Ruvalcaba (43 y.o. female) Date: 5/19/2019 Primary Diagnosis: Shortness of breath [R06.02] Hypokalemia [E87.6] Precautions:     
 
ASSESSMENT Based on the objective data described below, the patient presents with decreased strength, decreased endurance, impaired balance, and decreased functional mobility. Current Level of Function (mobility/balance, ADL): patient required min A x 1 for bed mobility and transfers to the bedside chair. O2 sats dropped to 87% on 2L O2 with transfer and noted SOB. Functional Outcome Measure: The patient scored 40/100 on the Barthel outcome measure which is indicative of moderate functional impairment. Other factors to consider for discharge: sedentary, close to baseline, O2 needs Patient will benefit from skilled therapy intervention to address the above noted impairments. [facility] with 24/7 assist (daughter), in order for the patient to meet his/her long term goals. PLAN : 
Recommendations and Planned Interventions: bed mobility training, transfer training, therapeutic exercises, patient and family training/education and therapeutic activities Frequency/Duration: Patient will be followed by physical therapy  3 times a week to address goals. SUBJECTIVE:  
Patient stated ? I don't walk really. My heart is bad.? OBJECTIVE DATA SUMMARY:  
HISTORY:   
Past Medical History:  
Diagnosis Date Abdominal pain   
 nausea, bloating, frequent indigestion, ulcers Arthritis Cancer (HCC)   
 squamous cell - leg Chronic pain COPD Dyspepsia and other specified disorders of function of stomach Easy bruising Hypercholesterolemia Osteoporosis Other ill-defined conditions(799.89)   
 blood transfusion with miscarriage Other ill-defined conditions(799.89)   
 gout Other ill-defined conditions(799.89) 1/13  
 hematuria, being evaluated by Dr Mikaela Richardson Thyroid disease Past Surgical History:  
Procedure Laterality Date ABDOMEN SURGERY PROC UNLISTED    
 colon resection HX APPENDECTOMY HX BREAST BIOPSY    
 bilateral  
 HX CHOLECYSTECTOMY  11/26/12  
 - LAPAROSOCPIC CHOLECYSTECTOMY WITH GRAMS POSSIBLE OPEN  
 HX DILATION AND CURETTAGE    
 HX GI    
 colon surgery [id number] [facility] [address]  
 hysterectomy-right ovary removed HX HEART CATHETERIZATION    
 HX HEENT    
 tonsillectomy HX ORTHOPAEDIC    
 carpal tunnel-left OK EGD BALLOON DILATION ESOPHAGUS <30 MM DIAM  1/21/2013 OK EGD TRANSORAL BIOPSY SINGLE/MULTIPLE  1/21/2013 OK ERCP W/SPHINCTEROTOMY/PAPILLOTOMY  5/10/2013 Prior Level of Function/Home Situation: patient lives with her daughter who assists her with all ADLS. Patient stays in a recliner and pivots over to a [facility] for toileting. She does not ambulate at baseline. She does not wear home O2. Reports occasional falls. Personal factors and/or comorbidities impacting plan of care: falls, SOB, poor endurance Home Situation Home Environment: Private residence # Steps to Enter: 4 Rails to Enter: Yes Hand Rails : Bilateral 
One/Two Story Residence: One story Living Alone: No 
 Support Systems: Child(hermilo), Family member(s) Patient Expects to be Discharged to[de-identified] Unknown Current DME Used/Available at Home: Commode, bedside, Walker, rolling Tub or Shower Type: (sponge bathes) EXAMINATION/PRESENTATION/DECISION MAKING:  
Critical Behavior: 
Neurologic State: Alert Orientation Level: Oriented X4 Cognition: Appropriate for age attention/concentration Hearing: Auditory Auditory Impairment: None Skin:   
Edema:  
Range Of Motion: 
AROM: Generally decreased, functional 
  
  
  
PROM: Within functional limits Strength:   
Strength: Generally decreased, functional 
  
  
  
  
  
  
Tone & Sensation:  
Tone: Normal 
  
  
  
  
Sensation: Intact Coordination: 
Coordination: Within functional limits Vision:  
  
Functional Mobility: 
Bed Mobility: 
Rolling: Contact guard assistance Supine to Sit: Minimum assistance Scooting: Contact guard assistance Transfers: 
Sit to Stand: Contact guard assistance Stand to Sit: Minimum assistance Stand Pivot Transfers: Contact guard assistance;Minimum assistance Bed to Chair: Minimum assistance Balance:  
Sitting: Intact; Without support Standing: Impaired; With support Standing - Static: Constant support; Fair 
Standing - Dynamic : Fair Ambulation/Gait Training: 
  
  
  
  
Gait Description (WDL): (a few steps to the chair) Stairs: Therapeutic Exercises: PLB cues to improve O2 sats Functional Measure: 
Barthel Index: 
Bathin Bladder: 5 Bowels: 10 
Groomin Dressin Feeding: 10 Mobility: 0 Stairs: 0 Toilet Use: 0 Transfer (Bed to Chair and Back): 10 Total: 40/100 Percentage of impairment  
0% 1-19% 20-39% 40-59% 60-79% 80-99% 100% Barthel Score 0-100 100 99-80 79-60 59-40 20-39 1-19 
 0 The Barthel ADL Index: Guidelines 1.  The index should be used as a record of what a patient does, not as a record of what a patient could do. 2. The main aim is to establish degree of independence from any help, physical or verbal, however minor and for whatever reason. 3. The need for supervision renders the patient not independent. 4. A patient's performance should be established using the best available evidence. Asking the patient, friends/relatives and nurses are the usual sources, but direct observation and common sense are also important. However direct testing is not needed. 5. Usually the patient's performance over the preceding 24-48 hours is important, but occasionally longer periods will be relevant. 6. Middle categories imply that the patient supplies over 50 per cent of the effort. 7. Use of aids to be independent is allowed. Charlie Cary., Barthel, D.W. (6001). Functional evaluation: the Barthel Index. [address] [facility] (14)2. Mayela Swenson vy ADAM Coelho, Amelie Hyatt., Miracle Miles., [place], [facility] [facility] (1999). Measuring the change indisability after inpatient rehabilitation; comparison of the responsiveness of the Barthel Index and Functional [place] Measure. Journal of Neurology, Neurosurgery, and Psychiatry, 66(4), 890-998. Walter Rivera, N.J.A, SAI Nichols.KATERINE, & Wilfredo Salgado, MFARA. (2004.) Assessment of post-stroke quality of life in cost-effectiveness studies: The usefulness of the Barthel Index and the EuroQoL-5D. [facility], 13, [phone] Physical Therapy Evaluation Charge Determination History Examination Presentation Decision-Making MEDIUM  Complexity : 1-2 comorbidities / personal factors will impact the outcome/ POC  MEDIUM Complexity : 3 Standardized tests and measures addressing body structure, function, activity limitation and / or participation in recreation  MEDIUM Complexity : Evolving with changing characteristics  Other outcome measures Barthel  MEDIUM Based on the above components, the patient evaluation is determined to be of the following complexity level: MEDIUM Activity Tolerance:  
Fair and desaturates with exertion and requires oxygen. O2 sats dropped to 87% with activity on 2L O2. Please refer to the flowsheet for vital signs taken during this treatment. After treatment patient left:  
Up in chair Call light within reach RN notified Family at bedside COMMUNICATION/EDUCATION:  
The patient?s plan of care was discussed with: Registered Nurse and . Fall prevention education was provided and the patient/caregiver indicated understanding., Patient/family have participated as able in goal setting and plan of care. and Patient/family agree to work toward stated goals and plan of care. Thank you for this referral. 
Yeimi Mackenzie, PT, DPT Time Calculation: 23 mins

## 2019-05-19 NOTE — PROGRESS NOTES
Consult received Recommend CT Surg consult for ascending TAAA Nothing required for descending portion.

## 2019-05-19 NOTE — PROGRESS NOTES
Problem: Falls - Risk of 
Goal: *Absence of Falls Description Document Villanuevarambo Valdez Fall Risk and appropriate interventions in the flowsheet. Outcome: Progressing Towards Goal 
Note:  
Fall Risk Interventions: 
Mobility Interventions: Bed/chair exit alarm, Communicate number of staff needed for ambulation/transfer, Patient to call before getting OOB, Strengthening exercises (ROM-active/passive), PT Consult for assist device competence, PT Consult for mobility concerns Medication Interventions: Bed/chair exit alarm Elimination Interventions: Bed/chair exit alarm, Call light in reach, Patient to call for help with toileting needs, Stay With Me (per policy), Toilet paper/wipes in reach, Toileting schedule/hourly rounds History of Falls Interventions: Bed/chair exit alarm, Room close to nurse's station, Door open when patient unattended

## 2019-05-19 NOTE — PROGRESS NOTES
Bedside shift change report given to Jayde Singh RN (oncoming nurse). Report included the following information SBAR, Kardex and Cardiac Rhythm NSR.  
 
SHIFT SUMMARY: 
Pt has excoriation under skin folds, applied protective ointment, redness noted on sacrum, it is blanchable applied protective foam on sacrum and will continue to monitor CONCERNS TO ADDRESS WITH MD: 
 
 
 
[facility] NURSING NOTE Admission Date 5/17/2019 Admission Diagnosis Shortness of breath [R06.02] Hypokalemia [E87.6] Consults IP CONSULT TO HOSPITALIST 
IP CONSULT TO VASCULAR SURGERY 
IP CONSULT TO CARDIOLOGY Cardiac Monitoring [x] Yes [] No  
  
Purposeful Hourly Rounding [x] Yes   
Aneta Score Total Score: 4 Aneta score 3 or > [x] Bed Alarm [] Avasys [] 1:1 sitter [] Patient refused (Signed refusal form in chart) Abe Score Abe Score: 16 Abe score 14 or < [] PMT consult [] Wound Care consult  
 []  Specialty bed  [] Nutrition consult Influenza Vaccine Received Flu Vaccine for Current Season (usually Sept-March): No  
 Patient/Guardian Refused (Notify MD): Yes Oxygen needs? [] Room air Oxygen @  []1L    [x]2L    []3L   []4L    []5L   []6L via NC Chronic home O2 use? [] Yes [] No 
Perform O2 challenge test and document in progress note using smartphrase (.Homeoxygen) Last bowel movement Last Bowel Movement Date: 05/17/19 Urinary Catheter External Female Catheter 05/17/19-Urine Output (mL): 700 ml LDAs Peripheral IV 05/17/19 Left Hand (Active) Site Assessment Clean, dry, & intact 5/18/2019  2:35 AM  
Phlebitis Assessment 0 5/18/2019  2:35 AM  
Infiltration Assessment 0 5/18/2019  2:35 AM  
Dressing Status Clean, dry, & intact 5/18/2019  2:35 AM  
Dressing Type Transparent 5/18/2019  2:35 AM  
Hub Color/Line Status Pink;Flushed 5/18/2019  2:35 AM  
   
Peripheral IV 05/18/19 Left Arm (Active) Site Assessment Clean, dry, & intact 5/18/2019  7:44 PM  
 Phlebitis Assessment 0 5/18/2019  7:44 PM  
Infiltration Assessment 0 5/18/2019  7:44 PM  
Dressing Status Clean, dry, & intact 5/18/2019  7:44 PM  
Dressing Type Transparent 5/18/2019  7:44 PM  
Hub Color/Line Status Blue;Flushed 5/18/2019  7:44 PM  
      
External Female Catheter 05/17/19 (Active) Site Assessment Clean, dry, & intact 5/18/2019  7:44 PM  
Repositioned Yes 5/18/2019  7:44 PM  
Perineal Care Yes 5/18/2019  7:44 PM  
Wick Changed Yes 5/18/2019  7:44 PM  
Suction Canister/Tubing Changed No 5/18/2019  7:44 PM  
Urine Output (mL) 700 ml 5/17/2019  7:20 PM  
            
  
Readmission Risk Assessment Tool Score Low Risk   
      
 10 Total Score   
  
 5 Pt. Coverage (Medicare=5 , Medicaid, or Self-Pay=4) 5 Charlson Comorbidity Score (Age + Comorbid Conditions) Criteria that do not apply:  
 Has Seen PCP in Last 6 Months (Yes=3, No=0) . Living with Significant Other. Assisted Living. LTAC. SNF. or  
Rehab Patient Length of Stay (>5 days = 3) IP Visits Last 12 Months (1-3=4, 4=9, >4=11) Expected Length of Stay 3d 19h Actual Length of Stay 2

## 2019-05-19 NOTE — PROGRESS NOTES
Hospitalist Progress Note NAME: Yaima Finn :  1935 MRN:  [id number] Assessment / Plan: 
Acute hypoxic respiratory failure Suspected acute on chronic COPD exacerbation, r/o CHF 
-Chest x-ray is not clear and mentioned that airspace disease effusion cannot be ruled out. -CTA chest, no PE, L pleural effusion 
-cont empiric DuoNeb, azithromycin and Solu-Medrol 
-Continue oxygen by nasal cannula to maintain saturations around 92% 
-cont Mucinex 
-wean o2, does not have at home 
-flutter valve, sputum cx if able 
-start to antonio solumedrol 
  
Severe hypokalemia 
-Patient has been replaced with KCl, K better from 2.3 to 3.4, 3.4 again today 
-cont po supp 
-on bumex now 
-follow maranda 
-magnesium level 1.8, give some po amg Hypothyroidism, uncontrolled Per daughter, patient has not been taking her levothyroxine since last 2 months -TSH currently is 33.8 
-Resumed home levothyroxine and check thyroid function testing in 4 weeks History of fluid retention per patient 
-No previous diagnosis of congestive heart failure per patient -now on Bumex 
-echo-p 
  
Constipation 
-Last BM was 4 days ago. No abdominal pain 
- give carmelo colace and lactulose x 1. 
  
Total hyperbilirubinemia 
-lft's otherwise are normal.  
-passive liver congestion?  
-bili 2.7 to 1.9 today, mostly inderect 
-normal us, absent GB 
-follow Mild troponin elevation, ? demand ischemia ? CHF History of coronary artery disease cath in  
-Patient reports recent swelling and weight gain (but noncompliant with meds) -We will check an echocardiogram 
-will ask for cardiology consultation 
-Patient's chest CT done on admission did not show PE but revealed an ascending thoracic aortic aneurysm of 5.9 cm in diameter and a descending thoracic aortic aneurysm of 4.5 cm, no comparisons in the left pleural effusion with lower lobe atelectasis 
-patient was aware of the aneurysm but has never been referred to vascular surgery, perhaps because she is not a candidate for surgery given her age and comorbidities and risk 
-We will ask for formal vascular evaluation as it is possible that this may also be affecting her heart and respiratory symptoms 
-bnp 226 today (but got some diuresis)  
-cont bumex to IV for now Noncompliance with medications 
-Daughter reports she stopped her meds about 2 months ago Acute metabolic encephalopathy 
-this may have been due to hypoxemia on admit, improved we will follow closely 
-Consider imaging if needed 
-She is also significantly hypothyroid due to noncompliance with her medications which may be affecting her mentation as well 
-neurochecks Code Status: Partial, No Intubation, okay for chest compressions, shock and meds Surrogate Decision Maker: Bridgett Hagan 
  
DVT Prophylaxis: heparin GI Prophylaxis: not indicated 
  
Baseline: From home recliner bound at home, daughter 24hr 40 or above Morbid obesity / Body mass index is 46.01 kg/m². Recommended Disposition: Home w/Family and HH PT, OT, RN ? O2, try to wean Subjective: Chief Complaint / Reason for Physician Visit 
sob 5/18 Patient reports she came to the hospital because she did not feel right to me she denied shortness of breath or chest pain. According to the daughter who I spoke to on the phone reported that she seemed to have some trouble breathing and was feeling very anxious and could not quite say what was wrong. Patient does not use oxygen at home but has a history of COPD and is followed by Dr. Meenakshi Napoles as an outpatient. She has had a cough but she says with clear sputum. She denies any fevers or chills. She has had some swelling in her legs. I confirmed her history with her daughter who cares for her 24/7. She is essentially chair bound and resides in a recliner due to poor balance. And her ADLs are done by her daughter.   The patient herself refused to take her medications about 2 months ago stopped taking her fluid pill, her potassium her thyroid medicine because she thought it was affecting her stomach. The daughter did notice in the last couple days that her mental status has been off and on but she does not have a diagnosis of dementia. The daughter confirms that the patient has been told she has and aneurysm in her aorta that was diagnosed by Dr. Ash Bruno but has never been referred to a vascular surgeon. She has a history of coronary artery disease but no interventions or cardiac follow-up since 2013 and no reported history of congestive heart failure. The patient denies any chest pain. She denies any current shortness of breath but is on 3 L of oxygen. She denies any nausea vomiting or abdominal pain. She denies any urinary symptoms. 5/19 pt occ cough, dry, denies sob on NC still. No cp. Wants her breakfast at the bedside now. We discussed compliance with meds, can't tell me why stopped taking all her meds Patient was evaluated at 9:30 AM 
 
 
 
Discussed with RN events overnight. Review of Systems: 
Symptom Y/N Comments  Symptom Y/N Comments Fever/Chills    Chest Pain n   
Poor Appetite n   Edema Cough y   Abdominal Pain n   
Sputum n   Joint Pain SOB/MADERA n   Pruritis/Rash Nausea/vomit n   Tolerating PT/OT Diarrhea    Tolerating Diet y Constipation    Other Could NOT obtain due to:   
 
Objective: VITALS:  
Last 24hrs VS reviewed since prior progress note. Most recent are: 
Patient Vitals for the past 24 hrs: 
 Temp Pulse Resp BP SpO2  
05/19/19 0742     90 % 05/19/19 0737 98.2 °F (36.8 °C) 83 20 136/64 92 % 05/19/19 0311 98.4 °F (36.9 °C) 89 22 128/54 92 % 05/18/19 2239     93 % 05/18/19 2234 98 °F (36.7 °C) 87 22 122/59 (!) 89 % 05/18/19 2149  93  112/72   
05/18/19 1944     92 % 05/18/19 1934 98.4 °F (36.9 °C) 92 24 103/51 90 % 05/18/19 1537     95 % 05/18/19 1533 98.3 °F (36.8 °C) 98 20 100/47 94 % 05/18/19 1127 99 °F (37.2 °C) 92 20 95/42 91 % 05/18/19 1101     94 % Intake/Output Summary (Last 24 hours) at 5/19/2019 1046 Last data filed at 5/19/2019 7282 Gross per 24 hour Intake  Output 500 ml Net -500 ml PHYSICAL EXAM: 
Patient is awake and alert on nasal cannula, less dyspnea today no distress. She is oriented to self, hospital, had a little trouble with the month and the year knows the president. Conjunctiva slightly pale mucous membranes are dry cardiovascular regular rate systolic murmur no rubs or gallops. Lungs diminished throughout poor air movement still with expiratory wheezes and rhonchi. Abdomen is obese bowel sounds present soft nontender. Extremities currently no clubbing cyanosis or edema. Reviewed most current lab test results and cultures  YES Reviewed most current radiology test results   YES Review and summation of old records today    NO Reviewed patient's current orders and MAR    YES 
PMH/ reviewed - no change compared to H&P 
________________________________________________________________________ Care Plan discussed with: 
  Comments Patient y Family RN y   
Care Manager Consultant  y cards Multidiciplinary team rounds were held today with , nursing, pharmacist and clinical coordinator. Patient's plan of care was discussed; medications were reviewed and discharge planning was addressed. ________________________________________________________________________ Total NON critical care TIME:    Minutes Total CRITICAL CARE TIME Spent:   Minutes non procedure based Comments >50% of visit spent in counseling and coordination of care    
________________________________________________________________________ Jennifer Galvan MD  
 
Procedures: see electronic medical records for all procedures/Xrays and details which were not copied into this note but were reviewed prior to creation of Plan. LABS: 
I reviewed today's most current labs and imaging studies. Pertinent labs include: 
Recent Labs 05/19/19 
0134 05/18/19 
0304 05/17/19 
1496 WBC 13.2* 11.0 8.5 HGB 11.3* 12.1 13.3 HCT 35.3 38.0 40.2  203 218 Recent Labs 05/19/19 0134 05/18/19 0304 05/17/19 
5658  145 142  
K 3.4* 3.4* 2.3*  
 109* 104 CO2 24 29 31 * 133* 132* BUN 22* 15 12 CREA 1.00 0.93 0.77 CA 7.7* 7.8* 8.2* MG 1.8  --   --   
PHOS 3.0  --   --   
ALB 2.0* 2.6* 2.9* TBILI 1.3* 1.9* 2.7* SGOT 46* 35 38* ALT 13 12 12 Signed: Goldie Quinn MD

## 2019-05-19 NOTE — PROGRESS NOTES
ADULT PROTOCOL: JET AEROSOL  REASSESSMENT Patient  Nat Marley     82 y.o.   female     5/19/2019  5:59 PM 
 
Breath Sounds Pre Procedure: Right Breath Sounds: Wheezing Left Breath Sounds: Wheezing Breath Sounds Post Procedure: Right Breath Sounds: Expiratory wheezing Left Breath Sounds: Expiratory wheezing Breathing pattern: Pre procedure Breathing Pattern: Regular Post procedure Breathing Pattern: Regular Heart Rate: Pre procedure Pulse: 83 
         Post procedure Pulse: 84 Resp Rate: Pre procedure Respirations: 18 Post procedure Respirations: 20 
 
 
Cough: Pre procedure Cough: Non-productive Post procedure Cough: Non-productive Oxygen: O2 Device: Nasal cannula Changed: NO SpO2: Pre procedure SpO2: 90 % Post procedure SpO2: 91 % Nebulizer Therapy: Current medications Aerosolized Medications: DuoNeb Changed: NO 
 
 
 
Problem List:  
Patient Active Problem List  
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  
 Confusion R41.0  Shortness of breath R06.02 Respiratory Therapist: Kala Pollock RT

## 2019-05-19 NOTE — CONSULTS
Consult/Admission NAME: Irene Mendez :  1935 MRN:  [id number] Date/Time:  2019 4:33 PM 
 
Patient PCP: Rafael Fofana MD 
________________________________________________________________________ Assessment:  
 
Acute Respiratory failure with hypoxia. Insignificant change in Troponin. COPD Hypothyroid Hypokalemia. No hx of heart disease. Ascending thoracic aortic aneurysm. AAA 
 
 
   Cardiac status stable. Echo pending. Plan:  
 
Echo is pending . Will review when available. No concern for the Troponin . No studies planned. [x]           High complexity decision making was performed Subjective: CHIEF COMPLAINT:  SOB HISTORY OF PRESENT ILLNESS:    
Aleksandr Diehl is a 82 y.o.   female who has above diagnoses. No hx of heart disease. Await echo to review. Past Medical History:  
Diagnosis Date  Abdominal pain   
 nausea, bloating, frequent indigestion, ulcers  Arthritis  Cancer (HCC)   
 squamous cell - leg  Chronic pain  COPD  Dyspepsia and other specified disorders of function of stomach  Easy bruising  Hypercholesterolemia  Osteoporosis  Other ill-defined conditions(799.89)   
 blood transfusion with miscarriage  Other ill-defined conditions(799.89)   
 gout  Other ill-defined conditions(799.89)   
 hematuria, being evaluated by Dr Moses Goodwin  Thyroid disease Past Surgical History:  
Procedure Laterality Date  ABDOMEN SURGERY PROC UNLISTED    
 colon resection  HX APPENDECTOMY  HX BREAST BIOPSY    
 bilateral  
 HX CHOLECYSTECTOMY  12  
 - LAPAROSOCPIC CHOLECYSTECTOMY WITH GRAMS POSSIBLE OPEN  
 HX DILATION AND CURETTAGE    
 HX GI    
 colon surgery [address]  
 hysterectomy-right ovary removed  HX HEART CATHETERIZATION    
 HX HEENT    
 tonsillectomy  HX ORTHOPAEDIC    
 carpal tunnel-left  NC EGD BALLOON DILATION ESOPHAGUS <30 MM DIAM  1/21/2013  NC EGD TRANSORAL BIOPSY SINGLE/MULTIPLE  1/21/2013  NC ERCP W/SPHINCTEROTOMY/PAPILLOTOMY  5/10/2013 Allergies Allergen Reactions  Ciprofloxacin Other (comments) \"high feeling\"  Codeine Other (comments)  
  hallucinations  Cortisone Other (comments) Suicidal thoughts  Nsaids (Non-Steroidal Anti-Inflammatory Drug) Other (comments) Ulcer history  Other Medication Palpitations Iodine dye  Oxycodone Other (comments) Makes pt feel \"high\"  Penicillins Other (comments) Weak feeling  Vytorin 10-10 [Ezetimibe-Simvastatin] Other (comments) Abdominal swelling  Zantac [Ranitidine Hcl] Other (comments) Blisters Meds:  See below Social History Tobacco Use  Smoking status: Current Every Day Smoker Packs/day: 0.50 Years: 65.00 Pack years: 32.50  Smokeless tobacco: Never Used Substance Use Topics  Alcohol use: No  
  
Family History Problem Relation Age of Onset  Heart Disease Mother  Cancer Father   
     lung  Diabetes Father  Diabetes Son REVIEW OF SYSTEMS:   
 []            Unable to obtain  ROS due to --- 
 [x]            Total of 12 systems reviewed as follows: 
 
Constitutional: negative fever, negative chills, negative weight loss Eyes:   negative visual changes ENT:   negative sore throat, tongue or lip swelling Respiratory:  negative cough, negative dyspnea Cards:  negative for chest pain, palpitations, lower extremity edema GI:   negative for nausea, vomiting, diarrhea, and abdominal pain Genitourinary: negative for frequency, dysuria Integument:  negative for rash Hematologic:  negative for easy bruising and gum/nose bleeding Musculoskel: negative for myalgias,  back pain Neurological:  negative for headaches, dizziness, vertigo, weakness Behavl/Psych: negative for feelings of anxiety, depression Pertinent Positives include : 
 
Objective:  
  
Physical Exam: 
 
Last 24hrs VS reviewed since prior progress note. Most recent are: 
 
Visit Vitals /57 (BP 1 Location: Right arm, BP Patient Position: Supine) Pulse 86 Temp 99.2 °F (37.3 °C) Resp 20 Ht 4' 7\" (1.397 m) Wt 89.8 kg (197 lb 15.6 oz) SpO2 93% Breastfeeding? No  
BMI 46.01 kg/m² Intake/Output Summary (Last 24 hours) at 5/19/2019 1224 Last data filed at 5/19/2019 3400 Gross per 24 hour Intake  Output 500 ml Net -500 ml General Appearance: Well developed, well nourished, alert & oriented x 3,  
 no acute distress. Ears/Nose/Mouth/Throat: Pupils equal and round, Hearing grossly normal. 
Neck: Supple. JVP within normal limits. Carotids good upstrokes, with no bruit. Chest: Lungs clear to auscultation bilaterally. Cardiovascular: Regular rate and rhythm, S1S2 normal, no murmur, rubs, gallops. Abdomen: Soft, non-tender, bowel sounds are active. No organomegaly. Extremities: No edema bilaterally. Femoral pulses +2, Distal Pulses +1. Skin: Warm and dry. Neuro: CN II-XII grossly intact, Strength and sensation grossly intact. Data:  
  
Prior to Admission medications Medication Sig Start Date End Date Taking? Authorizing Provider  
bumetanide (BUMEX) 1 mg tablet Take 0.25-0.5 mg by mouth daily as needed (LLE swelling). Yes Provider, Historical  
aspirin/salicylamide/caffeine (BC HEADACHE POWDER PO) Take 1 Packet by mouth daily. Yes Provider, Historical  
albuterol (PROVENTIL HFA, VENTOLIN HFA, PROAIR HFA) 90 mcg/actuation inhaler Take 2 puffs by inhalation every four (4) hours as needed for Wheezing. 10/2/14  Yes Kathleen Gonzalez MD  
 
 
Recent Results (from the past 24 hour(s)) CBC WITH AUTOMATED DIFF Collection Time: 05/19/19  1:34 AM  
Result Value Ref Range WBC 13.2 (H) 3.6 - 11.0 K/uL  
 RBC 3.72 (L) 3.80 - 5.20 M/uL  
 HGB 11.3 (L) 11.5 - 16.0 g/dL HCT 35.3 35.0 - 47.0 % MCV 94.9 80.0 - 99.0 FL  
 MCH 30.4 26.0 - 34.0 PG  
 MCHC 32.0 30.0 - 36.5 g/dL  
 RDW 16.2 (H) 11.5 - 14.5 % PLATELET 402 944 - 780 K/uL MPV 10.2 8.9 - 12.9 FL  
 NRBC 0.0 0  WBC ABSOLUTE NRBC 0.00 0.00 - 0.01 K/uL NEUTROPHILS 90 (H) 32 - 75 % LYMPHOCYTES 3 (L) 12 - 49 % MONOCYTES 6 5 - 13 % EOSINOPHILS 0 0 - 7 % BASOPHILS 0 0 - 1 % IMMATURE GRANULOCYTES 1 (H) 0.0 - 0.5 % ABS. NEUTROPHILS 11.9 (H) 1.8 - 8.0 K/UL  
 ABS. LYMPHOCYTES 0.4 (L) 0.8 - 3.5 K/UL  
 ABS. MONOCYTES 0.8 0.0 - 1.0 K/UL  
 ABS. EOSINOPHILS 0.0 0.0 - 0.4 K/UL  
 ABS. BASOPHILS 0.0 0.0 - 0.1 K/UL  
 ABS. IMM. GRANS. 0.1 (H) 0.00 - 0.04 K/UL  
 DF AUTOMATED METABOLIC PANEL, BASIC Collection Time: 05/19/19  1:34 AM  
Result Value Ref Range Sodium 139 136 - 145 mmol/L Potassium 3.4 (L) 3.5 - 5.1 mmol/L Chloride 107 97 - 108 mmol/L  
 CO2 24 21 - 32 mmol/L Anion gap 8 5 - 15 mmol/L Glucose 143 (H) 65 - 100 mg/dL BUN 22 (H) 6 - 20 MG/DL Creatinine 1.00 0.55 - 1.02 MG/DL  
 BUN/Creatinine ratio 22 (H) 12 - 20 GFR est AA >60 >60 ml/min/1.73m2 GFR est non-AA 53 (L) >60 ml/min/1.73m2 Calcium 7.7 (L) 8.5 - 10.1 MG/DL MAGNESIUM Collection Time: 05/19/19  1:34 AM  
Result Value Ref Range Magnesium 1.8 1.6 - 2.4 mg/dL PHOSPHORUS Collection Time: 05/19/19  1:34 AM  
Result Value Ref Range Phosphorus 3.0 2.6 - 4.7 MG/DL  
HEPATIC FUNCTION PANEL Collection Time: 05/19/19  1:34 AM  
Result Value Ref Range Protein, total 5.7 (L) 6.4 - 8.2 g/dL Albumin 2.0 (L) 3.5 - 5.0 g/dL Globulin 3.7 2.0 - 4.0 g/dL A-G Ratio 0.5 (L) 1.1 - 2.2 Bilirubin, total 1.3 (H) 0.2 - 1.0 MG/DL Bilirubin, direct 0.2 0.0 - 0.2 MG/DL Alk. phosphatase 89 45 - 117 U/L  
 AST (SGOT) 46 (H) 15 - 37 U/L  
 ALT (SGPT) 13 12 - 78 U/L  
HEMOGLOBIN A1C WITH EAG Collection Time: 05/19/19  1:34 AM  
Result Value Ref Range Hemoglobin A1c 4.5 4.2 - 6.3 % Est. average glucose Cannot be calculated mg/dL NT-PRO BNP Collection Time: 05/19/19  7:55 AM  
Result Value Ref Range  NT pro- <450 PG/ML

## 2019-05-19 NOTE — PROGRESS NOTES
Got a call from [facility] lab about the lipid panel blood sample not being enough for the test. Primary nurse notified.

## 2019-05-20 NOTE — PROGRESS NOTES
Transitional Care Team: Initial G Note    Date of Assessment: 05/20/19  Time of Assessment:  1:07 PM    Stacey Miller is a 82 y.o. female inpatient at [facility]. Assessment & Plan   Pt with long Hx of COPD. Now deconditioned to point she is home bound per her report and daughter statement. Pt had stopped her thyroid meds claiming heartburn. Has been counseled to resume. Pt and daughter state they are not satisfied with care at Dr Jillian Walker office. Urged them to poll friends and family to get idea of which PCP they may wish to choose as new PCP. Pt not able to get follow up appointment at Dr Jillian Walker office, set up with Tyler Mazariegos for follow up visit. Pt and daughter state pt nearly home bound with difficulty to get out to physicians offices for appointments. Provided number for Home Based Primary Care for they to see if ppt meets ctriteria for acceptance into that program.Arrangements underway to set up home oxygen. Pt states has wheelchair ramp builders to come in 2 weeks     Primary Diagnosis: COPD  Advance Directive:  Pt has no advanced care directive in her medical record. Current Code Status:  partial code(  No intubation) while hospitalized- becomes inactive on discharge    Referral to Hospice/ Palliative Care Appropriate: yes. Discussed with pt and daughter to contact Home based Primary care, with potential need also for palliative care services    Awareness of Medical Conditions: (Trajectory of illness and pts expectations). Pt aware breathing status will continue to worsen   Discharge Needs: (to include safety issues)home O2    Barriers Identified: none  Patient is willing to go to SNF/Inpatient Rehab if recommended: yes    Medication Review:  Was performed. Can patient afford medications:  yes.     Patient is Compliant with Medication regimen: no ( claims will resume meds as ordered)    Who manages medications at home: self  Best Patient Contact Number:  [phone]    [facility] (Healthy Understanding of Goals) program introduced to patient/family. The Transitional Care Team bridges the gaps in care and education surrounding discharge from the acute care facility. The objective is to empower the patient and family in taking a proactive role in preventing readmission within the first thirty days after discharge. The team is also involved in the efforts to reduce readmission to the acute care setting after stabilization and discharge from the acute care environment either to skilled nursing facilities or community. TONY RN/Ino Calendar/ follow up appointments/ Ambulatory Nurse Navigator name and contact information       Non-MG follow up appointment(s): per [facility]  [facility]: call information given to pt      Patient education focused on readmission zones as described as: The Red Zone: High risk for readmission, days 1-21  The Yellow Zone: Moderate  risk for readmission, days 22-29   The Green Zone: Lower risk for readmission, days                30 and after    The [facility] Team will attempt to follow the patient from a distance while inpatient as well as be available for further transition/disposition needs. The [facility] team will continue to offer support during the 30- 90 day discharge from acute care setting. Will notify Ambulatory TOCRN.     Past Medical History:   Diagnosis Date    Abdominal pain     nausea, bloating, frequent indigestion, ulcers    Arthritis     Cancer (HCC)     squamous cell - leg    Chronic pain     COPD     Dyspepsia and other specified disorders of function of stomach     Easy bruising     Hypercholesterolemia     Osteoporosis     Other ill-defined conditions(799.89)     blood transfusion with miscarriage    Other ill-defined conditions(799.89)     gout    Other ill-defined conditions(799.89) 1/13    hematuria, being evaluated by Dr Severo Fujita Thyroid disease

## 2019-05-20 NOTE — PROGRESS NOTES
Marychuy Masters is an 82year-old lady admitted complaining of dyspnea. An incidental finding on chest CT this admission is aneurysm of the ascending aorta (59 mm) and of the descending thoracic aorta 45). We are asked to give an opinion about the aneurysm in the ascending. She has a long history of chronic lung disease. Her pulmonologist has been Nannette Alvarez. She does not use oxygen at home. She has been prescribed a CPAP machine but she does not use it. She says she stopped smoking six days ago. Daughter who accompanies her now says she cannot even walk about house, instead living in her recliner. Today she says she feels cold and is covered heavily to the chin with multiple blankets. She says her Synthroid irritates her stomach, so she has not taken it for a couple of months. TSH this admission is 33. There is no history of MI, angina, CHF. Echo this admission shows preserved LV function and mild/moderate aortic valve insufficiency. Height 4' 7\". Weight 195 lbs. BMI 45. /55. HR 82/min. She is morbidly obese, severely deconditioned. She is not now in respiratory distress. The record shows no PFTs, no ABGs. Impression: Aneurysm of ascending aorta, asymptomatic Aneurysm of descending thoracic aorta, asymptomatic Chronic obstructive lung disease Active smoker Hypothyroid, noncompliant on replacement therapy Morbid obesity Small stature Severe deconditioning Though her aortic aneurysm disease places her at risk for complications and death, she is a prohibitive risk for surgical intervention. Recommend strict blood pressure control along with general medical measures.

## 2019-05-20 NOTE — PROGRESS NOTES
Problem: Falls - Risk of 
Goal: *Absence of Falls Description Document Durenda Yoan Fall Risk and appropriate interventions in the flowsheet. Outcome: Progressing Towards Goal 
  
Problem: Patient Education: Go to Patient Education Activity Goal: Patient/Family Education Outcome: Progressing Towards Goal 
  
Problem: Pressure Injury - Risk of 
Goal: *Prevention of pressure injury Description Document Abe Scale and appropriate interventions in the flowsheet. Outcome: Progressing Towards Goal 
  
Problem: Patient Education: Go to Patient Education Activity Goal: Patient/Family Education Outcome: Progressing Towards Goal 
  
Problem: Patient Education: Go to Patient Education Activity Goal: Patient/Family Education Outcome: Progressing Towards Goal

## 2019-05-20 NOTE — ROUTINE PROCESS
Attempted to schedule PCP ARACELI apt with Dr. Hugo Chavez,  unable to schedule due to Dr. Gordo Degroot schedule being full for the next few months. Was connected to Dr. Gordo Degroot nurse where I left a voicemail for patient to be fit into schedule, currently awaiting return call. [facility] visit scheduled for 5/22/19 at [address] [facility] will contact the patient for additional information and to confirm visit. Pulmonary f/u apt scheduled with Dr. Richar Shrestha on 6/3/19 at 1:15PM at the [place] location.   Apts added to AVS

## 2019-05-20 NOTE — PROGRESS NOTES
1640 
 
Notified by PCT that when getting pt dressed to discharge pt stated that she is unable to ambulate. Pt daughter stated she was concerned for the Pt's safety since pt had to ambulate up stairs to enter home. Both daughter and pt asked if transportation could be provided. I informed them that I would check with CM. Will continue to monitor. [address] Attempted to call CM no answer. Went to [facility] office was informed CM had left for the day. I asked if I could set up transportation and was informed yes. v Will continue to monitor [address] [employer] Transportation set up via [employer] with ETA to be w/in the hr. Will continue to monitor. [address] [facility] Pt d/c home. I reviewed all d/c instructions, follow up appts, and medications. Pt stated understanding. Removed all IV/tele. Pt left with all personal belongings and Rxs via [facility]

## 2019-05-20 NOTE — DISCHARGE SUMMARY
Hospitalist Discharge Summary Patient ID: 
Marina Alberts 
[id number] 
46 y.o. 
1935 5/17/2019 PCP on record: Sara Dean MD 
 
Admit date: 5/17/2019 Discharge date and time: 5/20/2019 DISCHARGE DIAGNOSIS: 
 
Acute hypoxemic respiratory failure, now qualifies for new O2 2 liters at dc Secondary to acute on chronic COPD exacerbation, no CHF Left pleural effusion, will need monitoring and further assessment by pulmonary Severe hypokalemia, present on admission 
hypomag 
hypophos Uncontrolled hypothyroidism, secondary to medical noncompliance History of fluid retention History of constipation 
mild elevation in bilirubin, asymptomatic, normal ultrasound, status post cholecystectomy Minimal troponin elevation, secondary to demand ischemia, no acute coronary syndrome or CHF History of coronary artery disease Ascending thoracic aneurysm 5.9 cm Descending thoracic aneurysm 4.5 cm Noncompliance with taking medications at home, counseled Acute metabolic encephalopathy, resolved Partial code, no intubation but chest compressions and shock and meds okay Severe deconditioning Morbid obesity BMI of 46 CONSULTATIONS: 
IP CONSULT TO HOSPITALIST 
IP CONSULT TO VASCULAR SURGERY 
IP CONSULT TO CARDIOLOGY 
IP CONSULT TO THORACIC SURGERY Excerpted HPI from H&P of Laure Laguna MD: 
CHIEF COMPLAINT: Sob 
  
HISTORY OF PRESENT ILLNESS:    
This is a 82-year-old female with past medical history of COPD, hypertension, hypothyroidism, noncompliance to medications is coming to the hospital with chief complaints of shortness of breath since the last 1 week.  Patient is accompanied by her daughter who is at the bedside.  According to them, patient stopped taking all her medications since the last 2 months due to heartburn.  Patient reports having shortness of breath which is worse with minimal exertion, also has cough and whitish phlegm.  She does not report any chest pain.  She also reports generalized swelling of her legs but that currently is better.  Does not report any palpitations or syncopal episodes.  Denies focal weakness, tingling or numbness. On arrival to the hospital her vital signs are within normal limits but she desaturated to 89% on room air.  On lab work CBC was within normal limits.  BMP showed a potassium of 2.3.  First troponin was 0.07.  She had a 1 view chest x-ray which showed poor visualization of the left lung base. 
  
We were asked to admit for work up and evaluation of the above problems.  
  
 
 
 
______________________________________________________________________ DISCHARGE SUMMARY/HOSPITAL COURSE:  for full details see H&P, daily progress notes, labs, consult notes. Acute hypoxic respiratory failure Suspected acute on chronic COPD exacerbation, r/o CHF 
-Chest x-ray is not clear and mentioned that airspace disease effusion cannot be ruled out. -CTA chest, no PE, L pleural effusion 
-cont empiric DuoNeb, azithromycin and Solu-Medrol 
-Continue oxygen by nasal cannula to maintain saturations around 92% 
-cont Mucinex 
-wean o2, does not have at home 
-flutter valve, sputum cx if able 
-bronhcospasm much improved, change to pred taper for dc. 
-still hypoxic, qualifies for o2, will arrange at dc. 
-f/u with pulm as outpt 
  
Severe hypokalemia 
hypomag 
hypophos 
-Patient has been replaced with KCl, K better from 2.3 to 3.4, 3.4 again today 
-cont po supp 
-on bumex now 
-follow maranda 
-magnesium level 1.8, give some po mag Hypothyroidism, uncontrolled Per daughter, patient has not been taking her levothyroxine since last 2 months -TSH currently is 33.8 
-Resumed home levothyroxine and check thyroid function testing in 4 weeks History of fluid retention per patient 
-No previous diagnosis of congestive heart failure per patient -now on Bumex 
-echo-EF 65%, mild mod AR 
-no CHF 
  
 Constipation 
-Last BM was 4 days ago. No abdominal pain 
- give carmelo colace and lactulose x 1. 
  
Total hyperbilirubinemia 
-lft's otherwise are normal.  
-passive liver congestion?  
-bili 2.7 to 1.3 today, mostly inderect 
-normal us, absent GB 
-follow 
-consider gi eval if needed, suspect gilberts  
  
Mild troponin elevation, ? demand ischemia ? CHF History of coronary artery disease cath in 2010 
-Patient reports recent swelling and weight gain (but noncompliant with meds) -We will check an echocardiogram 
-will ask for cardiology consultation 
-Patient's chest CT done on admission did not show PE but revealed an ascending thoracic aortic aneurysm of 5.9 cm in diameter and a descending thoracic aortic aneurysm of 4.5 cm, no comparisons in the left pleural effusion with lower lobe atelectasis 
-patient was aware of the aneurysm but has never been referred to vascular surgery, perhaps because she is not a candidate for surgery given her age and comorbidities and risk 
-appreciate vasc and CT surgery eval 
-bnp 226  
-change bumex to po home dose. 
  
Noncompliance with medications 
-Daughter reports she stopped her meds about 2 months ago 
  
Acute metabolic encephalopathy 
-this may have been due to hypoxemia on admit, improved we will follow closely 
-Consider imaging if needed 
-She is also significantly hypothyroid due to noncompliance with her medications which may be affecting her mentation as well 
-back to baseline per daughter 
  
  
Code Status: Partial, No Intubation, okay for chest compressions, shock and meds Surrogate Decision Maker: Dtr [place] 
  
Recommended Disposition: Home w/Family and HH PT, OT, RN , new O2 NC 2 liters Patient was discharged home today with home health. Will arrange home O2, since she qualifies. Home health will follow her respiratory symptoms as well as PT and OT to maximize her mobility.   She is essentially recliner bound but may be able to stand slightly and pivot and perhaps can be enhanced as her respiratory status improves. She will go home with 24-hour care with her daughter. She will need to follow-up with her primary care physician in 3 to 5 days. I will recommend follow-up BMP, mag, and Phos since she has been restarted on her home Bumex to make sure her electrolytes remained stable on current supplementation. Labs can be done through home health and when she follows with her primary care physician if it occurs later this week. Patient will need to follow-up with her primary pulmonologist in the next few weeks. She will need a follow-up TSH in 4 weeks to assure that she is on the correct dose now that her Synthroid has been resumed. She had stopped taking it for 2 months. Patient was followed by cardiology during this hospital stay did not recommend any further testing. She did have a left pleural effusion on her CAT scan but otherwise no definitive congestive heart failure. Her echocardiogram showed an EF of 56 to 60% with mild to moderate aortic regurgitation. Patient  was also seen by vascular surgery and cardiothoracic surgery regarding her large thoracic aneurysms. No intervention was recommended at this time but she is at high risk for complications including death but the risk of surgery is prohibitive. They recommended general control including strict blood pressure control and general medical measures. _______________________________________________________________________ Patient seen and examined by me on discharge day. Pertinent Findings: 
Patient feels her breathing is much improved close to baseline no cough or sputum production. Her daughter is at the bedside feels her mental status is now back to her baseline.   She appears to still require oxygen despite significant improvement in her respiratory exam so will see if she requires oxygen at discharge but she would like to go home today. We discussed the importance of compliance with her medications. Patient is awake and alert oriented x3 on nasal cannula no distress no dyspnea. Cardiovascular regular rate lungs today are clear with good air movement no wheezes rhonchi or crackles appreciated. Abdomen is obese bowel sounds present nontender extremities no clubbing cyanosis or edema. 
_______________________________________________________________________ DISCHARGE MEDICATIONS:  
Current Discharge Medication List  
  
START taking these medications Details  
potassium chloride SR (K-TAB) 20 mEq tablet Take 1 Tab by mouth daily for 30 days. Qty: 30 Tab, Refills: 0  
  
levothyroxine (SYNTHROID) 50 mcg tablet Take 1 Tab by mouth every morning for 30 days. Qty: 30 Tab, Refills: 0  
  
magnesium oxide (MAG-OX) 400 mg tablet Take 1 Tab by mouth daily for 7 days. Qty: 7 Tab, Refills: 0 phosphorus (K PHOS NEUTRAL) 250 mg tablet Take 1 Tab by mouth two (2) times a day for 3 days. Qty: 6 Tab, Refills: 0  
  
predniSONE (DELTASONE) 10 mg tablet Take 40 mg by mouth daily (with breakfast) for 1 day, THEN 30 mg daily (with breakfast) for 1 day, THEN 20 mg daily (with breakfast) for 1 day, THEN 10 mg daily (with breakfast) for 1 day. Qty: 10 Tab, Refills: 0  
  
umeclidinium-vilanterol (ANORO ELLIPTA) 62.5-25 mcg/actuation inhaler Take 1 Puff by inhalation daily for 30 days. Qty: 1 Inhaler, Refills: 0  
  
albuterol (ACCUNEB) 0.63 mg/3 mL nebulizer solution 3 mL by Nebulization route every six (6) hours as needed for Wheezing or Shortness of Breath for up to 120 doses. Qty: 120 Vial, Refills: 0 CONTINUE these medications which have CHANGED Details  
aspirin delayed-release 81 mg tablet Take 1 Tab by mouth daily for 30 days. Qty: 30 Tab, Refills: 0  
  
bumetanide (BUMEX) 1 mg tablet Take 0.5 Tabs by mouth two (2) times a day for 30 days. Qty: 30 Tab, Refills: 0 CONTINUE these medications which have NOT CHANGED Details  
aspirin/salicylamide/caffeine (BC HEADACHE POWDER PO) Take 1 Packet by mouth daily. albuterol (PROVENTIL HFA, VENTOLIN HFA, PROAIR HFA) 90 mcg/actuation inhaler Take 2 puffs by inhalation every four (4) hours as needed for Wheezing. Qty: 1 Inhaler, Refills: 0 STOP taking these medications  
  
 potassium chloride SA (MICRO-K) 10 mEq capsule Comments:  
Reason for Stopping: Follow-up Information Follow up With Specialties Details Why Contact Info Heaven Muse MD Family Practice Schedule an appointment as soon as possible for a visit in 2 days  [address] [place] [address] 
[phone] Gib Kehr, MD Endocrinology Schedule an appointment as soon as possible for a visit in 2 days  [address] 
[phone] Heaven Muse MD Family Practice In 3 days  [address] [place] [address] 
[phone] Solo Wilks MD Pulmonary Disease In 2 weeks  [address] [facility] [address] [address] [phone] 
  
  
 
________________________________________________________________ Risk of deterioration: Moderate Condition at Discharge:  Stable 
__________________________________________________________________ Disposition Home with family and home health services 
 
____________________________________________________________________ Code Status: Partial 
___________________________________________________________________ Total time in minutes spent coordinating this discharge (includes going over instructions, follow-up, prescriptions, and preparing report for sign off to her PCP) :  40 minutes Signed: 
Katty Ralph MD

## 2019-05-20 NOTE — PROGRESS NOTES
ARACELI plan: Pt will discharge home today, transported by her daughter in a personal vehicle. Pt will receive HH PT/RN through [facility]. Pt is scheduled to follow-up with her Pulmonologist on 6/3/19. Pt's PCP, Cardiologist, and Endocrinologist offices will call the pt directly to schedule follow-up appointments. Pt will also receive a [facility] visit on 5/23/19. A portable O2 tank was delivered to the pt's bedside and will have a concentrator delivered to her home this evening - serviced through [facility]. No other concerns indicated at this time. Care Management Interventions PCP Verified by CM: Yes Mode of Transport at Discharge: Other (see comment)(daughter) Transition of Care Consult (CM Consult): Home Health([facility]) [address]: Yes MyChart Signup: No 
Discharge Durable Medical Equipment: Yes(home O2 through [facility]) Physical Therapy Consult: Yes Occupational Therapy Consult: No 
Speech Therapy Consult: No 
Current Support Network: Own Home, Lives with Caregiver Confirm Follow Up Transport: Family Plan discussed with Pt/Family/Caregiver: Yes Freedom of Choice Offered: Yes Discharge Location Discharge Placement: Home with home health([facility]) CHARI Dwyer Care Manager [phone]

## 2019-05-20 NOTE — PROGRESS NOTES
Progress Note 5/20/2019 1:06 PM 
NAME: Jared Driver MRN:  [id number] Admit Diagnosis: Shortness of breath [R06.02]; Hypokalemia [E87.6] Assessment:  
 
 
Acute Respiratory failure with hypoxia. Insignificant change in Troponin. COPD Hypothyroid Hypokalemia. No hx of heart disease. Ascending thoracic aortic aneurysm. Evaluated by CT surgery and medical management recommended. At his age, co morbidities , would be very high risk to consider surgical repair. AAA 
  
  
5/19   Cardiac status stable. 5/20 Echo showed normal LV fx. Mild Moderate Aortic insufficiency, likely due to aortic dilatation Plan: No other cardiac studies or treatment needed. []        High complexity decision making was performed Subjective:  
 
Jared Driver denies chest pain, dyspnea. Discussed with RN events overnight. Patient Active Problem List  
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  
 Confusion R41.0  Shortness of breath R06.02 Review of Systems: 
 
Symptom Y/N Comments  Symptom Y/N Comments Fever/Chills N   Chest Pain N Poor Appetite N   Edema N   
Cough N   Abdominal Pain N Sputum N   Joint Pain N   
SOB/MADERA N   Pruritis/Rash N   
Nausea/vomit N   Tolerating PT/OT Y Diarrhea N   Tolerating Diet Y Constipation N   Other Could NOT obtain due to:   
 
Objective:  
  
Physical Exam: 
 
Last 24hrs VS reviewed since prior progress note. Most recent are: 
 
Visit Vitals /55 (BP 1 Location: Left arm, BP Patient Position: Supine; Head of bed elevated (Comment degrees)) Comment (BP Patient Position): 30 degrees Pulse 78 Temp 98.3 °F (36.8 °C) Resp 20 Ht 4' 7\" (1.397 m) Wt 88.4 kg (194 lb 14.4 oz) SpO2 92% Breastfeeding?  No  
 BMI 45.30 kg/m² Intake/Output Summary (Last 24 hours) at 5/20/2019 1306 Last data filed at 5/20/2019 7725 Gross per 24 hour Intake 580 ml Output 800 ml Net -220 ml General Appearance: Well developed, well nourished, alert & oriented x 3,  
 no acute distress. Ears/Nose/Mouth/Throat: Hearing grossly normal. 
Neck: Supple. Chest: Lungs clear to auscultation bilaterally. Cardiovascular: Regular rate and rhythm, S1S2 normal, no murmur. Abdomen: Soft, non-tender, bowel sounds are active. Extremities: No edema bilaterally. Skin: Warm and dry. PMH/SH reviewed - no change compared to H&P Data Review Telemetry: normal sinus rhythm Lab Data Personally Reviewed: 
 
Recent Labs  
  05/20/19 0217 05/19/19 
0134 WBC 11.5* 13.2* HGB 11.1* 11.3* HCT 34.8* 35.3  179 LABRCNT(INR:3,PTP:3,APTT:3,) Recent Labs  
  05/20/19 0217 05/19/19 
0134 05/18/19 
0304  139 145  
K 3.4* 3.4* 3.4*  
 107 109* CO2 30 24 29 BUN 25* 22* 15  
CREA 0.85 1.00 0.93 * 143* 133* CA 7.7* 7.7* 7.8*  
MG 1.9 1.8  --   
LABRCNT(CPK:3,CpKMB:3,ckndx:3,troiq:3) Lab Results Component Value Date/Time Cholesterol, total 143 05/20/2019 02:17 AM  
 HDL Cholesterol 48 05/20/2019 02:17 AM  
 LDL, calculated 80 05/20/2019 02:17 AM  
 Triglyceride 75 05/20/2019 02:17 AM  
 CHOL/HDL Ratio 3.0 05/20/2019 02:17 AM  
LABRCNT(sgot:3,gpt:3,ap:3,tbiL:3,TP:3,ALB:3,GLOB:3,ggt:3,aml:3,amyp:3,lpse:3,hlpse:3)No results for input(s): PH, PCO2, PO2 in the last 72 hours. Lab Results Component Value Date/Time Cholesterol, total 143 05/20/2019 02:17 AM  
 HDL Cholesterol 48 05/20/2019 02:17 AM  
 LDL, calculated 80 05/20/2019 02:17 AM  
 Triglyceride 75 05/20/2019 02:17 AM  
 CHOL/HDL Ratio 3.0 05/20/2019 02:17 AM  
MEDTABLETimlatha Liang MD 
No results for input(s): PH, PCO2, PO2 in the last 72 hours. Medications Personally Reviewed: 
 
Current Facility-Administered Medications Medication Dose Route Frequency  phosphorus (K PHOS NEUTRAL) 250 mg tablet 1 Tab  1 Tab Oral BID  
 azithromycin (ZITHROMAX) tablet 500 mg  500 mg Oral DAILY  predniSONE (DELTASONE) tablet 40 mg  40 mg Oral DAILY WITH BREAKFAST  umeclidinium-vilanterol (ANORO ELLIPTA) 62.5 mcg- 25 mcg/inhalation  1 Puff Inhalation DAILY  magnesium oxide (MAG-OX) tablet 400 mg  400 mg Oral DAILY  bumetanide (BUMEX) injection 0.5 mg  0.5 mg IntraVENous Q12H  aspirin delayed-release tablet 81 mg  81 mg Oral DAILY  potassium chloride SR (KLOR-CON 10) tablet 20 mEq  20 mEq Oral DAILY  sodium chloride (NS) flush 5-40 mL  5-40 mL IntraVENous Q8H  
 sodium chloride (NS) flush 5-40 mL  5-40 mL IntraVENous PRN  
 acetaminophen (TYLENOL) tablet 650 mg  650 mg Oral Q6H PRN  
 ondansetron (ZOFRAN) injection 4 mg  4 mg IntraVENous Q6H PRN  
 heparin (porcine) injection 5,000 Units  5,000 Units SubCUTAneous Q8H  
 albuterol-ipratropium (DUO-NEB) 2.5 MG-0.5 MG/3 ML  3 mL Nebulization Q4HWA RT  
 levothyroxine (SYNTHROID) tablet 50 mcg  50 mcg Oral 6am  
   
 
Hieu Mcpherson MD

## 2019-05-20 NOTE — PROGRESS NOTES
Bedside shift change report given to MIKE Fagan (oncoming nurse). Report included the following information SBAR. SHIFT SUMMARY: 
 
 
CONCERNS TO ADDRESS WITH MD: 
 
 
 
[facility] NURSING NOTE Admission Date 5/17/2019 Admission Diagnosis Shortness of breath [R06.02] Hypokalemia [E87.6] Consults IP CONSULT TO HOSPITALIST 
IP CONSULT TO VASCULAR SURGERY 
IP CONSULT TO CARDIOLOGY 
IP CONSULT TO THORACIC SURGERY Cardiac Monitoring [x] Yes [] No  
  
Purposeful Hourly Rounding [x] Yes   
Aneta Score Total Score: 4 Aneta score 3 or > [x] Bed Alarm [] Avasys [] 1:1 sitter [] Patient refused (Signed refusal form in chart) Abe Score Abe Score: 16 Abe score 14 or < [] PMT consult [] Wound Care consult  
 []  Specialty bed  [] Nutrition consult Influenza Vaccine Received Flu Vaccine for Current Season (usually Sept-March): No  
 Patient/Guardian Refused (Notify MD): Yes Oxygen needs? [] Room air Oxygen @  []1L    [x]2L    []3L   []4L    []5L   []6L via NC Chronic home O2 use? [] Yes [x] No 
Perform O2 challenge test and document in progress note using smartphrase (.Homeoxygen) Last bowel movement Last Bowel Movement Date: 05/17/19 Urinary Catheter External Female Catheter 05/17/19-Urine Output (mL): 200 ml LDAs Peripheral IV 05/18/19 Left Arm (Active) Site Assessment Clean 5/19/2019  3:11 AM  
Phlebitis Assessment 0 5/19/2019  3:11 AM  
Infiltration Assessment 0 5/19/2019  3:11 AM  
Dressing Status Clean, dry, & intact 5/19/2019  3:11 AM  
Dressing Type Transparent 5/19/2019  3:11 AM  
Hub Color/Line Status Blue;Flushed 5/19/2019  3:11 AM  
   
Peripheral IV 05/19/19 Right Antecubital (Active) Site Assessment Clean, dry, & intact 5/19/2019  7:10 PM  
Phlebitis Assessment 0 5/19/2019  7:10 PM  
Infiltration Assessment 0 5/19/2019  7:10 PM  
Dressing Status Clean, dry, & intact 5/19/2019  7:10 PM  
Dressing Type Transparent 5/19/2019  7:10 PM  
      
 External Female Catheter 05/17/19 (Active) Site Assessment Clean, dry, & intact 5/19/2019  3:11 AM  
Repositioned Yes 5/19/2019  3:11 AM  
Perineal Care Yes 5/19/2019  3:11 AM  
Wick Changed No 5/19/2019  3:11 AM  
Suction Canister/Tubing Changed No 5/19/2019  3:11 AM  
Urine Output (mL) 200 ml 5/19/2019  3:11 AM  
            
  
Readmission Risk Assessment Tool Score Low Risk   
      
 10 Total Score   
  
 5 Pt. Coverage (Medicare=5 , Medicaid, or Self-Pay=4) 5 Charlson Comorbidity Score (Age + Comorbid Conditions) Criteria that do not apply:  
 Has Seen PCP in Last 6 Months (Yes=3, No=0) . Living with Significant Other. Assisted Living. LTAC. SNF. or  
Rehab Patient Length of Stay (>5 days = 3) IP Visits Last 12 Months (1-3=4, 4=9, >4=11) Expected Length of Stay 3d 19h Actual Length of Stay 2

## 2019-05-20 NOTE — PROGRESS NOTES
Oral and Written notification given to patient and/or caregiver informing them that they are currently an Outpatient receiving care in our facility. Outpatient services include Observation Services. Asya Jones [phone]

## 2019-05-20 NOTE — DISCHARGE INSTRUCTIONS
Patient Education        Chronic Obstructive Pulmonary Disease (COPD) Flare-Ups: Care Instructions  Your Care Instructions    Chronic obstructive pulmonary disease (COPD) is a lung disease that makes it hard to breathe. It is caused by damage to the lungs over many years, usually from smoking. COPD is often a mix of two diseases:  · Chronic bronchitis: The airways that carry air to the lungs (bronchial tubes) get inflamed and make a lot of mucus. This can narrow or block the airways. · Emphysema: In a healthy person, the tiny air sacs in the lungs are like balloons. As you breathe in and out, they get bigger and smaller to move air through your lungs. But with emphysema, these air sacs are damaged and lose their stretch. Less air gets in and out of the lungs. Many people with COPD have attacks called flare-ups or exacerbations. This is when your usual symptoms quickly get worse and stay worse. The doctor has checked you carefully. But problems can develop later. If you notice any problems or new symptoms, get medical treatment right away. Follow-up care is a key part of your treatment and safety. Be sure to make and go to all appointments, and call your doctor if you are having problems. It's also a good idea to know your test results and keep a list of the medicines you take. How can you care for yourself at home? · Be safe with medicines. Take your medicines exactly as prescribed. Call your doctor if you think you are having a problem with your medicine. You may be taking medicines such as:  ? Bronchodilators. These help open your airways and make breathing easier. ? Corticosteroids. These reduce airway inflammation. They may be given as pills, in a vein, or in an inhaled form. You may go home with pills in addition to an inhaler that you already use. · A spacer may help you get more inhaled medicine to your lungs. Ask your doctor or pharmacist if a spacer is right for you.  If it is, ask how to use it properly. · If your doctor prescribed antibiotics, take them as directed. Do not stop taking them just because you feel better. You need to take the full course of antibiotics. · If your doctor prescribed oxygen, use the flow rate your doctor has recommended. Do not change it without talking to your doctor first.  · Do not smoke. Smoking makes COPD worse. If you need help quitting, talk to your doctor about stop-smoking programs and medicines. These can increase your chances of quitting for good. When should you call for help? Call 911 anytime you think you may need emergency care. For example, call if:    · You have severe trouble breathing.    Call your doctor now or seek immediate medical care if:    · You have new or worse trouble breathing.     · Your coughing or wheezing gets worse.     · You cough up dark brown or bloody mucus (sputum).     · You have a new or higher fever.    Watch closely for changes in your health, and be sure to contact your doctor if:    · You notice more mucus or a change in the color of your mucus.     · You need to use your antibiotic or steroid pills.     · You do not get better as expected. Where can you learn more? Go to http://ruddy-anjali.info/. Enter K058 in the search box to learn more about \"Chronic Obstructive Pulmonary Disease (COPD) Flare-Ups: Care Instructions. \"  Current as of: September 5, 2018  Content Version: 11.9  © 9604-0686 [employer]. Care instructions adapted under license by [employer] (which disclaims liability or warranty for this information). If you have questions about a medical condition or this instruction, always ask your healthcare professional. [place] [address] any warranty or liability for your use of this information. Patient Education        Hypokalemia: Care Instructions  Your Care Instructions    Hypokalemia (say \"tf-fb-uhw-BRENDAN-norberto-uh\") is a low level of potassium.  The heart, muscles, kidneys, and nervous system all need potassium to work well. This problem has many different causes. Kidney problems, diet, and medicines like diuretics and laxatives can cause it. So can vomiting or diarrhea. In some cases, cancer is the cause. Your doctor may do tests to find the cause of your low potassium levels. You may need medicines to bring your potassium levels back to normal. You may also need regular blood tests to check your potassium. If you have very low potassium, you may need intravenous (IV) medicines. You also may need tests to check the electrical activity of your heart. Heart problems caused by low potassium levels can be very serious. Follow-up care is a key part of your treatment and safety. Be sure to make and go to all appointments, and call your doctor if you are having problems. It's also a good idea to know your test results and keep a list of the medicines you take. How can you care for yourself at home? · If your doctor recommends it, eat foods that have a lot of potassium. These include fresh fruits, juices, and vegetables. They also include nuts, beans, and milk. · Be safe with medicines. If your doctor prescribes medicines or potassium supplements, take them exactly as directed. Call your doctor if you have any problems with your medicines. · Get your potassium levels tested as often as your doctor tells you. When should you call for help? Call 911 anytime you think you may need emergency care. For example, call if:    · You feel like your heart is missing beats.  Heart problems caused by low potassium can cause death.     · You passed out (lost consciousness).     · You have a seizure.    Call your doctor now or seek immediate medical care if:    · You feel weak or unusually tired.     · You have severe arm or leg cramps.     · You have tingling or numbness.     · You feel sick to your stomach, or you vomit.     · You have belly cramps.     · You feel bloated or constipated.     · You have to urinate a lot.     · You feel very thirsty most of the time.     · You are dizzy or lightheaded, or you feel like you may faint.     · You feel depressed, or you lose touch with reality.    Watch closely for changes in your health, and be sure to contact your doctor if:    · You do not get better as expected. Where can you learn more? Go to http://ruddy-anjali.info/. Enter G358 in the search box to learn more about \"Hypokalemia: Care Instructions. \"  Current as of: March 14, 2018  Content Version: 11.9  © 9938-5538 [employer]. Care instructions adapted under license by [employer] (which disclaims liability or warranty for this information). If you have questions about a medical condition or this instruction, always ask your healthcare professional. [address] any warranty or liability for your use of this information. Patient Education        Hypothyroidism: Care Instructions  Your Care Instructions    You have hypothyroidism, which means that your body is not making enough thyroid hormone. This hormone helps your body use energy. If your thyroid level is low, you may feel tired, be constipated, have an increase in your blood pressure, or have dry skin or memory problems. You may also get cold easily, even when it is warm. Women with low thyroid levels may have heavy menstrual periods. A blood test to find your thyroid-stimulating hormone (TSH) level is used to check for hypothyroidism. A high TSH level may mean that you have low thyroid. When your body is not making enough thyroid hormone, TSH levels rise in an effort to make the body produce more. The treatment for hypothyroidism is to take thyroid hormone pills. You should start to feel better in 1 to 2 weeks. But it can take several months to see changes in the TSH level.  You will need regular visits with your doctor to make sure you have the right dose of medicine. Most people need treatment for the rest of their lives. You will need to see your doctor regularly to have blood tests and to make sure you are doing well. Follow-up care is a key part of your treatment and safety. Be sure to make and go to all appointments, and call your doctor if you are having problems. It's also a good idea to know your test results and keep a list of the medicines you take. How can you care for yourself at home? · Take your thyroid hormone medicine exactly as prescribed. Call your doctor if you think you are having a problem with your medicine. Most people do not have side effects if they take the right amount of medicine regularly. ? Take the medicine 30 minutes before breakfast, and do not take it with calcium, vitamins, or iron. ? Do not take extra doses of your thyroid medicine. It will not help you get better any faster, and it may cause side effects. ? If you forget to take a dose, do NOT take a double dose of medicine. Take your usual dose the next day. · Tell your doctor about all prescription, herbal, or over-the-counter products you take. · Take care of yourself. Eat a healthy diet, get enough sleep, and get regular exercise. When should you call for help? Call 911 anytime you think you may need emergency care. For example, call if:    · You passed out (lost consciousness).     · You have severe trouble breathing.     · You have a very slow heartbeat (less than 60 beats a minute).     · You have a low body temperature (95°F or below).    Call your doctor now or seek immediate medical care if:    · You feel tired, sluggish, or weak.     · You have trouble remembering things or concentrating.     · You do not begin to feel better 2 weeks after starting your medicine.    Watch closely for changes in your health, and be sure to contact your doctor if you have any problems. Where can you learn more? Go to http://ruddy-anjali.info/.   Enter M164 in the search box to learn more about \"Hypothyroidism: Care Instructions. \"  Current as of: March 14, 2018  Content Version: 11.9  © 2367-2060 [employer]. Care instructions adapted under license by [employer] (which disclaims liability or warranty for this information). If you have questions about a medical condition or this instruction, always ask your healthcare professional. [address] any warranty or liability for your use of this information. Patient Education        Shortness of Breath: Care Instructions  Your Care Instructions  Shortness of breath has many causes. Sometimes conditions such as anxiety can lead to shortness of breath. Some people get mild shortness of breath when they exercise. Trouble breathing also can be a symptom of a serious problem, such as asthma, lung disease, emphysema, heart problems, and pneumonia. If your shortness of breath continues, you may need tests and treatment. Watch for any changes in your breathing and other symptoms. Follow-up care is a key part of your treatment and safety. Be sure to make and go to all appointments, and call your doctor if you are having problems. It's also a good idea to know your test results and keep a list of the medicines you take. How can you care for yourself at home? · Do not smoke or allow others to smoke around you. If you need help quitting, talk to your doctor about stop-smoking programs and medicines. These can increase your chances of quitting for good. · Get plenty of rest and sleep. · Take your medicines exactly as prescribed. Call your doctor if you think you are having a problem with your medicine. · Find healthy ways to deal with stress. ? Exercise daily. ? Get plenty of sleep. ? Eat regularly and well. When should you call for help? Call 911 anytime you think you may need emergency care.  For example, call if:    · You have severe shortness of breath.     · You have symptoms of a heart attack. These may include:  ? Chest pain or pressure, or a strange feeling in the chest.  ? Sweating. ? Shortness of breath. ? Nausea or vomiting. ? Pain, pressure, or a strange feeling in the back, neck, jaw, or upper belly or in one or both shoulders or arms. ? Lightheadedness or sudden weakness. ? A fast or irregular heartbeat. After you call 911, the  may tell you to chew 1 adult-strength or 2 to 4 low-dose aspirin. Wait for an ambulance. Do not try to drive yourself.    Call your doctor now or seek immediate medical care if:    · Your shortness of breath gets worse or you start to wheeze. Wheezing is a high-pitched sound when you breathe.     · You wake up at night out of breath or have to prop your head up on several pillows to breathe.     · You are short of breath after only light activity or while at rest.    Watch closely for changes in your health, and be sure to contact your doctor if:    · You do not get better over the next 1 to 2 days. Where can you learn more? Go to http://ruddy-anjali.info/. Enter S780 in the search box to learn more about \"Shortness of Breath: Care Instructions. \"  Current as of: 2018  Content Version: 11.9  © 3280-0621 [employer]. Care instructions adapted under license by [employer] (which disclaims liability or warranty for this information). If you have questions about a medical condition or this instruction, always ask your healthcare professional. [place] [address] any warranty or liability for your use of this information. HOSPITALIST DISCHARGE INSTRUCTIONS    NAME: Yin Bowles   :  1935   MRN:  [id number]     Date/Time:  2019 11:47 AM    ADMIT DATE: 2019   DISCHARGE DATE: 2019         · It is important that you take the medication exactly as they are prescribed.    · Keep your medication in the bottles provided by the pharmacist and keep a list of the medication names, dosages, and times to be taken in your wallet. · Do not take other medications without consulting your doctor. What to do at Home    Recommended diet:  Cardiac Diet    Recommended activity: Activity as tolerated and PT/OT per Home Health      If you have questions regarding the hospital related prescriptions or hospital related issues please call [facility] at [phone]. You can always direct your questions to your primary care doctor if you are unable to reach your hospital physician; your PCP works as an extension of your hospital doctor just like your hospital doctor is an extension of your PCP for your time at the hospital [facility])    If you experience any of the following symptoms then please call your primary care physician or return to the emergency room if you cannot get hold of your doctor:    Fever, chills, nausea, vomiting, or persistent diarrhea  Worsening weakness or new problems with your speech or balance  Dark stools or visible blood in your stools  New Leg swelling or shortness of breath as these could be signs of a clot    Additional Instructions:      Bring these papers with you to your follow up appointments. The papers will help your doctors be sure to continue the care plan from the hospital.      F/u with pcp in 3-5 days  Will need labs with home health or pcp on Thursday bmp,mag,phos  F/u with pulm in 2 weeks  Call MD if increased shortness of breath, fever or chills  Home oxygen 2 liters        Information obtained by :  I understand that if any problems occur once I am at home I am to contact my physician. I understand and acknowledge receipt of the instructions indicated above.                                                                                                                                            Physician's or R.N.'s Signature Date/Time                                                                                                                                              Patient or Representative Signature

## 2019-05-20 NOTE — PROGRESS NOTES
Home Oxygen Test 
Date of test: 5/20/19 Time of test: 1200 Sa02 86 % on room air AT REST. Sa02 86 % on room air DURING AMBULATION. Sa02 90 % on 2 Liters DURING AMBULATION. Sa02 93 % on 2 Liters AT REST/AFTER AMBULATION.

## 2019-05-20 NOTE — PROGRESS NOTES
ADULT PROTOCOL: JET AEROSOL  REASSESSMENT Patient  Jordan Pompa     82 y.o.   female     5/20/2019  10:32 AM 
 
Breath Sounds Pre Procedure: Right Breath Sounds: Expiratory wheezing Left Breath Sounds: Expiratory wheezing Breath Sounds Post Procedure: Right Breath Sounds: Diminished Left Breath Sounds: Diminished Breathing pattern: Pre procedure Breathing Pattern: Regular Post procedure Breathing Pattern: Regular Heart Rate: Pre procedure Pulse: 74 Post procedure Pulse: 84 Resp Rate: Pre procedure Respirations: 20 
         Post procedure Respirations: 20 
 
Oxygen: O2 Device: Nasal cannula 2L SpO2: Pre procedure SpO2: 92 % Post procedure SpO2: 93 % Nebulizer Therapy: Current medications Aerosolized Medications: DuoNeb Changed: no. 
 
Problem List:  
Patient Active Problem List  
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  
 Confusion R41.0  Shortness of breath R06.02 Respiratory Therapist: Ashanti Mario

## 2019-06-07 NOTE — TELEPHONE ENCOUNTER
Home Based Primary Care & Supportive Services   (previously: At Home)  [phone] 4101 [facility], [address] [facility], [address]    Name:  Ema Weems  YOB: 1935    Received HBPC referral from [facility] nurse. Nurse called and spoke with pt's daughter and caregiver [place]. Chart reviewed. Pt approved for HBPC program.  Full note to follow.       Gwyn Goodpasture, RN  Referral Navigator, Home Based [facility] Care [facility] Supportive [facility]

## 2019-06-12 NOTE — TELEPHONE ENCOUNTER
[facility] Based Primary Care [facility]ive [facility]  Phone:  [phone]     Fax:  [phone] [address], [address] 1935    We received a HBPC & SS referral for Renny Stevens from Sebastián Moore with [facility]. Referral received by:  ____ Epic   ____ Fax   _x___ Phone    ____ Other  We appreciate this referral.    The patient's case has been reviewed and   _x___  Meets /   ____ Does Not Meet the following criteria for an initial provider visit:    __x__  Patient's residence is within 20 miles of [address] [place] [address] office address listed above  (16.5 miles)  __x__  Patient is non-ambulatory (bedbound or wheelchair bound without ability to self-propel)  Pivots from bed to chair with assist  __x__  Patient's residence is determined to be a safe environment for the health care team        (if applicable):  N/A  __x__  Patient has chronic care management health care needs    This patient also ____  Meets /  __x__ Does Not Meet a priority referral for:    ____  Home Health integration  ____  Post-Discharge follow up  ____  High risk health care needs     A letter will be mailed to patient's current PCP after the first HBPC visit to inform him of the transition of care to the [address] [place] [address] team.    PATIENT DEMOGRAPHICS:    Renny Stevens  [address] [place]  [phone]  1935    PRIMARY CARE PROVIDER:  Name:  Anil Wong MD  Phone Number:  [phone]  Address: [address] [place] [address]    REFERRAL SOURCE CONTACT INFORMATION:  Name: [facility] RN  Phone Number: [phone]    DIAGNOSIS:   COPD (on home O2), hypokalemia, CAD, Ascending thoracic aneurysm 5.9 cm,Descending thoracic aneurysm 4.5 cm (pt has not been referred to vascular surgery, per chart, pt is not a candidate for surgery given her age, comorbidities, and risk),  uncontrolled hypothyroidism (secondary to not taking meds), history of fluid retention, obesity    PRIMARY CARETAKER:     Name: [place]  Phone Number: Home:  [phone], cell  [phone]  Address: [address] [place] [address], [address]  Relationship to Patient: daughter    EMERGENCY CONTACT  (if different from Primary Caretaker):  same    ACP:    No AMD on file  Primary Healthcare Decision Makers:   [place] ([phone], Michelle Gillespie, Chema Fan  Relationship:  children    THE FOLLOWING QUESTIONS WERE ANSWERED BY:  Daughter Jc [address]:       One story home, 4 stairs to enter    ADL's:      Medication Management:  dtr administers medications  Transportation:  vehicle  Hygiene:  Requires complete assistance with bathing and dressing  Mobility:    Ambulates short distances with walker with assistance  Falls within the past 6 months? No    DME:  Home oxygen ([employer]), walker, wheelchair, cane, bedside commode    HOME SERVICES:   [address] [place] [address]:      Regular diet      SKIN:    Intact        TOILETING:    Incontinent of urine, uses pads; uses BSC for BM's       WHAT WAS PATIENT'S FUNCTIONAL STATUS 6 MONTHS AGO? Pt could pivot from bed to [facility] independently    [facility] MD OFFICE? Unsteady gait, requires assistance for transfers, pt becomes SOB and sats drop when pt stands    DATE OF MOST RECENT CONTACT WITH A PROVIDER:  Yojana Estrada visit    DATE OF MOST RECENT PCP OFFICE VISIT:  unknown    DATE OF MOST RECENT SPECIALIST VISIT/UPCOMING SPECIALIST APPTS:  Dr. Janis [facility] ([facility]),  Dr. Luis Goel (endocrinology) appt 6/24/19    RECENT ED VISITS AND HOSPITAL ADMISSIONS:     [facility]  5/17/19-5/20/19  (COPD exacerbation)    NOTES:  Per hospitalist note, pt had stopped taking her meds 2 months prior to the most recent hospitalization.     Nurse informed daughter [place] that [facility] providers may make scheduled home visits between the hours of 8:30am and 5:00pm. Individual appt time requests (such as no morning appts) are not usually able to be honored due to the fact that visits are scheduled to optimize the travel route of the provider. New patient provider visits are usually scheduled for Mondays. A  usually schedules a visit prior to the first provider visit to for signing of paperwork and review of Emergency Action Plan booklet. Daughter voices understanding. Daughter was advised that if there are pets in the home to please put them in a separate room prior to [facility] visits (She states they have no pets), and if there are firearms in the home to make sure they are in a locked safe or locked container (she says there are no firearms in the home).      ANNA MARIE Benito, RN-BC  Referral [address] [facility]  Phone:  [phone]  Fax:  [phone]  [email or web address]

## 2019-06-14 NOTE — PROGRESS NOTES
Home Based Primary Care   [phone]  Initial Social Work Assessment    Date and Time of Initial In-Home Visit: 2019 12:15-2:00pm    Reason for Assessment:  HB intake paperwork completion, initial in-home SW assessment    Sources of Information: Pt, pt's dtr [employer]    SOCIAL HISTORY  Relationship Status:   [] Single  []   [] Significant Other  []   [x] /; spouse    [] Other:     Living Circumstances: Pt lives at home with daughter, [place], and son who moved in 9 months ago    Current/Type of Housing:  [] Public/subsidized housing  [] Apartment, Is there an elevator:   [] Assisted Living  [x] [employer], How many floors:  1    FINANCIAL/INSURANCE  Income per month: $3,604.60  Source of Income:    [x] Social Security: $1625/mo   [] SSI   [] Pension   [] Employment Income   [] Spouse income   [] DFAS $621/mo, OPMI $1358.60/mo    Insurance Information:   [x] Medicare   [] Medicaid   [] [employer]   [x] Other:     Are you having trouble paying for things like rent, food, medications? Not at this time    Is there an agency or person who pays your bills? If yes, who? Pt's son is primary financial POA, dtr [place] is secondary financial POA. [place] said she normally manages the finances. ENVIRONMENT CHECKLIST  Food Security: Dtr, granddtr do grocery shopping and meal preparation    Problem with bed bugs, odors, pests, or pets? No. 1 friendly dog in the home. Working smoke alarm? [x] Yes [] No    Difficulty getting to exits from the home? Yes, at this time. DEBBIE Niñoey is coming to built a ramp from the side door to the driveway soon. Firearm Assessment:   Are there firearms in the home? [x] Yes, Where are they kept?  Locked in gun safe, guns belong to pt's son who moved in to the home 9 months ago  [] No      SOCIAL SUPPORT ENVIRONMENT  Support System: Family, [employer] community [facility])    How often do social supports visit? Pt lives dtr and son, pt has another dtr who lives 8 blocks away, a granddtr who lives very close as well    How do you socialize? When people come to visit or engage with her    Are you involved with any community agencies? Name/Contact: Not at this time    Is or has Adult Protective Services ever been involved? No    In the last 6 months, have you seen a ? Psychiatrist? If yes, they be contacted by [employer] team? No    Substance Use:   Tobacco? [x] Yes; pt smokes 3 cigarettes per day at most [] No    Alcohol? [] Yes, How many drinks per week: [x] No   Drugs? [] Yes, which drugs:   [x] No    Do you have an Emergency Call Device system? [] Yes, Which brand? [x] No, Are you interesting in obtaining and subscribing to an Emergency Call Device system? Not at this time. Pt is only left home alone for brief periods of time if dtr has to run to the store, but she typically has her dtr (pt's granddtr) to shopping so pt is not left home alone. COGNITIVE/EMOTIONAL   Mental Status: Alert and oriented    How is your memory? Good memory    In the last 6 months, has anyone told you that you are forgetful? No    Do you receive help with ADLs? [x] Yes, Who helps you? How many hours/days? Dtr provide assistance with ADLs  [] No, Do you need help? TRANSPORTATION NEEDS ASSESSMENT  Can you use public transportation? [] Yes [x] No  Do you use transportation services provided by: Managed Care Organization? Community Service Resource? No    EMERGENCY ACTION PLAN  KEN reviewed the Emergency Action Plan with pt and/or family during this initial in-home Social Work assessment. KEN completed Emergency Numbers, reviewed the content areas of Power Loss, Natural Disasters, Clinical Emergencies, Severe Weather, Safety in the Home, and Infection Control. Patient's acuity value consider to be MEDIUM.       ADVANCED CARE PLANNING  Primary Decision Maker (Health Care Agent): Joaquín Close  Relationship to patient: Daughter  Phone number: [phone]  [] Named in a scanned document   [] Legal Next of Kin  [] Guardian  [x] Other: Pt completed Advance Directive for Healthcare with SW today, needs to be scanned into medical record    Secondary Decision Maker ([address]): Nicho Duran  Relationship to patient: Son  Phone number: [phone]  [] Named in a scanned document   [] Legal Next of Kin  [] Guardian  [x] Pt completed Advance Directive for Healthcare with SW today, needs to be scanned into medical record    Narrative:  SW visited with pt and family in the home. New patient assessment of psychosocial needs and social determinants of health completed. SW introduced Home Based Primary Care and Supportive Services and reviewed Emergency Action Plan booklet. [facility] intake paperwork completed. Pt lives in the home with her daughter and primary caregiver, [place], and her son, Kermit Booker, who moved in 9 months ago. Son works full time outside the home. Pt has another daughter who lives 8 blocks away. Pt had a son who  this past , very suddenly. [place] became tearful when talking about the loss of her brother. She informed that he was having back pain so he was going to physical therapy to help with the pain, but later found out it was actually metastatic cancer and he  3 days after his diagnosis. It was a tragic event for the family, including pt who is also grieving. Pt has 7 grandchildren and 4 great-grandchildren, with another due in September. Family is close and cohesive, per dtr. Pt belongs to [facility], although she has not been able to attend [employer] services in quite some time. [employer] is going to build pt a ramp from her side door to the driveway. Pt did not have advanced care planning on file. She said she had given [facility] her Living Will, but turns out it was in the 1980s that she gave these documents to the healthcare system.  Joselyn Fiore encouraged pt to update her healthcare wishes. She said she would like to do this today. SW assisted pt in completing Advance Directive for Healthcare. Her wishes were clear for no aggressive measures, wishes to die a natural death. Dtr, [place], was named as primary agent, son, Jolly Hudson, was named as secondary agent. SW provided emotional support, validation, and encouragement to pt today. Dtr and pt did not express any acute psychosocial concerns, questions, or needs for immediate follow-up today. Plan:   [x] Establish therapeutic relationship through in home visits and telephonic touch points  [] Assist in optimizing levels of psychosocial function  [] Assess safety concerns and address as appropriate  [] Assess for caregiver burden and intervene as psychosocially indicated  [] Assess patient for caregiver needs to optimize psychosocial function and safety  [] Provide information on available community resources  [] Initiate conversation regarding health care wishes   [x] Assess current Advance Care Planning documents and make ACP recommendations as appropriate  [x] Discuss goals of care and treatment preferences  [] Screen for mental health conditions including but not limited to anxiety, depression, and anticipatory grief  [] Offer counseling services for mental health conditions including but not limited to anxiety, depression, and anticipatory grief  [] Engage family in patient health care goals to ensure support for those goals and minimize patient/family conflict  [] Assess cultural needs of patient/family, educate interdisciplinary team and engage appropriate resources  [x] Other: SW will follow up 1x/monthly, and will remain available for psychosocial support and resources as needed.      CHARI Marte, LCSW    Home Based Primary [facility]  O: [phone]  C: [phone]

## 2019-06-17 PROBLEM — E03.9 ACQUIRED HYPOTHYROIDISM: Status: ACTIVE | Noted: 2019-01-01

## 2019-06-17 PROBLEM — F17.200 CURRENT SMOKER: Status: ACTIVE | Noted: 2019-01-01

## 2019-06-17 PROBLEM — J43.8 OTHER EMPHYSEMA (HCC): Status: ACTIVE | Noted: 2019-01-01

## 2019-06-17 NOTE — PROGRESS NOTES
Home Based [facility]  
[phone] Date of Current Visit: 06/17/19 Date of Initial HB Visit: 6/17/19 SUMMARY:  
Jolanta Jay is a 82 y.o. who was referred by [facility]. They have the following medical problems: COPD (on home O2), hypokalemia, CAD, ascending thoracic aneurysm 5.9 cm, descending thoracic aneurysm 4.5 cm (pt has not been referred to vascular surgery, per chart, pt is not a candidate for surgery given her age, comorbidities, and risk),  uncontrolled hypothyroidism (secondary to not taking meds), history of fluid retention, obesity.  
  
It is a significant taxing effort to leave the home because: unsteady gait/ weak legs, difficult breathing which limits any activity, stays in recliner at all times including overnight, no ramp, has a hard time just walking 4-6 feet with walker, cannot even stand for long / several minutes. I have reviewed the RN Referral Intake Note Irene Estrada) which includes critical information regarding the patient's health, caregiving and social determinants. GOALS OF CARE / TREATMENT PREFERENCES:  
GOALS OF CARE: 
Patient / health care proxy stated goals: See Patient Instructions for Health Care Goal; Updated each visit TREATMENT PREFERENCES:  
Code Status:  [] Attempt Resuscitation       [x] Do Not Attempt Resuscitation Advance Care Planning: 
  Primary Decision Maker: Vahid Mejía - Daughter - [phone] Secondary Decision Maker: Juany Mcdaniels - Son - [phone] Advance Care Planning 6/17/2019 Patient's Healthcare Decision Maker is: Named in scanned ACP document Confirm Advance Directive Yes, on file Does the patient have other document types Do Not Resuscitate DIAGNOSES:  
 
  ICD-10-CM ICD-9-CM 1. Chronic obstructive pulmonary disease, unspecified COPD type ([facility] [address] J44.9 496 DO NOT RESUSCITATE ADVANCE CARE PLANNING FIRST 30 MINS 2. Advance care planning Z71.89 V65.49 ADVANCE CARE PLANNING FIRST 30 MINS 3. Aortic aneurysm without rupture, unspecified portion of aorta (HCC) I71.9 441.9 4. Acquired hypothyroidism E03.9 244.9 5. Onychomycosis B35.1 110.1 6. Hypokalemia E87.6 276.8 7. Tendonitis of shoulder, right M75.81 726.10 PLAN:  
Patient Instructions Dear Rukhsana Banda , It was a pleasure seeing you at home today with [facility] ([phone]) Someone from the Home Based Primary Care Team will see you again in 2 weeks / Mi Shah Your stated goal: To walk well again but my breathing limits me This is what we talked about (your plan): 1. Breathing 
-Your breathing is your main limitation 
-You are on 2L but you have to increase this when you walk 
-You have a hard time walking with the walker just a few feet 
-The Therapist makes you do it though 
-Standing sometimes it hard as well 
-You stay in your reclining all the time, even at night; you do not use the bed that your family bought for you that raise the head and feet  
-Your family also bought you a lift chair but you don't like that either 
-You only use the Albuterol Inhaler but not the others 
-I suggested a spacer for you 
-You use the Albuterol Neb once a day 
-You still smoke occasionally and we talked very clearly about safety when smoking and oxygen use 
-You use the Bumex but not daily, rather every other day; you do not appear fluid overloaded at this time 2. Right shoulder pain 
-You have been having right shoulder pain making it difficult to lift or extend the right arm 
-This is new 
-You just finished working with the Physical Therapist but would like to have them back for an assessment and exercises 
-I recommend Tylenol 650mg tid for several days 
-As we discussed, avoid BC Powders (you are already on Aspirin) 3.  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 You would like an eye assessment as well I provided names for a foot and eye doctor but we don't \"recommend\" them in particular; they are names we have that may perform services in the home 5. You will not take a flu shot or pneumonia shot; our NP will perform  Medicare Wellness Visit soon 6. You are do for bloodwork since follow up from your hospitalization; especially your thyroid which was not in control in the hospital; you state you are taking your medication and hopefully this is better now. We will also check your potassium which runs low as well. Lab Results Component Value Date/Time TSH 33.80 (H) 05/17/2019 03:44 AM  
 
Lab Results Component Value Date/Time Sodium 141 05/20/2019 02:17 AM  
 Potassium 3.4 (L) 05/20/2019 02:17 AM  
 Chloride 106 05/20/2019 02:17 AM  
 CO2 30 05/20/2019 02:17 AM  
 Anion gap 5 05/20/2019 02:17 AM  
 Glucose 138 (H) 05/20/2019 02:17 AM  
 BUN 25 (H) 05/20/2019 02:17 AM  
 Creatinine 0.85 05/20/2019 02:17 AM  
 BUN/Creatinine ratio 29 (H) 05/20/2019 02:17 AM  
 GFR est AA >60 05/20/2019 02:17 AM  
 GFR est non-AA >60 05/20/2019 02:17 AM  
 Calcium 7.7 (L) 05/20/2019 02:17 AM  
 
7. We refilled several of your medications today as well Health Maintenance Health Maintenance Due Topic Date Due  
 DTaP/Tdap/Td series (1 - Tdap) 04/13/1958  Shingrix Vaccine Age 50> (1 of 2) 04/13/1987  GLAUCOMA SCREENING Q2Y  04/13/2002  Bone Densitometry (Dexa) Screening  04/13/2002  Pneumococcal 65+ years (1 of 2 - PCV13) 04/13/2002  MEDICARE YEARLY EXAM  03/14/2018 Advance Care Planning This is what you have shared with us about Advance Care Planning Primary Decision Maker: Maddi Aguillon - Daughter - [phone] Secondary Decision Maker: Finesse Fernandez - Son - [phone] We completed the Advance Directive which shares your wishes and a POST document today with a Durable DNR within in and noting your desire for Limited Medical Interventions which means no life support / no feeding tube. I will copy this document and we will return this to you to have in the home. We also discussed arranging a non urgent meeting with Hospice to learn more about their services during a time where it is not urgent; I recommend this with my patients so they will know they will have access the support when they need it most. 
 
If there are any concerns before that time, such as medication questions, worsening symptoms or a need to see a physician for an urgent or emergent situation; please call [phone]. A physician is also on call after our normal business hours of 8am to 5pm.  
 
In order to serve you better, please allow up to 48 hours for prescription refills to be processed. Certain medications may require more paperwork or a written prescription that you may need to  from the office. We appreciate you letting us know of any refill requests as soon as possible. Sincerely, Radha Saba MD and The Home Based Primary Care Team 
 
 
Advance Care Planning (ACP) Provider [facility] Date of ACP Conversation: 06/17/19 Persons included in Conversation:  patient and POA Length of ACP Conversation in minutes:  20 minutes Authorized Decision Maker (if patient is incapable of making informed decisions): This person is:  
Healthcare Agent/Medical Power of  under Advance Directive Primary Decision Maker: Shira Govea - Daughter - [phone] Secondary Decision Maker: Vicky Dumont - Son For Patients with Decision Making Capacity:  
Values/Goals: Exploration of values, goals, and preferences if recovery is not expected, even with continued medical treatment in the event of:  Imminent death Conversation Outcomes / Follow-Up Plan:  
Completed POLST/POST/MOLST/MOST form Wishes: Durable DNR within POST Limited Medical Interventions; NO LIFE SUPPORT No Feeding Tube PRESCRIPTIONS GIVEN:  
No orders of the defined types were placed in this encounter. HISTORY:  
  
CHIEF COMPLAINT:   
Chief Complaint Patient presents with  COPD  New Patient HPI/SUBJECTIVE: The patient is: [x] Verbal / [] Nonverbal  
 
Discussed Breathing and Function primarily Also still grieving recent loss of adult son from metastatic cancer Lost  3 years ago A bit estranged from Lenore from [facility] due to feeling of lack of support during this critical time in her life REVIEW OF SYSTEMS:  
 
The following systems were [x] reviewed / [] unable to be reviewed Systems: constitutional, ears/nose/mouth/throat, respiratory, gastrointestinal, genitourinary, musculoskeletal, integumentary, neurologic, psychiatric, endocrine. Positive findings noted below: mild constipation FUNCTIONAL ASSESSMENT:  
 
Palliative Performance Scale (PPS): 50 PSYCHOSOCIAL/SPIRITUAL SCREENING:  
  
Any spiritual / Rastafari concerns: [] Yes /  [] No  
 
Caregiver Burnout: [] Yes /  [] No /  [] No Caregiver Present PHYSICAL EXAM:  
 
Wt Readings from Last 3 Encounters:  
05/20/19 194 lb 14.4 oz (88.4 kg) 10/02/14 166 lb 10.7 oz (75.6 kg) 04/14/14 157 lb (71.2 kg) Blood pressure 115/84, pulse 86, SpO2 95 %. Last bowel movement: Saturday / 4 days ago; takes [employer] [place] Constitutional: alert, oriented, able to provide history, in recliner, oxygen on at 2L Eyes: pupils equal, anicteric ENMT: no nasal discharge, moist mucous membranes Cardiovascular: regular rhythm, distal pulses intact Respiratory: breathing not labored, symmetric, CTA bilat. Gastrointestinal: +bowel sounds,  soft non-tender, non-distended Musculoskeletal: no deformity, no tenderness to palpation Skin: warm, dry Neurologic: A/Ox 3, following commands, moving all extremities equally Psychiatric: full affect, no hallucinations Other: 
 
 HISTORY:  
 
Past Medical and Surgical History reviewed in [facility] on date of initial visit Patient Active Problem List  
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  
 Acquired hypothyroidism E03.9  Other emphysema ([address] J43.8  Current smoker F17.200 Family History Problem Relation Age of Onset  Heart Disease Mother  Cancer Father   
     lung  Diabetes Father  Diabetes Son History reviewed, no pertinent family history. Social History Tobacco Use  Smoking status: Current Every Day Smoker Packs/day: 0.50 Years: 65.00 Pack years: 32.50  Smokeless tobacco: Never Used Substance Use Topics  Alcohol use: No  
 
Allergies Allergen Reactions  Ciprofloxacin Other (comments) \"high feeling\"  Codeine Other (comments)  
  hallucinations  Cortisone Other (comments) Suicidal thoughts  Nsaids (Non-Steroidal Anti-Inflammatory Drug) Other (comments) Ulcer history  Other Medication Palpitations Iodine dye  Oxycodone Other (comments) Makes pt feel \"high\"  Penicillins Other (comments) Weak feeling  Vytorin 10-10 [Ezetimibe-Simvastatin] Other (comments) Abdominal swelling  Zantac [Ranitidine Hcl] Other (comments) Blisters Current Outpatient Medications Medication Sig  
 albuterol (PROVENTIL HFA, VENTOLIN HFA, PROAIR HFA) 90 mcg/actuation inhaler Take 2 Puffs by inhalation every four (4) hours as needed for Wheezing.  aspirin delayed-release 81 mg tablet Take 1 Tab by mouth daily for 30 days.  potassium chloride SR (K-TAB) 20 mEq tablet Take 1 Tab by mouth two (2) times a day.  bumetanide (BUMEX) 1 mg tablet Take 0.5 Tabs by mouth two (2) times a day for 30 doses.  acetaminophen (TYLENOL) 325 mg tablet Take 2 Tabs by mouth three (3) times daily as needed for Pain.  ketoconazole (NIZORAL) 2 % topical cream Apply  to affected area two (2) times a day.  alclometasone (ACLOVATE) 0.05 % topical cream Apply  to affected area two (2) times a day. apply thin layer as directed  inhalational spacing device (E-Z SPACER) 1 Each by Does Not Apply route as needed (for use of inhaler).  OXYGEN-AIR DELIVERY SYSTEMS 2 L by Nasal route continuous.  levothyroxine (SYNTHROID) 50 mcg tablet Take 1 Tab by mouth every morning for 30 days.  oxygen-air delivery systems (PV CLASSIC OXYGEN CONCENTRATOR) 2 L/min by Nasal route continuous. No current facility-administered medications for this visit. LAB DATA REVIEWED:  
 
Lab Results Component Value Date/Time Hemoglobin A1c 4.5 05/19/2019 01:34 AM  
 
No results found for: Nina Schwartz, MCA2, MCA3, MCAU, MCAU2, MCALPOCT Lab Results Component Value Date/Time TSH 33.80 (H) 05/17/2019 03:44 AM  
 
No results found for: Nallely Wade, Rebecca Lugo, Tacho Bernabe, [id number] Lab Results Component Value Date/Time WBC 11.5 (H) 05/20/2019 02:17 AM  
 HGB 11.1 (L) 05/20/2019 02:17 AM  
 PLATELET 657 53/64/1216 02:17 AM  
 
Lab Results Component Value Date/Time Sodium 141 05/20/2019 02:17 AM  
 Potassium 3.4 (L) 05/20/2019 02:17 AM  
 Chloride 106 05/20/2019 02:17 AM  
 CO2 30 05/20/2019 02:17 AM  
 BUN 25 (H) 05/20/2019 02:17 AM  
 Creatinine 0.85 05/20/2019 02:17 AM  
 Calcium 7.7 (L) 05/20/2019 02:17 AM  
 Magnesium 1.9 05/20/2019 02:17 AM  
 Phosphorus 2.5 (L) 05/20/2019 02:17 AM  
  
Lab Results Component Value Date/Time AST (SGOT) 46 (H) 05/19/2019 01:34 AM  
 Alk. phosphatase 89 05/19/2019 01:34 AM  
 Protein, total 5.7 (L) 05/19/2019 01:34 AM  
 Albumin 2.0 (L) 05/19/2019 01:34 AM  
 Globulin 3.7 05/19/2019 01:34 AM  
 
No results found for: IRON, FE, TIBC, IBCT, PSAT, FERR Total time: 60min Counseling / coordination time: 40min 
> 50% counseling / coordination?: yes re breathing and function

## 2019-06-17 NOTE — PATIENT INSTRUCTIONS
Dear Graciela Mc , It was a pleasure seeing you at home today with [facility] ([phone]) Someone from the Home Based Primary Care Team will see you again in 2 weeks / Marty Olea Your stated goal: To walk well again but my breathing limits me This is what we talked about (your plan): 1. Breathing 
-Your breathing is your main limitation 
-You are on 2L but you have to increase this when you walk 
-You have a hard time walking with the walker just a few feet 
-The Therapist makes you do it though 
-Standing sometimes it hard as well 
-You stay in your reclining all the time, even at night; you do not use the bed that your family bought for you that raise the head and feet  
-Your family also bought you a lift chair but you don't like that either 
-You only use the Albuterol Inhaler but not the others 
-I suggested a spacer for you 
-You use the Albuterol Neb once a day 
-You still smoke occasionally and we talked very clearly about safety when smoking and oxygen use 
-You use the Bumex but not daily, rather every other day; you do not appear fluid overloaded at this time 2. Right shoulder pain 
-You have been having right shoulder pain making it difficult to lift or extend the right arm 
-This is new 
-You just finished working with the Physical Therapist but would like to have them back for an assessment and exercises 
-I recommend Tylenol 650mg tid for several days 
-As we discussed, avoid BC Powders (you are already on Aspirin) 3. 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 You would like an eye assessment as well I provided names for a foot and eye doctor but we don't \"recommend\" them in particular; they are names we have that may perform services in the home 5.  You will not take a flu shot or pneumonia shot; our NP will perform Medicare Wellness Visit soon 6. You are do for bloodwork since follow up from your hospitalization; especially your thyroid which was not in control in the hospital; you state you are taking your medication and hopefully this is better now. We will also check your potassium which runs low as well. Lab Results Component Value Date/Time TSH 33.80 (H) 05/17/2019 03:44 AM  
 
Lab Results Component Value Date/Time Sodium 141 05/20/2019 02:17 AM  
 Potassium 3.4 (L) 05/20/2019 02:17 AM  
 Chloride 106 05/20/2019 02:17 AM  
 CO2 30 05/20/2019 02:17 AM  
 Anion gap 5 05/20/2019 02:17 AM  
 Glucose 138 (H) 05/20/2019 02:17 AM  
 BUN 25 (H) 05/20/2019 02:17 AM  
 Creatinine 0.85 05/20/2019 02:17 AM  
 BUN/Creatinine ratio 29 (H) 05/20/2019 02:17 AM  
 GFR est AA >60 05/20/2019 02:17 AM  
 GFR est non-AA >60 05/20/2019 02:17 AM  
 Calcium 7.7 (L) 05/20/2019 02:17 AM  
 
7. We refilled several of your medications today as well Health Maintenance Health Maintenance Due Topic Date Due  
 DTaP/Tdap/Td series (1 - Tdap) 04/13/1958  Shingrix Vaccine Age 50> (1 of 2) 04/13/1987  GLAUCOMA SCREENING Q2Y  04/13/2002  Bone Densitometry (Dexa) Screening  04/13/2002  Pneumococcal 65+ years (1 of 2 - PCV13) 04/13/2002  MEDICARE YEARLY EXAM  03/14/2018 Advance Care Planning This is what you have shared with us about Advance Care Planning Primary Decision Maker: Dana English - Daughter - [phone] Secondary Decision Maker: Kassi Slater - Son - [phone] We completed the Advance Directive which shares your wishes and a POST document today with a Durable DNR within in and noting your desire for Limited Medical Interventions which means no life support / no feeding tube. I will copy this document and we will return this to you to have in the home.  
 
We also discussed arranging a non urgent meeting with Hospice to learn more about their services during a time where it is not urgent; I recommend this with my patients so they will know they will have access the support when they need it most. 
 
If there are any concerns before that time, such as medication questions, worsening symptoms or a need to see a physician for an urgent or emergent situation; please call [phone]. A physician is also on call after our normal business hours of 8am to 5pm.  
 
In order to serve you better, please allow up to 48 hours for prescription refills to be processed. Certain medications may require more paperwork or a written prescription that you may need to  from the office. We appreciate you letting us know of any refill requests as soon as possible. Sincerely, Yg Dean MD and The Home Based Primary Care Team

## 2019-06-17 NOTE — ACP (ADVANCE CARE PLANNING)
Advance Care Planning (ACP) Provider [facility] Date of ACP Conversation: 06/17/19 Persons included in Conversation:  patient and POA Length of ACP Conversation in minutes:  20 minutes Authorized Decision Maker (if patient is incapable of making informed decisions): This person is:  
Healthcare Agent/Medical Power of  under Advance Directive Primary Decision Maker: Ivon Wongcaterina - Daughter - [phone] Secondary Decision Maker: Shannon Silva - Son For Patients with Decision Making Capacity:  
Values/Goals: Exploration of values, goals, and preferences if recovery is not expected, even with continued medical treatment in the event of:  Imminent death Conversation Outcomes / Follow-Up Plan:  
Completed POLST/POST/MOLST/MOST form Wishes: Durable DNR within POST Limited Medical Interventions; NO LIFE SUPPORT No Feeding Tube

## 2019-06-18 NOTE — PROGRESS NOTES
[facility]  Home Based Primary Care & Supportive Services  Plan of Care    [facility] Team Members: Dr. Leola Araujo, Sean Cross, NP; Jean Villegas, PRASANNA; Rusty Castle, MIKE; Kristian Hough, MIKE, Nadine Gonsalves RN, KEN Morin; Ac Rai PSR    Tariq Dugan  1935 / [id number]  female    Date of Initial Visit (Start of Care): 6/17/19    Diagnosis:  COPD (on home O2), hypokalemia, CAD, Ascending thoracic aneurysm 5.9 cm,Descending thoracic aneurysm 4.5 cm (pt has not been referred to vascular surgery, per chart, pt is not a candidate for surgery given her age, comorbidities, and risk),  uncontrolled hypothyroidism (secondary to not taking meds), history of fluid retention, obesity    Patient status summary: Tariq Dugan is a 82 y.o. who was referred by [facility]. They have the following medical problems: COPD (on home O2), hypokalemia, CAD, ascending thoracic aneurysm 5.9 cm, descending thoracic aneurysm 4.5 cm (pt has not been referred to vascular surgery, per chart, pt is not a candidate for surgery given her age, comorbidities, and risk),  uncontrolled hypothyroidism (secondary to not taking meds), history of fluid retention, obesity.        Advance Care Planning:    Primary Decision Maker: Mina Reynoldpal Alycia - [phone]    Secondary Decision Maker: Gino Prater - [phone]   Advance Care Planning 6/17/2019   Patient's Healthcare Decision Maker is: Named in scanned ACP document   Confirm Advance Directive Yes, on file   Does the patient have other document types Do Not Resuscitate       DME/Supplies:  Luellen Canavan, Wheelchair, Bedside Commode and Oxygen       Allergies   Allergen Reactions    Ciprofloxacin Other (comments)     \"high feeling\"    Codeine Other (comments)     hallucinations    Cortisone Other (comments)     Suicidal thoughts    Nsaids (Non-Steroidal Anti-Inflammatory Drug) Other (comments)     Ulcer history    Other Medication Palpitations Iodine dye    Oxycodone Other (comments)     Makes pt feel \"high\"    Penicillins Other (comments)     Weak feeling    Vytorin 10-10 [Ezetimibe-Simvastatin] Other (comments)     Abdominal swelling    Zantac [Ranitidine Hcl] Other (comments)     Blisters         Nutritional Requirements:   Oral with supported meal preparation    Functional/Activity Level:  Ambulatory with cane and/or walker    DNR    Safety Measures:   Fall risk and Oxygen use    Current Outpatient Medications   Medication Sig    albuterol (PROVENTIL HFA, VENTOLIN HFA, PROAIR HFA) 90 mcg/actuation inhaler Take 2 Puffs by inhalation every four (4) hours as needed for Wheezing.  aspirin delayed-release 81 mg tablet Take 1 Tab by mouth daily for 30 days.  potassium chloride SR (K-TAB) 20 mEq tablet Take 1 Tab by mouth two (2) times a day.  bumetanide (BUMEX) 1 mg tablet Take 0.5 Tabs by mouth two (2) times a day for 30 doses.  acetaminophen (TYLENOL) 325 mg tablet Take 2 Tabs by mouth three (3) times daily as needed for Pain.  ketoconazole (NIZORAL) 2 % topical cream Apply  to affected area two (2) times a day.  alclometasone (ACLOVATE) 0.05 % topical cream Apply  to affected area two (2) times a day. apply thin layer as directed    inhalational spacing device (E-Z SPACER) 1 Each by Does Not Apply route as needed (for use of inhaler).  oxygen-air delivery systems (PV CLASSIC OXYGEN CONCENTRATOR) 2 L/min by Nasal route continuous.  OXYGEN-AIR DELIVERY SYSTEMS 2 L by Nasal route continuous.  levothyroxine (SYNTHROID) 50 mcg tablet Take 1 Tab by mouth every morning for 30 days. No current facility-administered medications for this visit. The Plan of Care has been initiated for within 15 days of New Visit  and reviewed and updated by the Interdisciplinary Group (IDG) as frequently as the patient condition warrants. Plan of Care by Discipline:    1.  Provider  Ongoing evaluation of treatment interventions for specific disease state, Assessment of medication adverse effects, Reduction of polypharmacy within benefit/burden framework appropriate to age, function and disease state, Determine need for laboratory assessment based on patient disease status , Assess results of laboratory testing and adjust treatment plan as appropriate, Communicate with prior/current health care team members to enhance understanding of patient status, Assess current Advance Care Planning documents and make ACP recommendations as appropriate, Discuss goals of care and treatment preferences, including resuscitation and Assess patient for transition to Hospice as medically indicated    2. Nursing  Communicate with prior/current health care team members to enhance understanding of patient status, Coordination of care with specialty services, Review Health Maintenance with patient and provider; update as appropriate, Education for safety; falls and Education for safety; fire safety    3. Social Work  Introduce Home Based Primary Care and Supportive Services, New patient assessment of psychosocial needs and social determinants of health and Review Emergency Preparedness Plan    4. Other    Plan of Care Orders / Action Items:    1. Fire/ safety, smoking cessation, and on oxygen. 2. Spacer ordered to help with inhaler  3. Medication management, polypharmacy  4. PT for R shoulder movement and stregnthening  5.  Resources provided for nail care and eye exam      Estimated Visit Frequency:  Monthly Provider Visit, Every 3 month RN Visit and Every 3 month SW Visit

## 2019-06-24 PROBLEM — I71.21 ASCENDING AORTIC ANEURYSM: Status: ACTIVE | Noted: 2019-01-01

## 2019-06-24 PROBLEM — I71.9 DESCENDING AORTIC ANEURYSM (HCC): Status: ACTIVE | Noted: 2019-01-01

## 2019-06-24 NOTE — PROGRESS NOTES
Home Based Primary Care [facility]) & Supportive Services   
[phone] NOTE: Home Based Primary Care is a PROVIDER (MD/NP) based interdisciplinary practice (RN, LCSW, Pharmacy, Dietician) for patient's who cannot access (or have a taxing effort) primary / speciality medical care in an office setting. [facility] is NOT E[employer] but works with [employer]. when there is a skilled need. Our MD/NPs are integral in Care Transitions; PLEASE CALL [phone] if this patient arrives in the Emergency Department or Hospital.  
  
 
 
Date of Current Visit: 06/24/19 Patient/Family present for Current Visit: daughter, [place] Goals of Care as stated by the patient/family is: To stay home and avoid going to the hospital if possible Preferences for hospitalization if that were to be necessary: 
[] Patient DOES NOT WANT hospitalization; focus all treatments at home 
[x] Patient WOULD WANT hospitalization for potentially reversible causes Patient prefers hospitalization at:  [id number] [address] [facility] Is this encounter billed on time:YES Total time: 60 min Counseling / coordination time: Health maintenance counseling in regards to vaccinations, tobacco cessation counseling and current tobacco use, oxygen safety, blood pressure control in the setting of a sending and descending aneurysms, venipuncture for lab draw, COPD disease counseling and medication use, review and discussion of thoracic surgery recommendation, medication use counseling of diuretics and following prescriptions as directed/prescribed  
> 50% counseling / coordination? YES:  
 
ASSESSMENT & PLAN Diagnoses and all orders for this visit: 
 
1. Other emphysema ([facility] [address] -     [place] SMOKING AND TOBACCO USE CESSATION > 10 MINUTES 
-     COLLECTION VENOUS BLOOD,VENIPUNCTURE 
 -Discussed at length COPD with emphysema and review of previous pulmonology notes.   Patient has been prescribed Danielito Aidee as well as Anora.  She is elected not to use these medications as prescribed. She is also been prescribed Symbicort in which she has not used either. 
 -\"I dont want to change my medications\" 
 -Discussed risk factors for exacerbation including potential treatment with prednisone. Discussed implementation of this medication may reduce or avoid hospitalization when use. Patient does not agree to use and \"I do not want no prednisone\". -Tobacco cessation recommended, counseling provided. 2. Ascending aortic aneurysm ([address] -     [place] SMOKING AND TOBACCO USE CESSATION > 10 MINUTES 
-     COLLECTION VENOUS BLOOD,VENIPUNCTURE 
 -Thoracic surgery consult reviewed from Anaya Sorenson MD -\"Though her aortic aneurysm disease places her at risk for complications and death, she is a prohibitive  risk for surgical intervention. Recommend strict blood pressure control along with general medical measures. \" 
 -These recommendations were discussed with patient and family. 
 -No blood pressure medication changes at this time. She is stable on her current regimen. 3. Descending aortic aneurysm ([address] -     [place] SMOKING AND TOBACCO USE CESSATION > 10 MINUTES 
-     COLLECTION VENOUS BLOOD,VENIPUNCTURE 
-As noted above, not a candidate for surgical intervention. Blood pressure control recommended. 4. Acquired hypothyroidism 
-     COLLECTION VENOUS BLOOD,VENIPUNCTURE 
 -Labs collected today upon review dose adjustments will be made. 5. Current smoker -     [place] SMOKING AND TOBACCO USE CESSATION > 10 MINUTES 
-     COLLECTION VENOUS BLOOD,VENIPUNCTURE 
-Patient continues to express her wish to continue smoking. Offered support and tobacco cessation resources should she want to pursue non-smoking lifestyle. -Supplemental oxygen safety reviewed in the setting of current tobacco use in the home. Patient has proper signage in place.  
 
6. MCI (mild cognitive impairment) 
-     COLLECTION VENOUS BLOOD,VENIPUNCTURE 
 -Reinforcement of surroundings, caregiver support provided. 7. Initial Medicare annual wellness visit 
-     COLLECTION VENOUS BLOOD,VENIPUNCTURE 8. Screening for alcoholism -     [place] ANNUAL ALCOHOL SCREEN 15 MIN 
-     COLLECTION VENOUS BLOOD,VENIPUNCTURE 
 
9. Screening for depression 
-     [address]. Encounter for tobacco use cessation counseling -     [place] SMOKING AND TOBACCO USE CESSATION > 10 MINUTES 
-     COLLECTION VENOUS BLOOD,VENIPUNCTURE 11. Encounter for laboratory test 
-     COLLECTION VENOUS BLOOD,VENIPUNCTURE Follow-up and Dispositions · Return in about 1 month (around 7/26/2019). I have left a SUMMARY / INSTRUCTIONS from today's visit with the patient/family in the home HEALTH MAINTENANCE There are no preventive care reminders to display for this patient. CC & SUBJECTIVE including ROS & FUNCTION Chief Complaint Patient presents with Talon Berry Annual Wellness Visit Renny Stevens is a 82 y.o. with chronic medical conditions as listed in the patient's updated Problem List (see below) Their Subjective Information provided as today's visit includes: Patient reports chronic shortness of breath worse with repositioning or activity. Continues to smoke approximately half a cigarette a day. She requires continuous 2 L of supplemental oxygen via nasal cannula. Podiatry and optometry in-home assessments pending at this time. Patient reports she takes her Bumex approximately every other day half tab then as needed depending on edema. She is using potassium supplementation 1 tab daily despite directions on bottle. Patient and her daughter, [place], affirm that she is eating and drinking well. She continues to dependent on caregiver support and sleeps in her recliner chair in the living room. There have been no falls.  
 
Positive ROS findings: Chronic shortness of breath, tobacco abuse, intermittent lower extremity edema The following systems were [x] reviewed / [] unable to be reviewed Systems: constitutional, ears/nose/mouth/throat, respiratory, gastrointestinal, genitourinary, musculoskeletal, integumentary, neurologic, psychiatric, endocrine. PPS:40-50% Karn[place]sky:  
Timed Up and Go (TUG): Fall Risk Assessment, last 12 mths 6/24/2019 Able to walk? No  
Fall in past 12 months? -  
 
 
Current Durable Medical Equipment in Home: 
[x] Hospital Bed 
[x] Bedside Commode 
[x] Wheelchair 
[] Pooja Crater 
[x] Walker 
[x] Respiratory Support: Continuous oxygen, nebulizer ADVANCE CARE PLANNING The following information was updated I/ reviewed as accurate in Orders and the Advance Care Planning Navigator: Durable DO NOT RESUSCITATE on file Primary Decision Maker: Dana English - Daughter - [phone] Secondary Decision Maker: Kassi Slater - Son - [phone] Advance Care Planning 6/17/2019 Patient's Healthcare Decision Maker is: Named in scanned ACP document Confirm Advance Directive Yes, on file Does the patient have other document types Do Not Resuscitate VITAL SIGNS & PHYSICAL EXAM  
 
Median Arm Circumference (inches): Wt Readings from Last 3 Encounters:  
06/24/19 197 lb (89.4 kg) 05/20/19 194 lb 14.4 oz (88.4 kg) 10/02/14 166 lb 10.7 oz (75.6 kg) Temp Readings from Last 3 Encounters:  
06/24/19 97.6 °F (36.4 °C) (Temporal) 06/21/19 98 °F (36.7 °C) (Temporal) 06/18/19 98.5 °F (36.9 °C) (Temporal) BP Readings from Last 3 Encounters:  
06/24/19 118/58  
06/21/19 118/68  
06/18/19 120/70 Pulse Readings from Last 3 Encounters:  
06/24/19 86  
06/21/19 84  
06/18/19 80  
 
 
(Constitutional) Patient Physical Status:  female, advanced age, sitting upright in recliner chair, no acute distress Pacemaker:N/A 
ICD:N/A Feeding Tube:N/A 
Urine Catheter:N/A Other: 
 
Eyes: pupils equal, anicteric ENMT: no nasal discharge, moist mucous membranes Cardiovascular: regular rhythm, no M/R/G, distal pulses intact Respiratory: Wearing nasal cannula, breathing not labored, symmetric, CTA bilat. anteriorly, right posterior expiratory wheezes Gastrointestinal: Round, obese abdomen, + bowel sounds, soft, non-tender, non-distended Musculoskeletal: no deformity, no tenderness to palpation Skin: warm, dry, no open wounds rashes or ulcerations. Dry flaky skin noted to upper and lower extremities. Neurologic: A/Ox3 however short-term memory appears to be impaired, following commands, moving all extremities equally Psychiatric: normal affect, no hallucinations Other: Long and hypertrophic toenails that curl under and now digging into skin PROBLEM LIST / PAST MEDICAL / SOCIAL HX Patient Active Problem List  
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  
 Acquired hypothyroidism E03.9  Other emphysema ([facility] [address] J43.8  Current smoker F17.200  Ascending aortic aneurysm (HCC) I71.2  Descending aortic aneurysm (HCC) I71.9 Family History Problem Relation Age of Onset  Heart Disease Mother  Cancer Father   
     lung  Diabetes Father  Diabetes Son   
 
Social History Socioeconomic History  Marital status:  Spouse name: Not on file  Number of children: Not on file  Years of education: Not on file  Highest education level: Not on file Tobacco Use  Smoking status: Current Every Day Smoker Packs/day: 0.50 Years: 65.00 Pack years: 32.50  Smokeless tobacco: Never Used Substance and Sexual Activity  Alcohol use: No  
 Drug use: No  
  Types: Prescription, OTC MEDICATIONS & PRESCRIPTIONS Allergies Allergen Reactions  Ciprofloxacin Other (comments) \"high feeling\"  Codeine Other (comments) hallucinations  Cortisone Other (comments) Suicidal thoughts  Nsaids (Non-Steroidal Anti-Inflammatory Drug) Other (comments) Ulcer history  Other Medication Palpitations Iodine dye  Oxycodone Other (comments) Makes pt feel \"high\"  Penicillins Other (comments) Weak feeling  Vytorin 10-10 [Ezetimibe-Simvastatin] Other (comments) Abdominal swelling  Zantac [Ranitidine Hcl] Other (comments) Blisters Current Outpatient Medications Medication Sig  
 albuterol (ACCUNEB) 0.63 mg/3 mL nebulizer solution 0.63 mg by Nebulization route daily.  aspirin delayed-release 81 mg tablet Take 1 Tab by mouth daily for 30 days.  potassium chloride SR (K-TAB) 20 mEq tablet Take 1 Tab by mouth two (2) times a day.  bumetanide (BUMEX) 1 mg tablet Take 0.5 Tabs by mouth two (2) times a day for 30 doses.  acetaminophen (TYLENOL) 325 mg tablet Take 2 Tabs by mouth three (3) times daily as needed for Pain.  ketoconazole (NIZORAL) 2 % topical cream Apply  to affected area two (2) times a day.  alclometasone (ACLOVATE) 0.05 % topical cream Apply  to affected area two (2) times a day. apply thin layer as directed  inhalational spacing device (E-Z SPACER) 1 Each by Does Not Apply route as needed (for use of inhaler).  oxygen-air delivery systems (PV CLASSIC OXYGEN CONCENTRATOR) 2 L/min by Nasal route continuous.  OXYGEN-AIR DELIVERY SYSTEMS 2 L by Nasal route continuous.  albuterol (PROVENTIL HFA, VENTOLIN HFA, PROAIR HFA) 90 mcg/actuation inhaler Take 2 Puffs by inhalation every four (4) hours as needed for Wheezing. No current facility-administered medications for this visit. No orders of the defined types were placed in this encounter. TEST DATA REVIEWED:  
 
Lab Results Component Value Date/Time Hemoglobin A1c 4.5 05/19/2019 01:34 AM  
 
No results found for: Eladio Wesley, MCA2, MCA3, MCAU, MCAU2, MCALPOCT Lab Results Component Value Date/Time TSH 33.80 (H) 05/17/2019 03:44 AM  
 
No results found for: Akosua Night, Gerardine Boo, [place], [id number] Lab Results Component Value Date/Time WBC 11.5 (H) 05/20/2019 02:17 AM  
 HGB 11.1 (L) 05/20/2019 02:17 AM  
 PLATELET 176 38/05/9652 02:17 AM  
 
Lab Results Component Value Date/Time Sodium 141 05/20/2019 02:17 AM  
 Potassium 3.4 (L) 05/20/2019 02:17 AM  
 Chloride 106 05/20/2019 02:17 AM  
 CO2 30 05/20/2019 02:17 AM  
 BUN 25 (H) 05/20/2019 02:17 AM  
 Creatinine 0.85 05/20/2019 02:17 AM  
 Calcium 7.7 (L) 05/20/2019 02:17 AM  
 Magnesium 1.9 05/20/2019 02:17 AM  
 Phosphorus 2.5 (L) 05/20/2019 02:17 AM  
  
Lab Results Component Value Date/Time AST (SGOT) 46 (H) 05/19/2019 01:34 AM  
 Alk.  phosphatase 89 05/19/2019 01:34 AM  
 Protein, total 5.7 (L) 05/19/2019 01:34 AM  
 Albumin 2.0 (L) 05/19/2019 01:34 AM  
 Globulin 3.7 05/19/2019 01:34 AM  
 
No results found for: IRON, FE, TIBC, IBCT, PSAT, FERR

## 2019-06-24 NOTE — PROGRESS NOTES
This is an Subsequent Joanne-Yossi Exam (AWV) I have reviewed the patient's medical history in detail and updated the computerized patient record. History Past Medical History:  
Diagnosis Date  Abdominal pain   
 nausea, bloating, frequent indigestion, ulcers  Arthritis  Cancer (HCC)   
 squamous cell - leg  Chronic pain  COPD  Dyspepsia and other specified disorders of function of stomach  Easy bruising  Hypercholesterolemia  Osteoporosis  Other ill-defined conditions(799.89)   
 blood transfusion with miscarriage  Other ill-defined conditions(799.89)   
 gout  Other ill-defined conditions(799.89) 1/13  
 hematuria, being evaluated by Dr Leo Falling  Thyroid disease Past Surgical History:  
Procedure Laterality Date  ABDOMEN SURGERY PROC UNLISTED    
 colon resection  HX APPENDECTOMY  HX BREAST BIOPSY    
 bilateral  
 HX CHOLECYSTECTOMY  11/26/12  
 - LAPAROSOCPIC CHOLECYSTECTOMY WITH GRAMS POSSIBLE OPEN  
 HX DILATION AND CURETTAGE    
 HX GI    
 colon surgery [address]  
 hysterectomy-right ovary removed  HX HEART CATHETERIZATION    
 HX HEENT    
 tonsillectomy  HX ORTHOPAEDIC    
 carpal tunnel-left  WI EGD BALLOON DILATION ESOPHAGUS <30 MM DIAM  1/21/2013  WI EGD TRANSORAL BIOPSY SINGLE/MULTIPLE  1/21/2013  WI ERCP W/SPHINCTEROTOMY/PAPILLOTOMY  5/10/2013 Current Outpatient Medications Medication Sig Dispense Refill  albuterol (ACCUNEB) 0.63 mg/3 mL nebulizer solution 0.63 mg by Nebulization route daily.  aspirin delayed-release 81 mg tablet Take 1 Tab by mouth daily for 30 days. 30 Tab 11  
 potassium chloride SR (K-TAB) 20 mEq tablet Take 1 Tab by mouth two (2) times a day. 60 Tab 3  
 bumetanide (BUMEX) 1 mg tablet Take 0.5 Tabs by mouth two (2) times a day for 30 doses.  30 Tab 11  
 acetaminophen (TYLENOL) 325 mg tablet Take 2 Tabs by mouth three (3) times daily as needed for Pain. 120 Tab 11  
 ketoconazole (NIZORAL) 2 % topical cream Apply  to affected area two (2) times a day. 60 g 3  
 alclometasone (ACLOVATE) 0.05 % topical cream Apply  to affected area two (2) times a day. apply thin layer as directed 15 g 0  
 inhalational spacing device (E-Z SPACER) 1 Each by Does Not Apply route as needed (for use of inhaler). 1 Device 1  
 oxygen-air delivery systems (PV CLASSIC OXYGEN CONCENTRATOR) 2 L/min by Nasal route continuous.  OXYGEN-AIR DELIVERY SYSTEMS 2 L by Nasal route continuous.  albuterol (PROVENTIL HFA, VENTOLIN HFA, PROAIR HFA) 90 mcg/actuation inhaler Take 2 Puffs by inhalation every four (4) hours as needed for Wheezing. 1 Inhaler 0 Allergies Allergen Reactions  Ciprofloxacin Other (comments) \"high feeling\"  Codeine Other (comments)  
  hallucinations  Cortisone Other (comments) Suicidal thoughts  Nsaids (Non-Steroidal Anti-Inflammatory Drug) Other (comments) Ulcer history  Other Medication Palpitations Iodine dye  Oxycodone Other (comments) Makes pt feel \"high\"  Penicillins Other (comments) Weak feeling  Vytorin 10-10 [Ezetimibe-Simvastatin] Other (comments) Abdominal swelling  Zantac [Ranitidine Hcl] Other (comments) Blisters Family History Problem Relation Age of Onset  Heart Disease Mother  Cancer Father   
     lung  Diabetes Father  Diabetes Son   
 
Social History Tobacco Use  Smoking status: Current Every Day Smoker Packs/day: 0.50 Years: 65.00 Pack years: 32.50  Smokeless tobacco: Never Used Substance Use Topics  Alcohol use: No  
 
Patient Active Problem List  
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  
 Acquired hypothyroidism E03.9  Other emphysema ([address] J43.8  Current smoker F17.200 Depression Risk Factor Screening:  
 
3 most recent PHQ Screens 6/24/2019 Little interest or pleasure in doing things Not at all Feeling down, depressed, irritable, or hopeless Not at all Total Score PHQ 2 0 Alcohol Risk Factor Screening: You do not drink alcohol or very rarely. Functional Ability and Level of Safety:  
 
Hearing Loss The patient needs further evaluation. Activities of Daily Living The home contains: rugs Patient needs help with:  preparing meals, laundry, housework, managing medications, managing money, eating, dressing, bathing, hygiene and bathroom needs Fall Risk Fall Risk Assessment, last 12 mths 6/24/2019 Able to walk? No  
Fall in past 12 months? -  
 
 
Abuse Screen Patient is not abused Cognitive Screening Evaluation of Cognitive Function: 
Has your family/caregiver stated any concerns about your memory: no 
Normal 
 
Patient Care Team  
Patient Care Team: 
Sarah Ron NP as PCP - General (Nurse Practitioner) Ed Pineda MD as Referring Provider [facility]) [facility] MD as Surgeon (Cardiothoracic Surgery) Larry Camara MD (Palliative Medicine) Joselin Fly Assessment/Plan Education and counseling provided: 
Are appropriate based on today's review and evaluation Diagnoses and all orders for this visit: 
 
1. Other emphysema ([address] 2. Acquired hypothyroidism 3. MCI (mild cognitive impairment) 4. Initial Medicare annual wellness visit 5. Screening for alcoholism -     [place] ANNUAL ALCOHOL SCREEN 15 MIN 6. Screening for depression 
-     Raj No 7. Current smoker There are no preventive care reminders to display for this patient.

## 2019-06-25 NOTE — TELEPHONE ENCOUNTER
Drop in H/H. Will need repeat in 1 week or soon if patient reports a change. Will also need FIT test for occult blood screening. Likely GI cause and needs work up. Continue potassium dose as she has been taking at home - daily even though it is written for twice a day. TSH much better. No dose adjustment at this time.

## 2019-06-26 NOTE — TELEPHONE ENCOUNTER
----- Message from Maximiliano Tenorio NP sent at 6/25/2019  4:37 PM EDT -----  Drop in H/H. Will need repeat in 1 week or soon if patient reports a change. Will also need FIT test for occult blood screening. Likely GI cause and needs work up. Continue potassium dose as she has been taking at home - daily even though it is written for twice a day. TSH much better. No dose adjustment at this time.

## 2019-06-28 NOTE — TELEPHONE ENCOUNTER
Charlotte rodríguez/ [facility] is calling to get clarification on script for alclomeasone dated for 6/27/19. Please use reference number [id number].

## 2019-06-28 NOTE — TELEPHONE ENCOUNTER
Dominic Gasca, Antonia Horton; [facility]   Caller: Unspecified (Today,  9:38 AM)             Patient does not need any more of this medication. There is a whole container in the home. No refill.       Returned call to [employer], spoke with Murphy Gorman and advised per NP patient does not need refill at this time

## 2019-07-05 NOTE — TELEPHONE ENCOUNTER
Patient's daughter called. Home Health there today and left kit for stool sample and will  Monday. She would like to know when provider is coming for visit. Please call.

## 2019-07-10 NOTE — TELEPHONE ENCOUNTER
[facility] called to say patient was under the assumption that we needed a stool sample. She has the stool sample that they put in the refrigerator on Sunday. She has it on ice now and wants to know what to do with it. Do we need it for sample? Please call Katheryn at [phone].

## 2019-07-19 NOTE — TELEPHONE ENCOUNTER
Calling regarding blood work from 2 weeks ago and the stool test that was being done. States we were going to be coming out and have not come out and she has heard nothing. Advised nurse would look into this and call her back.

## 2019-07-19 NOTE — TELEPHONE ENCOUNTER
Returned call to [place] and informed of lab results dated 6/24/19. Advised per NP note last week, stool sample was no longer needed, per Carrol Mary picked up sample and she was hoping to get that results. Advised I will reach out to [facility] and try to obtain results for NP review. Advised that follow up visit with Landy Fields is schedule for 7/26/19. [place] verbalized understanding    Outgoing call placed to [employer], [address] [place] [address] and she stated that they are not showing any lab results for stool sample.

## 2019-07-23 NOTE — TELEPHONE ENCOUNTER
Avinash Ortiz, PRASANNA Richardson RN   Caller: Unspecified (Today,  2:54 PM)             Please refer to my last encounter with her 6/24 about my recommendations at that time. She refused to make adjustments to her breathing /medication regimen. We can order an xray for review. She may also need labs. Returned call to patient and advised per NP for breathing she recommends using the [facility] or Symbicort as prescribed. Also advised that we will order chest x-ray tomorrow and Cyndi will see her as schedule on Friday depending on x-ray results.   [place] verbalized understanding

## 2019-07-23 NOTE — TELEPHONE ENCOUNTER
Returned call to patient's daughter, [place] and she stated that patient is having increase LE edema - symptoms started 1.5 week ago, and she has increased Bumex 0.5 mg bid, with not much improvement, and she is now noting swelling in Bilateral arms. She also reported that patient is having breathing issues and using her nebulizer more frequently - she continues on oxygen 2 Liters. O2 sat today = 98%. Advised I will discuss with NP and call back with recommendations.

## 2019-07-26 PROBLEM — K57.30 DIVERTICULA OF COLON: Status: ACTIVE | Noted: 2019-01-01

## 2019-07-26 NOTE — TELEPHONE ENCOUNTER
Symbicort is not covered by insurance. Insurance recommends Advair as alternative. Patient informed, alternative ordered.

## 2019-07-26 NOTE — PROGRESS NOTES
[facility]    [phone]      NOTE: Home [facility] Care is a PROVIDER (MD/NP) based interdisciplinary practice (RN, LCSW, Pharmacy, Dietician) for patient's who cannot access (or have a taxing effort) primary / speciality medical care in an office setting. [facility] is NOT [employer] but works with [employer] when there is a skilled need. Our MD/NPs are integral in Care Transitions; PLEASE CALL [phone] if this patient arrives in the Emergency Department or Hospital.          Date of Current Visit: 07/26/19    Patient/Family present for Current Visit: daughter, Reynaldo Plater of Care as stated by the patient/family is: To stay home and avoid going to the hospital if possible    Preferences for hospitalization if that were to be necessary:  [x] Patient DOES NOT WANT hospitalization; focus all treatments at home  [] Patient WOULD WANT hospitalization for potentially reversible causes  Patient prefers hospitalization at:      Is this encounter billed on time:YES    Total time: 60 min + 30min ACP   Counseling / coordination time: goals of care, coordination of care with hospice, anemia causes and work up, FIT testing for occult blood, tobacco cession, COPD exacerbation treatment, medication management, direct care for venipuncture and blood collection, education on use of Aclovate, medication review and discussion of labs, edema managment  > 50% counseling / coordination? YES:     ASSESSMENT & PLAN   Diagnoses and all orders for this visit:    1. Other emphysema ([address] w/ exacerbation and SOB  -     CBC WITH AUTOMATED DIFF  -     METABOLIC PANEL, COMPREHENSIVE  -     NT-PRO BNP  -     REFERRAL TO HOSPICE  - Continues to smoke cigarettes.  Down to about 1 cigarette a day  - Remains on continuous oxygen  - Discussed that she is having an exacerbation  - Also notable decline in function in recent weeks  - Refusing prednisone or changes to her breathing medication regimen. Discussed implementation of this medication may reduce or avoid hospitalization when used. Patient does not agree to use and \"I do not want no prednisone\". She will make an \"attempt\" to try Symbicort. - States \"If I die from not breathing then I die\"  - Agreeable to CXR and blood work    2. Localized edema  -     CBC WITH AUTOMATED DIFF  -     METABOLIC PANEL, COMPREHENSIVE  -     NT-PRO BNP  - Continue Bumex 0.5mg BID  - Will adjust diuretics once labs results reviewed  - Elevate legs while in recliner chair    5. Acquired hypothyroidism  -     CBC WITH AUTOMATED DIFF  -     METABOLIC PANEL, COMPREHENSIVE  -     NT-PRO BNP  - TSH checked at last encounter. Continue current dose    6. Acute blood loss anemia  -     REFERRAL TO HOSPICE  - Prolonged discussion regarding new anemia  - Discussed FIT card and testing for occult blood in the stool. Discussed in the event the test demonstrated blood in her stool she would be sent for work up. Patient does not want any testing or work up for this. Discussed risks of not pursuing work up including continued blood loss or death. Ms. Triston Schrader is adament that should does not want work up for abnormal results. She is willing to take iron supplementation but that would be it. After prolonged conversation, testing for occult blood will be deferred. - Repeating CBC today  - Goals of care discussion (see ACP note)    7. Advance care planning  -     See ACP note  - Referral made to Hospice    Other - education provided on use of Aclovate cream in which is it to be used to the skin cancer area near left temporal region. Refill provided. Follow-up and Dispositions    · Return TBD, pending hospice admission. I have left a SUMMARY / INSTRUCTIONS from today's visit with the patient/family in the home    HEALTH MAINTENANCE     There are no preventive care reminders to display for this patient.      CC & SUBJECTIVE including ROS & FUNCTION     Chief Complaint Patient presents with    COPD     w/ SOB  and increased coughing    Leg Swelling        Daniela Mata is a 82 y.o. with chronic medical conditions as listed in the patient's updated Problem List (see below)    Their Subjective Information provided as today's visit includes:     Daughter reports that she has noticed an increased use of nebulizers, coughing and work of breathing over the last \"few weeks\". Also reported decline in function. Increased SOB w/ transfer to bedside commode. She continues to dependent on caregiver support and sleeps in her recliner chair in the living room. The patient denies feeling that her breathing has changed. States \"If I die from not breathing then I die\". There has also been a change in appetite by both the patient and daughter. Pt reports that she doesn't each much and doesn't have an appetite for approximately 1 month. Continues to drink fluids without issue. She remains adamant that she is doing fine on her current nebulizer regimen of albuterol. She has refused multiple recommended inhalers in the past.    Continues to smoke approximately a cigarette a day. She requires continuous 2 L of supplemental oxygen via nasal cannula. Patient reports she takes her Bumex approximately every other day half tab then as needed depending on edema. She is using potassium supplementation 1 tab daily despite directions on bottle. .  There have been no falls. Positive ROS findings: Chronic shortness of breath, tobacco abuse, intermittent lower extremity edema    The following systems were [x] reviewed / [] unable to be reviewed  Systems: constitutional, ears/nose/mouth/throat, respiratory, gastrointestinal, genitourinary, musculoskeletal, integumentary, neurologic, psychiatric, endocrine. PPS:40-50%  Karnofsky:   Timed Up and Go (TUG):   Fall Risk Assessment, last 12 mths 6/24/2019   Able to walk?  No   Fall in past 12 months? -       Current Durable Medical Equipment in Home:  [x] Hospital Bed  [x] Bedside Commode  [x] Wheelchair  [] Jeaneen Signs  [x] Walker  [x] Respiratory Support: Continuous oxygen, nebulizer    ADVANCE CARE PLANNING                                 The following information was updated I/ reviewed as accurate in Orders and the Advance Care Planning Navigator:  Durable DO NOT RESUSCITATE on file    Primary Decision Maker: Daysi Tong - [phone]    Secondary Decision Maker: Jeremias Sheridan - [phone]  Advance Care Planning 7/26/2019   Patient's Salomen is: Named in scanned ACP document   Confirm Advance Directive Yes, on file   Does the patient have other document types Do Not Resuscitate        VITAL SIGNS & PHYSICAL EXAM     Median Arm Circumference (inches):    35.2 cm ARIADNA on 7/26/19    Wt Readings from Last 3 Encounters:   06/24/19 197 lb (89.4 kg)   05/20/19 194 lb 14.4 oz (88.4 kg)   10/02/14 166 lb 10.7 oz (75.6 kg)     Temp Readings from Last 3 Encounters:   07/26/19 97.8 °F (36.6 °C) (Temporal)   07/10/19 97 °F (36.1 °C)   07/05/19 98.7 °F (37.1 °C)     BP Readings from Last 3 Encounters:   07/26/19 128/64   07/10/19 140/70   07/05/19 120/60     Pulse Readings from Last 3 Encounters:   07/26/19 85   07/10/19 81   07/05/19 81       (Constitutional) Patient Physical Status:  female, advanced age, sitting upright in recliner chair, no acute distress    Pacemaker:N/A  ICD:N/A  Feeding Tube:N/A  Urine Catheter:N/A  Other:    Eyes: pupils equal, anicteric  ENMT: no nasal discharge, moist mucous membranes  Cardiovascular: regular rhythm, no M/R/G, distal pulses intact  Respiratory: Wearing nasal cannula, breathing mildly labored with conversation, symmetric,signifcant expiratory wheezes in all lung fields  Gastrointestinal: Round, obese abdomen, + bowel sounds, soft, non-tender, non-distended  Musculoskeletal: no deformity, no tenderness to palpation  Skin: warm, dry, no open wounds rashes or ulcerations.   Dry flaky skin noted to upper and lower extremities.   Neurologic: A/Ox3 however short-term memory appears to be impaired, following commands, moving all extremities equally  Psychiatric: normal affect, no hallucinations  Other:      PROBLEM LIST / PAST MEDICAL / SOCIAL HX     Patient Active Problem List   Diagnosis Code    Hypokalemia E87.6    Imbalance R26.89    Late effect of stroke I69.30    MCI (mild cognitive impairment) G31.84    Shortness of breath R06.02    Acquired hypothyroidism E03.9    Other emphysema ([address] J43.8    Current smoker F17.200    Ascending aortic aneurysm (HCC) I71.2    Descending aortic aneurysm (HCC) I71.9    Diverticula of colon K57.30      Family History   Problem Relation Age of Onset    Heart Disease Mother     Cancer Father         lung    Diabetes Father     Diabetes Son      Social History     Socioeconomic History    Marital status:      Spouse name: Not on file    Number of children: Not on file    Years of education: Not on file    Highest education level: Not on file   Tobacco Use    Smoking status: Current Every Day Smoker     Packs/day: 0.50     Years: 65.00     Pack years: 32.50    Smokeless tobacco: Never Used    Tobacco comment: currently about 1 cigarette a day   Substance and Sexual Activity    Alcohol use: No    Drug use: No     Types: Prescription, OTC        MEDICATIONS & PRESCRIPTIONS     Allergies   Allergen Reactions    Ciprofloxacin Other (comments)     \"high feeling\"    Codeine Other (comments)     hallucinations    Cortisone Other (comments)     Suicidal thoughts    Nsaids (Non-Steroidal Anti-Inflammatory Drug) Other (comments)     Ulcer history    Other Medication Palpitations     Iodine dye    Oxycodone Other (comments)     Makes pt feel \"high\"    Penicillins Other (comments)     Weak feeling    Vytorin 10-10 [Ezetimibe-Simvastatin] Other (comments)     Abdominal swelling    Zantac [Ranitidine Hcl] Other (comments) Blisters        Current Outpatient Medications   Medication Sig    bumetanide (BUMEX) 1 mg tablet Take 0.5 mg by mouth two (2) times a day.  levothyroxine (SYNTHROID) 50 mcg tablet Synthroid 50 mcg tablet    senna (SENNA) 8.6 mg tablet Take 2 Tabs by mouth daily.  alclometasone (ACLOVATE) 0.05 % topical cream Apply  to affected area two (2) times a day. apply thin layer as directed to cancerous spot    albuterol (ACCUNEB) 0.63 mg/3 mL nebulizer solution 0.63 mg by Nebulization route daily.  albuterol (PROVENTIL HFA, VENTOLIN HFA, PROAIR HFA) 90 mcg/actuation inhaler Take 2 Puffs by inhalation every four (4) hours as needed for Wheezing.  potassium chloride SR (K-TAB) 20 mEq tablet Take 1 Tab by mouth two (2) times a day.  ketoconazole (NIZORAL) 2 % topical cream Apply  to affected area two (2) times a day.  oxygen-air delivery systems (PV CLASSIC OXYGEN CONCENTRATOR) 2 L/min by Nasal route continuous.  OXYGEN-AIR DELIVERY SYSTEMS 2 L by Nasal route continuous.  fluticasone propion-salmeterol (ADVAIR/WIXELA) 500-50 mcg/dose diskus inhaler Take 1 Puff by inhalation every twelve (12) hours.  acetaminophen (TYLENOL) 325 mg tablet Take 2 Tabs by mouth three (3) times daily as needed for Pain.  inhalational spacing device (E-Z SPACER) 1 Each by Does Not Apply route as needed (for use of inhaler). No current facility-administered medications for this visit. Medications Ordered Today   Medications    alclometasone (ACLOVATE) 0.05 % topical cream     Sig: Apply  to affected area two (2) times a day. apply thin layer as directed to cancerous spot     Dispense:  15 g     Refill:  2    DISCONTD: budesonide-formoterol (SYMBICORT) 160-4.5 mcg/actuation HFAA     Sig: Take 2 Puffs by inhalation two (2) times a day.  Rinse mouth after use     Dispense:  2 Inhaler     Refill:  1         TEST DATA REVIEWED:     Lab Results   Component Value Date/Time    Hemoglobin A1c 4.5 05/19/2019 01:34 AM No results found for: MCACR, MCA1, MCA2, MCA3, MCAU, MCAU2, MCALPOCT  Lab Results   Component Value Date/Time    TSH 5.330 (H) 06/24/2019 11:26 AM     No results found for: Abiodun Egan, VD3RIA      Lab Results   Component Value Date/Time    WBC 4.9 07/26/2019 11:38 AM    HGB 8.1 (L) 07/26/2019 11:38 AM    PLATELET 827 63/76/6121 11:38 AM     Lab Results   Component Value Date/Time    Sodium 142 07/26/2019 11:38 AM    Potassium 3.3 (L) 07/26/2019 11:38 AM    Chloride 101 07/26/2019 11:38 AM    CO2 29 07/26/2019 11:38 AM    BUN 8 07/26/2019 11:38 AM    Creatinine 0.62 07/26/2019 11:38 AM    Calcium 8.2 (L) 07/26/2019 11:38 AM    Magnesium 1.9 05/20/2019 02:17 AM    Phosphorus 2.5 (L) 05/20/2019 02:17 AM      Lab Results   Component Value Date/Time    AST (SGOT) 27 07/26/2019 11:38 AM    Alk.  phosphatase 130 (H) 07/26/2019 11:38 AM    Protein, total 4.9 (L) 07/26/2019 11:38 AM    Albumin 2.7 (L) 07/26/2019 11:38 AM    Globulin 3.7 05/19/2019 01:34 AM     No results found for: IRON, FE, TIBC, IBCT, PSAT, FERR

## 2019-07-28 NOTE — ACP (ADVANCE CARE PLANNING)
Advance Care Planning (ACP) Provider Note - Comprehensive     Date of ACP Conversation: 07/26/19  Persons included in Conversation:  patient and family  Length of ACP Conversation in minutes:  30 minutes    Authorized Decision Maker (if patient is incapable of making informed decisions): This person is:  Healthcare Agent/Medical Power of  under Advance Directive      Primary Decision Maker: Marisol Concepcion - Daughter - [phone]    Secondary Decision Maker: Angelika Jeffery - Son - [phone]        General ACP for ALL Patients with Decision Making Capacity:   Importance of advance care planning, including choosing a healthcare agent to communicate patient's healthcare decisions if patient lost the ability to make decisions, such as after a sudden illness or accident    Review of Existing Advance Directive:  Does this advance directive still reflect your preferences? Yes (Provide new form/Refer for assistance in updating)    For Serious or Chronic Illness:  Understanding of medical condition    Understanding of CPR, goals and expected outcomes, benefits and burdens discussed. Interventions Provided:  Other Treatment or Goals of Care Decisions: Would not want to purse anemia work up, any invasive procedures, goals are towards living her life as is without interventions (e.g. continue/discontinue dialysis; elect/forgo surgery)  Prolonged discussion on goals of care. Her wishes are for supportive therapy and not to undergo testing, workup, changes in her breathing regimens. Introduced Hospice philosphy. Patient and family are in agreement for hospice care. Coordination of care w/ hospice and referral sent today.

## 2019-07-28 NOTE — PROGRESS NOTES
Hospice Referral    Penelope Harris is a 82y.o. year old who is being referred to hospice after discussion with the patient and/or primary decision maker(s):   Primary Decision Maker: Espinoza Lloyd - Alycia - [phone]    Secondary Decision Maker: Jesusita Yousif - [phone]    The patient's principle diagnosis for hospice is suggested to be: End Stage COPD w/ exacerbation     As a result of this diagnosis, the patient has declined in health in the past: month    This is shown by the following signs/symptoms:  Increasing activity intolerance w/ transfers to [facility], increasing sob, wheezing, coughing, diminished appetite/poor po intake for 1 month, increasing BLE edema    Functionally, the patient's Palliative Performance Scale has declined from 50%to 30%. The patient is dependent on the following ADLs: ALL    Objective information that support this patients limited prognosis includes: clinical COPD exacerbation      Last chest CT  (Exam End: 5/17/2019 10:50)    Study Result     EXAM: CT ANGIOGRAPHY CHEST     FINDINGS:  LOWER NECK: The visualized portions of the thyroid and structures of the lower  neck are within normal limits. LUNGS: There is atelectasis at the lung bases, left greater than right. PLEURA: There is a left pleural effusion. PULMONARY ARTERIES: The pulmonary arteries are well enhanced and no pulmonary  emboli are identified. AORTA: The aorta is aneurysmal and tortuous with marked compression of the left  atrium. The ascending thoracic aorta measures 5.9 cm in diameter and the  descending thoracic aorta measures 4.5 cm in diameter. MEDIASTINUM: There is no mediastinal or hilar adenopathy or mass. BONES AND SOFT TISSUES: There are degenerative changes of the spine. UPPER ABDOMEN: There are surgical clips in the gallbladder fossa and the  gallbladder is absent. There is some contrast in the renal collecting systems.     IMPRESSION  IMPRESSION:   1. No pulmonary embolus.   2. Large thoracic aortic aneurysm with left atrial compression. 3. Left pleural effusion and lower lobe atelectasis. 4. Spondylosis.              Anemia (see trend below)       Ref.  Range 5/20/2019 02:17 6/24/2019 11:26 7/26/2019 11:38   WBC Latest Ref Range: 3.4 - 10.8 x10E3/uL 11.5 (H) 5.8 4.9   NRBC Latest Ref Range: 0  WBC 0.0     RBC Latest Ref Range: 3.77 - 5.28 x10E6/uL 3.67 (L) 3.03 (L) 3.11 (L)   HGB Latest Ref Range: 11.1 - 15.9 g/dL 11.1 (L) 8.5 (L) 8.1 (L)   HCT Latest Ref Range: 34.0 - 46.6 % 34.8 (L) 26.9 (L) 26.2 (L)   MCV Latest Ref Range: 79 - 97 fL 94.8 89 84   MCH Latest Ref Range: 26.6 - 33.0 pg 30.2 28.1 26.0 (L)   MCHC Latest Ref Range: 31.5 - 35.7 g/dL 31.9 31.6 30.9 (L)   RDW Latest Ref Range: 12.3 - 15.4 % 15.9 (H) 17.0 (H) 18.5 (H)   PLATELET Latest Ref Range: 150 - 450 x10E3/uL 152 192 208         Please contact Christina Walker [phone] for more information regarding this hospice referral.

## 2019-07-28 NOTE — PATIENT INSTRUCTIONS
Hospice: Care Instructions  Your Care Instructions  Hospice care provides medical treatment to relieve symptoms at the end of life. The goal is to keep you comfortable, not to try to cure you. Hospice care does not speed up or lengthen dying. It focuses on easing pain and other symptoms. Hospice caregivers want to enhance your quality of life. Hospice care also offers emotional help and spiritual support when you are dying. It also helps family members care for a loved one who is dying. Hospice care can help you review your life, say important things to family and friends, and explore spiritual issues. Hospice also helps your family and friends deal with their grief after you die. You can use hospice care if your illness cannot be cured and doctors believe you have no more than 6 months to live. You do not need to be confined to a bed or in a hospital to benefit from this type of care. The hospice team includes nurses, counselors, therapists, social workers, pastors, home health aides, and trained volunteers. You can get care in your own home or in a hospice center. Some hospice workers also go to nursing homes or hospitals. How can you care for yourself at home? · Prepare a list of advance directives. These are instructions to your doctor and family members about what kind of care you want if you become unable to speak or express yourself. · Find out if your health insurance covers hospice care. · Find hospice programs in your area. People who can help include your doctor, your state health department, and your insurance company. · Decide what kinds of hospice services you want. It helps to know what you want before you enter a hospice program.  · Think about some questions when preparing for hospice care. ? Who do you want to make decisions about your medical care if you are not able to? Many people choose their spouse, child, or doctor. ? What are you most afraid of that might happen?  You might be afraid of having pain or losing your independence. Let your hospice team know your fears. The team can help you deal with them. ? Where would you prefer to die? Choices include your home, a hospital, or a nursing home. ? Do you want to donate organs when you die? Make sure that your family clearly understands this. ? Do you want any Jewish rites or practices to be done before you die? Let your hospice team know what you want. Where can you learn more? Go to http://ruddy-anjali.info/. Enter P218 in the search box to learn more about \"Hospice: Care Instructions. \"  Current as of: April 1, 2019  Content Version: 12.1  © 1401-8744 [employer], [employer]. Care instructions adapted under license by [employer] (which disclaims liability or warranty for this information). If you have questions about a medical condition or this instruction, always ask your healthcare professional. [address] any warranty or liability for your use of this information.

## 2019-08-12 NOTE — TELEPHONE ENCOUNTER
Stephanie Bernard called to get status of patient going to [facility] or Palliative Home. Fabricio Duque will talk to daughter to do intake assessment call on what patient will need. Stephanie Bernard notified.   Nurse asked Brigid Buckley to please let Cezar Quiroz know where patient will be transitioning to from hospice

## 2019-08-12 NOTE — TELEPHONE ENCOUNTER
Spoke to [place] first about her mother's current status as well as to speak with Ms. Logan Mathur. [place] reports that her mother is \"grumpy today\". Shares that hospice nurse attempted visit today but patient declined visit. Spoke with Ms. Logan Mathur and she reports that she is unhappy with the hospice service as it is not the care that she expected. Inquired as to what her expectation would be. She shared that she would be more supportive than she is currently. She would like to return to [facility] and be able to go to the ER if needed. Again discussed hospice philosophy and symptom support. Ms. Logan Mathur reports that she is become significantly short of breath this afternoon, feels her heart is beating fast and she still has increased lower extremity swelling. Inquired if she had called hospice service to report the symptoms in which she had not. She has not used any of the symptom kit in the home. Strongly recommended a nurse visit for evaluation and treatment of her symptoms. Ms. Logan Mathur is open to visit in which I will assist in coordinating and notifying the hospice team of this need. Ms. Logan Mathur reports that she was not aware the nurse wanted to come today and was confused as to what the visit was for. She is still not clear of the services hospice offers. Spoke with [facility] @ Hospice. She will work with the team to schedule a visit today or early tomorrow.

## 2019-08-12 NOTE — TELEPHONE ENCOUNTER
[facility] Palliative Medicine Office  Nursing Note  [phone]  Fax [phone]     Name:  Duglas Walls  YOB: 1935     Received incoming email from [place], [place] with [address] on 8/7/19 stating pt wants to revoke hospice and return to the Home Based Primary Care program.  Pt states she would like the option of returning to the hospital if she would like to. Nurse sent email to pt's former provider with Kayy Smith NP and [facility] nurse Nickolas De La Rosa RN on 8/7/19 relaying above information to them and inquiring about pt being re-accepted to the [facility] program.           8/12/19  Kina Ewing discussed the re-referral with this nurse. Will speak with Dr. Dahiana Carrington about best fit, Palliative Home vs [facility] for this patient.      Tan Sesay, RAYMUNDON, RN  Clinical Referral Navigator

## 2019-08-15 PROBLEM — E44.0 MODERATE PROTEIN-CALORIE MALNUTRITION (HCC): Status: ACTIVE | Noted: 2019-01-01

## 2019-08-15 NOTE — PROGRESS NOTES
Home Based Primary Care [facility]) & Supportive Services    [phone]      NOTE: Home Based Primary Care is a PROVIDER (MD/NP) based interdisciplinary practice (RN, LCSW, Pharmacy, Dietician) for patient's who cannot access (or have a taxing effort) primary / speciality medical care in an office setting. [facility] is NOT [employer] but works with [address] when there is a skilled need. Our MD/NPs are integral in Care Transitions; PLEASE CALL [phone] if this patient arrives in the Emergency Department or Hospital.          Date of Current Visit: 08/15/19    Patient/Family present for Current Visit: daughterMoises of Care as stated by the patient/family is: To stay home and avoid going to the hospital if possible    Preferences for hospitalization if that were to be necessary:  [x] Patient DOES NOT WANT hospitalization; focus all treatments at home  [] Patient WOULD WANT hospitalization for potentially reversible causes  Patient prefers hospitalization at:      Is this encounter billed on time:YES    Total time: 40 min + 20min ACP   Counseling / coordination time: goals of care, coordination of care with hospice team and group meetind/dicussion held with both patient and daughter in the home, coordination of care with Yushino, General Sentiment edeme management w/ meds,elevation and tubigrip  > 50% counseling / coordination? YES:     ASSESSMENT & PLAN   Diagnoses and all orders for this visit:    1. Other emphysema ([address]  - Continues to smoke cigarettes. Down to about 1 cigarette a day  - Remains on continuous oxygen  2. Moderate protein-calorie malnutrition (HCC)  -Albumin 2.7  -Discussed increasing dietary intake of protein, daughter and patient are willing to try Boost supplement    3. Ascending aortic aneurysm (HCC)  -Not a surgical candidate    4. Descending aortic aneurysm ([address]  -Not a surgical candidate    5.  Dependent edema  -Continue Bumex as directed  -Elevate legs and discussed use of Tubigrip stockings  -Limit salt intake    6. Advance Care planning  -See ACP note    DISCUSSION:  Joint visit conducted today with hospice team to include Francisco Aldrich, Radha Ya and  Davin Larsen. Prolonged discussion held with patient as she has expressed her interest in returning to home-based primary care program and discharging from hospice. She is appreciative of the hospice care that has been provided to her over her brief time with the team however she admits that she is \"mentally not there yet\". Explored her concerns with hospice care in which they include the idea that hospice means eminent death. During this conversation she shared that she recently lost her son approximately 10 months ago in which she became visibly teary. She also shared an experience in which her  was intubated and  shortly after in which she expressed regrets and doing such as she felt to with and she would not want this for herself. Although Ms. Grimm's goals are more in line with the hospice philosophy and supporting her care with minimal treatment of her disease processes which she will return to home-based primary care program once we assure the appropriate DME supplies are in her home. This will include hospital bed, supplemental/continuous oxygen and bedside table. Patient is agreeable to maintain a supportive focus for her advanced COPD/Emphysema with the [facility] team.           DME Recommendations: Today we will place an order for a [facility] bed w/ a gel overlay due to altered sensory perception, impaired nutrition, compromised circulatory issues and urinary incontinence. A semi electric hospital bed should be elevated at all times 30 degrees to prevent aspirations, you also require the frequent changes in position that are not achieved by a standard bed, props or pillows.       Continuous supplemental O2 will be requested from your previous provider - [place] [id number] room air at rest___ 85 %____,  O2 room air Exercise _Unable to complete due to debility___  O2 __2.5L____ Liters placed at rest __95%__  O2  on exercises ___unable to complete due to debility___. Follow-up and Dispositions    · Return in about 2 weeks (around 2019). I have left a SUMMARY / INSTRUCTIONS from today's visit with the patient/family in the home    HEALTH MAINTENANCE     There are no preventive care reminders to display for this patient. CC & SUBJECTIVE including ROS & FUNCTION     Chief Complaint   Patient presents with    COPD    Advance Care Planning        Clarine Lesch is a 82 y.o. with chronic medical conditions as listed in the patient's updated Problem List (see below)    Their Subjective Information provided as today's visit includes:     Joint visit conducted today with hospice team to include Joana Decker and  Jett Ward. Prolonged discussion held with patient as she has expressed her interest in returning to home-based primary care program and discharging from hospice. She is appreciative of the hospice care that has been provided to her over her brief time with the team however she admits that she is \"mentally not there yet\". Explored her concerns with hospice care in which they include the idea that hospice means eminent death. During this conversation she shared that she recently lost her son approximately 10 months ago in which she became visibly teary. She also shared an experience in which her  was intubated and  shortly after in which she expressed regrets and doing such as she felt to with and she would not want this for herself. There have been no falls.     Positive ROS findings: Chronic shortness of breath, tobacco abuse, intermittent lower extremity edema    The following systems were [x] reviewed / [] unable to be reviewed  Systems: constitutional, ears/nose/mouth/throat, respiratory, gastrointestinal, genitourinary, musculoskeletal, integumentary, neurologic, psychiatric, endocrine. PPS:40-50%  Karnofsky:   Timed Up and Go (TUG): unable to complete  Fall Risk Assessment, last 12 mths 6/24/2019   Able to walk?  No   Fall in past 12 months? -       Current Durable Medical Equipment in Home:  [x] Hospital Bed  [x] Bedside Commode  [x] Wheelchair  [] Jeaneen Signs  [x] Walker  [x] Respiratory Support: Continuous oxygen, nebulizer    ADVANCE CARE PLANNING                                 The following information was updated I/ reviewed as accurate in Orders and the Advance Care Planning Navigator:  Durable DO NOT RESUSCITATE on file    Primary Decision MakerReriberto Byrd - Daughter - [phone]    Secondary Decision Maker: LUIS ANGEL WRIGHT - Son - [phone]  Advance Care Planning 8/15/2019   Patient's Healthcare Decision Maker is: Named in scanned ACP document   Confirm Advance Directive Yes, on file   Does the patient have other document types Do Not Resuscitate        VITAL SIGNS & PHYSICAL EXAM     Median Arm Circumference (inches):    35.2 cm ARIADNA on 7/26/19    Wt Readings from Last 3 Encounters:   07/29/19 197 lb (89.4 kg)   06/24/19 197 lb (89.4 kg)   05/20/19 194 lb 14.4 oz (88.4 kg)     Temp Readings from Last 3 Encounters:   08/05/19 98.5 °F (36.9 °C)   08/01/19 98.6 °F (37 °C)   07/26/19 97.8 °F (36.6 °C) (Temporal)     BP Readings from Last 3 Encounters:   08/15/19 140/60   08/15/19 140/60   08/12/19 140/70     Pulse Readings from Last 3 Encounters:   08/15/19 84   08/15/19 84   08/12/19 70       (Constitutional) Patient Physical Status:  female, advanced age, sitting upright in recliner chair, no acute distress    Pacemaker:N/A  ICD:N/A  Feeding Tube:N/A  Urine Catheter:N/A  Other:    Eyes: pupils equal, anicteric  ENMT: no nasal discharge, moist mucous membranes  Cardiovascular: regular rhythm, no M/R/G, distal pulses intact  Respiratory: Wearing nasal cannula, breathing mildly labored with conversation, symmetric,signifcant expiratory wheezes in all lung fields  Gastrointestinal: Round, obese abdomen, + bowel sounds, soft, non-tender, non-distended  Musculoskeletal: no deformity, no tenderness to palpation  Skin: warm, dry, no open wounds rashes or ulcerations. Dry flaky skin noted to upper and lower extremities.   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    MCI (mild cognitive impairment) G31.84    Shortness of breath R06.02    Acquired hypothyroidism E03.9    Other emphysema ([facility] [address] J43.8    Current smoker F17.200    Ascending aortic aneurysm (HCC) I71.2    Descending aortic aneurysm (HCC) I71.9    Diverticula of colon K57.30    Moderate protein-calorie malnutrition (HCC) E44.0      Family History   Problem Relation Age of Onset    Heart Disease Mother     Cancer Father         lung    Diabetes Father     Diabetes Son      Social History     Socioeconomic History    Marital status:      Spouse name: Not on file    Number of children: Not on file    Years of education: Not on file    Highest education level: Not on file   Tobacco Use    Smoking status: Current Every Day Smoker     Packs/day: 0.50     Years: 65.00     Pack years: 32.50    Smokeless tobacco: Never Used    Tobacco comment: currently about 1 cigarette a day   Substance and Sexual Activity    Alcohol use: No    Drug use: No     Types: Prescription, OTC        MEDICATIONS & PRESCRIPTIONS     Allergies   Allergen Reactions    Ciprofloxacin Other (comments)     \"high feeling\"    Codeine Other (comments)     hallucinations    Cortisone Other (comments)     Suicidal thoughts    Nsaids (Non-Steroidal Anti-Inflammatory Drug) Other (comments)     Ulcer history    Other Medication Palpitations     Iodine dye    Oxycodone Other (comments)     Makes pt feel \"high\"    Penicillins Other (comments)     Weak feeling    Vytorin 10-10 [Ezetimibe-Simvastatin] Other (comments)     Abdominal swelling    Zantac [Ranitidine Hcl] Other (comments)     Blisters        Current Outpatient Medications   Medication Sig    OXYGEN-AIR DELIVERY SYSTEMS 2 L by IntraNASal route continuous.  acetaminophen (TYLENOL) 325 mg tablet Take 650 mg by mouth every four (4) hours as needed (minor pain).  albuterol (PROVENTIL HFA, VENTOLIN HFA, PROAIR HFA) 90 mcg/actuation inhaler Take 2 Puffs by inhalation every four (4) hours as needed for Wheezing.  alclometasone (ACLOVATE) 0.05 % ointment Apply 1 g to affected area two (2) times a day.  aspirin delayed-release 81 mg tablet Take 81 mg by mouth daily.  levothyroxine (SYNTHROID) 50 mcg tablet Take 50 mcg by mouth Daily (before breakfast).  potassium chloride (K-DUR, KLOR-CON) 20 mEq tablet Take 20 mEq by mouth two (2) times a day.  ketoconazole (NIZORAL) 2 % topical cream Apply 1 Each to affected area two (2) times daily as needed for Skin Irritation. Apply a thin layer to reddened area in skin folds.  fluticasone propion-salmeterol (ADVAIR/WIXELA) 500-50 mcg/dose diskus inhaler Take 1 Puff by inhalation daily.  acetaminophen (TYLENOL) 650 mg suppository Insert 650 mg into rectum every six (6) hours as needed for Fever (if unable to take PO).  bisacodyl (DULCOLAX) 10 mg suppository Insert 10 mg into rectum daily as needed (no bowel movement for >3 days).  haloperidol (HALDOL) 2 mg/mL oral concentrate Take 0.5 mL by mouth four (4) times daily as needed (agitation).     hyoscyamine SL (LEVSIN/SL) 0.125 mg SL tablet Take 0.125 mg by mouth every four (4) hours as needed (secretions).  LORazepam (ATIVAN) 0.5 mg tablet Take 0.5 mg by mouth every six (6) hours as needed for Anxiety.  morphine (ROXANOL) 100 mg/5 mL (20 mg/mL) concentrated solution Take 0.25 mL by mouth every three (3) hours as needed (severe pain or shortness of breath).  prochlorperazine (COMPAZINE) 10 mg tablet Take 10 mg by mouth every six (6) hours as needed for Nausea.  bumetanide (BUMEX) 1 mg tablet Take 0.5 mg by mouth two (2) times a day.  senna (SENNA) 8.6 mg tablet Take 2 Tabs by mouth daily.  alclometasone (ACLOVATE) 0.05 % topical cream Apply  to affected area two (2) times a day. apply thin layer as directed to cancerous spot    fluticasone propion-salmeterol (ADVAIR/WIXELA) 500-50 mcg/dose diskus inhaler Take 1 Puff by inhalation every twelve (12) hours.  albuterol (ACCUNEB) 0.63 mg/3 mL nebulizer solution 0.63 mg by Nebulization route daily.  albuterol (PROVENTIL HFA, VENTOLIN HFA, PROAIR HFA) 90 mcg/actuation inhaler Take 2 Puffs by inhalation every four (4) hours as needed for Wheezing.  potassium chloride SR (K-TAB) 20 mEq tablet Take 1 Tab by mouth two (2) times a day.  acetaminophen (TYLENOL) 325 mg tablet Take 2 Tabs by mouth three (3) times daily as needed for Pain.  ketoconazole (NIZORAL) 2 % topical cream Apply  to affected area two (2) times a day.  inhalational spacing device (E-Z SPACER) 1 Each by Does Not Apply route as needed (for use of inhaler).  oxygen-air delivery systems (PV CLASSIC OXYGEN CONCENTRATOR) 2 L/min by Nasal route continuous.  OXYGEN-AIR DELIVERY SYSTEMS 2 L by Nasal route continuous. No current facility-administered medications for this visit. No orders of the defined types were placed in this encounter.         TEST DATA REVIEWED:     Lab Results   Component Value Date/Time    Hemoglobin A1c 4.5 05/19/2019 01:34 AM     No results found for: Courtney Tripathi MCA2, MCA3, MCAU, MCAU2, MCALPOCT  Lab Results   Component Value Date/Time    TSH 5.330 (H) 06/24/2019 11:26 AM     No results found for: Judi Sepulveda [id number]      Lab Results   Component Value Date/Time    WBC 4.9 07/26/2019 11:38 AM    HGB 8.1 (L) 07/26/2019 11:38 AM    PLATELET 954 30/69/7732 11:38 AM     Lab Results   Component Value Date/Time    Sodium 142 07/26/2019 11:38 AM    Potassium 3.3 (L) 07/26/2019 11:38 AM    Chloride 101 07/26/2019 11:38 AM    CO2 29 07/26/2019 11:38 AM    BUN 8 07/26/2019 11:38 AM    Creatinine 0.62 07/26/2019 11:38 AM    Calcium 8.2 (L) 07/26/2019 11:38 AM    Magnesium 1.9 05/20/2019 02:17 AM    Phosphorus 2.5 (L) 05/20/2019 02:17 AM      Lab Results   Component Value Date/Time    AST (SGOT) 27 07/26/2019 11:38 AM    Alk.  phosphatase 130 (H) 07/26/2019 11:38 AM    Protein, total 4.9 (L) 07/26/2019 11:38 AM    Albumin 2.7 (L) 07/26/2019 11:38 AM    Globulin 3.7 05/19/2019 01:34 AM     No results found for: IRON, FE, TIBC, IBCT, PSAT, FERR

## 2019-08-16 NOTE — ACP (ADVANCE CARE PLANNING)
Advance Care Planning (ACP) Provider Note - Comprehensive     Date of ACP Conversation: 08/15/19  Persons included in Conversation:  patient and family  Length of ACP Conversation in minutes:  20 minutes    Authorized Decision Maker (if patient is incapable of making informed decisions): This person is:  Healthcare Agent/Medical Power of  under Advance Directive      Primary Decision Maker: Roseanna Castillo - Daughter - [phone]    Secondary Decision Maker: LUIS ANGEL WRIGHT - Kyle - [phone]        General ACP for ALL Patients with Decision Making Capacity:   Importance of advance care planning, including choosing a healthcare agent to communicate patient's healthcare decisions if patient lost the ability to make decisions, such as after a sudden illness or accident    Review of Existing Advance Directive:  Does this advance directive still reflect your preferences? Yes (Provide new form/Refer for assistance in updating)    For Serious or Chronic Illness:  Understanding of medical condition    Understanding of CPR, goals and expected outcomes, benefits and burdens discussed. Explored potential for intubation should she have a COPD exacerbation or exacerbated respiratory illness which would lead to more invasive airway management. Patient verbalized she would NOT WANT to be intubated under any circumstance. . She shared that she watch this with her  and if she had to do it again she would not have done at. Interventions Provided:  Completed new Advance Directive  Other Treatment or Goals of Care Decisions: Ms. Tristan Schwartz would want to seek hospital care for reversible illnesses. She understands that she is not a candidate for surgical intervention due to her poor respiratory status.   She expressed that she would not want to be intubated and on a ventilator if indicated. (e.g. continue/discontinue dialysis; elect/forgo surgery)

## 2019-08-16 NOTE — PATIENT INSTRUCTIONS
Learning About Saving Energy When You Have a Chronic Condition  Introduction    Everyday tasks can be tiring when you have COPD, heart failure, or another long-term (chronic) condition. You may feel at times that you've lost your ability to live your life. But learning to conserve, or save, your energy can help you be less tired. Conserving your energy means finding ways of doing daily activities with as little effort as possible. With some small changes in the way you do things, you can get your tasks done more easily. Some treatments are available that might help. Pulmonary rehabilitation can teach you ways to breathe easier. Cardiac rehabilitation can help make your heart stronger. You also may want to see an occupational or physical therapist. The therapist can give you more tips on building strength and moving with less effort. What can you do to conserve your energy? Planning  · Make a list of what you have to do every day. Group the tasks by location. · Do all the chores in one part of your house around the same time. · Go out for errands or do chores at the time of day when you have the most energy. · Plan rest periods into your day. Getting things done  · Sit down as often as you can when you get dressed, do chores, or cook. · Use a cart with wheels to roll items, such as laundry, from one room to another. · Push or slide boxes or other large items instead of lifting them. Reaching and bending  · Put things you use the most on shelves that are at the level of your waist or shoulder. · Use long-handled grabbers or other tools to reach items on a high shelf or to  things off the floor. Use long-handled dusters when you clean the house. · Use a raised toilet seat to avoid bending too far to sit or stand up. Eating  · Eat several small meals instead of three larger meals. · If you get too tired to eat much, try to choose healthy foods that have more calories.  Have a yogurt-and-fruit smoothie for breakfast. Put avocado on a sandwich. Or add cheese or peanut butter to snacks. · If you don't feel very hungry, try to eat first and drink water or other fluids later, after a meal. This can help keep you from losing weight. Sip small amounts of fluids if you need to drink while you eat. Having sex  · Choose the time of day when you have more energy. · A [id number] position for sex can be less tiring. Sometimes you may want to focus more on caressing. Watch closely for changes in your health, and be sure to contact your doctor if you have any problems. Where can you learn more? Go to http://ruddy-anjali.info/. Enter H190 in the search box to learn more about \"Learning About Saving Energy When You Have a Chronic Condition. \"  Current as of: September 5, 2018  Content Version: 12.1  © 9822-0279 [employer]. Care instructions adapted under license by [employer] (which disclaims liability or warranty for this information). If you have questions about a medical condition or this instruction, always ask your healthcare professional. [address] any warranty or liability for your use of this information.

## 2019-09-04 NOTE — TELEPHONE ENCOUNTER
Patient's daughter called and would like to know when patient will be seen again. Per daughter patient has not been seen in about 3 weeks since being discharged from Hospice. Please call.

## 2019-09-18 NOTE — PROGRESS NOTES
[facility]  Home Based Primary Care & Supportive Services  Plan of Care    [facility] Team Members: Dr. Brayan Fry, Reena Cristina, NP; Karen Retana NP; Snow Zavala, RN; Chhaya Berger, MIKE, Cinthya Chavez RN, KEN Bryant; Ac Rai PSR    Clarine Lesch  1935 / [id number]  female    Date of Initial Visit (Start of Care): 6/17/19    Diagnosis:  COPD (on home O2), hypokalemia, CAD, Ascending thoracic aneurysm 5.9 cm,Descending thoracic aneurysm 4.5 cm (pt has not been referred to vascular surgery, per chart, pt is not a candidate for surgery given her age, comorbidities, and risk),  uncontrolled hypothyroidism (secondary to not taking meds), history of fluid retention, obesity    Patient status summary: Clarine Lesch is a 82 y.o. who was referred by [facility]. They have the following medical problems: COPD (on home O2), hypokalemia, CAD, ascending thoracic aneurysm 5.9 cm, descending thoracic aneurysm 4.5 cm (pt has not been referred to vascular surgery, per chart, pt is not a candidate for surgery given her age, comorbidities, and risk),  uncontrolled hypothyroidism (secondary to not taking meds), history of fluid retention, obesity.        Advance Care Planning:    Primary Decision Maker: Francia Escobedo - Daughter - [phone]    Secondary Decision Maker: LUIS ANGEL WRIGHT - Son - [phone]   Advance Care Planning 8/15/2019   Patient's Healthcare Decision Maker is: Named in scanned ACP document   Confirm Advance Directive Yes, on file   Does the patient have other document types Do Not Resuscitate       DME/Supplies:  [employer], Wheelchair, Bedside Commode and Oxygen       Allergies   Allergen Reactions    Ciprofloxacin Other (comments)     \"high feeling\"    Codeine Other (comments)     hallucinations    Cortisone Other (comments)     Suicidal thoughts    Nsaids (Non-Steroidal Anti-Inflammatory Drug) Other (comments)     Ulcer history    Other Medication Palpitations Iodine dye    Oxycodone Other (comments)     Makes pt feel \"high\"    Penicillins Other (comments)     Weak feeling    Vytorin 10-10 [Ezetimibe-Simvastatin] Other (comments)     Abdominal swelling    Zantac [Ranitidine Hcl] Other (comments)     Blisters         Nutritional Requirements:   Oral with supported meal preparation    Functional/Activity Level:  Ambulatory with cane and/or walker    DNR    Safety Measures:   Fall risk and Oxygen use    Current Outpatient Medications   Medication Sig    albuterol (ACCUNEB) 0.63 mg/3 mL nebulizer solution 3 mL by Nebulization route three (3) times daily as needed for Wheezing for up to 90 days.  OXYGEN-AIR DELIVERY SYSTEMS 2 L by IntraNASal route continuous.  acetaminophen (TYLENOL) 325 mg tablet Take 650 mg by mouth every four (4) hours as needed (minor pain).  albuterol (PROVENTIL HFA, VENTOLIN HFA, PROAIR HFA) 90 mcg/actuation inhaler Take 2 Puffs by inhalation every four (4) hours as needed for Wheezing.  alclometasone (ACLOVATE) 0.05 % ointment Apply 1 g to affected area two (2) times a day.  aspirin delayed-release 81 mg tablet Take 81 mg by mouth daily.  levothyroxine (SYNTHROID) 50 mcg tablet Take 50 mcg by mouth Daily (before breakfast).  potassium chloride (K-DUR, KLOR-CON) 20 mEq tablet Take 20 mEq by mouth two (2) times a day.  ketoconazole (NIZORAL) 2 % topical cream Apply 1 Each to affected area two (2) times daily as needed for Skin Irritation. Apply a thin layer to reddened area in skin folds.  fluticasone propion-salmeterol (ADVAIR/WIXELA) 500-50 mcg/dose diskus inhaler Take 1 Puff by inhalation daily.  acetaminophen (TYLENOL) 650 mg suppository Insert 650 mg into rectum every six (6) hours as needed for Fever (if unable to take PO).  bisacodyl (DULCOLAX) 10 mg suppository Insert 10 mg into rectum daily as needed (no bowel movement for >3 days).     haloperidol (HALDOL) 2 mg/mL oral concentrate Take 0.5 mL by mouth four (4) times daily as needed (agitation).  hyoscyamine SL (LEVSIN/SL) 0.125 mg SL tablet Take 0.125 mg by mouth every four (4) hours as needed (secretions).  LORazepam (ATIVAN) 0.5 mg tablet Take 0.5 mg by mouth every six (6) hours as needed for Anxiety.  morphine (ROXANOL) 100 mg/5 mL (20 mg/mL) concentrated solution Take 0.25 mL by mouth every three (3) hours as needed (severe pain or shortness of breath).  prochlorperazine (COMPAZINE) 10 mg tablet Take 10 mg by mouth every six (6) hours as needed for Nausea.  bumetanide (BUMEX) 1 mg tablet Take 0.5 mg by mouth two (2) times a day.  senna (SENNA) 8.6 mg tablet Take 2 Tabs by mouth daily.  alclometasone (ACLOVATE) 0.05 % topical cream Apply  to affected area two (2) times a day. apply thin layer as directed to cancerous spot    fluticasone propion-salmeterol (ADVAIR/WIXELA) 500-50 mcg/dose diskus inhaler Take 1 Puff by inhalation every twelve (12) hours.  albuterol (PROVENTIL HFA, VENTOLIN HFA, PROAIR HFA) 90 mcg/actuation inhaler Take 2 Puffs by inhalation every four (4) hours as needed for Wheezing.  potassium chloride SR (K-TAB) 20 mEq tablet Take 1 Tab by mouth two (2) times a day.  acetaminophen (TYLENOL) 325 mg tablet Take 2 Tabs by mouth three (3) times daily as needed for Pain.  ketoconazole (NIZORAL) 2 % topical cream Apply  to affected area two (2) times a day.  inhalational spacing device (E-Z SPACER) 1 Each by Does Not Apply route as needed (for use of inhaler).  oxygen-air delivery systems (PV CLASSIC OXYGEN CONCENTRATOR) 2 L/min by Nasal route continuous.  OXYGEN-AIR DELIVERY SYSTEMS 2 L by Nasal route continuous. No current facility-administered medications for this visit. The Plan of Care has been initiated for within 15 days of New Visit  and reviewed and updated by the Interdisciplinary Group (IDG) as frequently as the patient condition warrants. Plan of Care by Discipline:    1. Provider  Ongoing evaluation of treatment interventions for specific disease state, Assessment of medication adverse effects, Reduction of polypharmacy within benefit/burden framework appropriate to age, function and disease state, Determine need for laboratory assessment based on patient disease status , Assess results of laboratory testing and adjust treatment plan as appropriate, Communicate with prior/current health care team members to enhance understanding of patient status, Assess current Advance Care Planning documents and make ACP recommendations as appropriate, Discuss goals of care and treatment preferences, including resuscitation and Assess patient for transition to Hospice as medically indicated    2. Nursing  Communicate with prior/current health care team members to enhance understanding of patient status, Coordination of care with specialty services, Review Health Maintenance with patient and provider; update as appropriate, Education for safety; falls and Education for safety; fire safety    3. Social Work  Introduce Home Based Primary Care and Supportive Services, New patient assessment of psychosocial needs and social determinants of health and Review Emergency Preparedness Plan    4. Other    Plan of Care Orders / Action Items:    1. Continued education on dangers of smoking with oxygen. 2. Increase protein for low albumin levels  3.  Tubigrip stocking recommended for dependent edema as well as low salt diet    Estimated Visit Frequency:  Monthly Provider Visit, Every 3 month RN Visit and Every 3 month SW Visit

## 2019-09-24 PROBLEM — D64.9 ANEMIA: Status: ACTIVE | Noted: 2019-01-01

## 2019-09-24 NOTE — PROGRESS NOTES
Spoke directly to patient regarding labs. She wishes to reverse what is reversible. Reviewed CXR, EKG, and abnormal lab results She will proceed to ER for eval and possible transfusion. Patient verbalized understanding of recommendations and will call 911 to take her to ER. Attempted to contact daughter, [place], but telephone rang several times then disconnected.

## 2019-09-24 NOTE — PROGRESS NOTES
Home Based Primary Care [facility]) & Supportive Services   
[phone] NOTE: Home Based Primary Care is a PROVIDER (MD/NP) based interdisciplinary practice (RN, LCSW, Pharmacy, Dietician) for patient's who cannot access (or have a taxing effort) primary / speciality medical care in an office setting. [facility] is NOT [employer] but works with [employer] when there is a skilled need. Our MD/NPs are integral in Care Transitions; PLEASE CALL [phone] if this patient arrives in the Emergency Department or Hospital.  
  
 
 
Date of Current Visit: 9/24/19 Patient/Family present for Current Visit: daughter, [place] Goals of Care as stated by the patient/family is: To stay home and avoid going to the hospital if possible Preferences for hospitalization if that were to be necessary: 
[x] Patient DOES NOT WANT hospitalization; focus all treatments at home 
[] Patient WOULD WANT hospitalization for potentially reversible causes Patient prefers hospitalization at:   
 
Is this encounter billed on time:YES Total time:  
Counseling / coordination time:  
> 50% counseling / coordination? ASSESSMENT & PLAN Diagnoses and all orders for this visit: 1. Ascending aortic aneurysm (HCC) & Descending aortic aneurysm (HCC)  
-     EKG, 12 LEAD, INITIAL; Future -     XR CHEST PORT; Future - Previously deemed a poor surgical candidate by CV surgery - Due to the new arrhthymias, will start work up for this new finding. 2. Other emphysema ([facility] [address] -     XR CHEST PORT; Future - Encourage smoking cessation as she continues to smoke approximately 1 cigarette daily - Remains depending on supplemental O2 
- Unfortunately, she is not medically maximized. This has been her preference despite education and recommendations. Only utilizing albuterol nebulizers. 3. Hypokalemia 
-     EKG, 12 LEAD, INITIAL; Future -     CBC WITH AUTOMATED DIFF 
-     METABOLIC PANEL, COMPREHENSIVE 
 -     MAGNESIUM 
- Due to new arrhythmia, will check labs and EKG 4. Current smoker -     XR CHEST PORT; Future 5. Cardiac arrhythmia, unspecified cardiac arrhythmia type 
-     EKG, 12 LEAD, INITIAL; Future -     XR CHEST PORT; Future -     CBC WITH AUTOMATED DIFF 
-     METABOLIC PANEL, COMPREHENSIVE 
-     MAGNESIUM 
-     NT-PRO BNP 
-     COLLECTION VENOUS BLOOD,VENIPUNCTURE 
- New on exam. Work up in progress 6. Moderate protein-calorie malnutrition ([address] -     METABOLIC PANEL, COMPREHENSIVE 
- Trends reviewed. Labs to follow up current status in the setting of significant edema and decreased po intake Ref. Range 6/24/2019 11:26 7/26/2019 11:38 9/24/2019 12:12 Albumin Latest Ref Range: 3.5 - 4.7 g/dL 2.5 (L) 2.7 (L) 2.7 (L) 7. Localized edema -     METABOLIC PANEL, COMPREHENSIVE 
-     MAGNESIUM 
-     NT-PRO BNP 
- As noted above in #6 8. Costochondritis - Suspected costochondritis - Recommending Tylenol at this point - Avoid NSAIDs due CKD Follow-up and Dispositions · Return in about 1 week (around 10/1/2019). I have left a SUMMARY / INSTRUCTIONS from today's visit with the patient/family in the home HEALTH MAINTENANCE There are no preventive care reminders to display for this patient. CC & SUBJECTIVE including ROS & FUNCTION Chief Complaint Patient presents with  COPD  Rib Pain  Leg Swelling Myra Thao is a 82 y.o. with chronic medical conditions as listed in the patient's updated Problem List (see below) Their Subjective Information provided as today's visit includes:  
Briefly admittted to Hospice services but was discharged due to patient wishes to return to [facility] program. Today she reports a 3-4 day hx of left chest pain, beneath the breast that attribues to gas however it is unrelieved when she chews gum or takes tums.  She is able to point and pressure the site beneath her breast that bothers her and has reproducible pain to touch. + Cough. Depending on supplemental O2. Her O2 needs have not increased. She is only utilizing her albuterol nebs as this is her preference. Smoking approximately 1 cigarette a day. Approximately 36 hours ago, she felt bad all day and vomited x1. Describes output as \"water\". Overall her appetite is diminished but this is not new for her according to the patient and daughter. C/o increasing BLE edema. New bilateral elbow edema also concerning to the patient. There have been no falls. Positive ROS findings: Chronic shortness of breath, tobacco abuse, intermittent lower extremity edema The following systems were [x] reviewed / [] unable to be reviewed Systems: constitutional, ears/nose/mouth/throat, respiratory, gastrointestinal, genitourinary, musculoskeletal, integumentary, neurologic, psychiatric, endocrine. PPS:40-50% Karnofsky:  
Timed Up and Go (TUG): unable to complete Fall Risk Assessment, last 12 mths 6/24/2019 Able to walk? No  
Fall in past 12 months? -  
 
 
Current Durable Medical Equipment in Home: 
[x] Hospital Bed 
[x] Bedside Commode 
[x] Wheelchair 
[] Grey Avila 
[x] Walker 
[x] Respiratory Support: Continuous oxygen, nebulizer ADVANCE CARE PLANNING The following information was updated I/ reviewed as accurate in Orders and the Advance Care Planning Navigator: Durable DO NOT RESUSCITATE on file Primary Decision MakerSidra Cox - Daughter - [phone] Secondary Decision Maker: LUIS ANGEL WRIGHT - Son - [phone] Advance Care Planning 8/15/2019 Patient's Healthcare Decision Maker is: Named in scanned ACP document Confirm Advance Directive Yes, on file Does the patient have other document types Do Not Resuscitate VITAL SIGNS & PHYSICAL EXAM  
 
Median Arm Circumference (inches): 
 
35.2 cm ARIADNA on 7/26/19 Wt Readings from Last 3 Encounters:  
09/25/19 196 lb 3.4 oz (89 kg) 07/29/19 197 lb (89.4 kg) 06/24/19 197 lb (89.4 kg) Temp Readings from Last 3 Encounters:  
09/25/19 98 °F (36.7 °C)  
09/24/19 98.9 °F (37.2 °C) (Temporal) 08/05/19 98.5 °F (36.9 °C) BP Readings from Last 3 Encounters:  
09/25/19 110/48  
09/24/19 124/62  
08/15/19 140/60 Pulse Readings from Last 3 Encounters:  
09/25/19 86  
09/24/19 71  
08/15/19 84 (Constitutional) Patient Physical Status:  female, advanced age, sitting upright in recliner chair, no acute distress Pacemaker:N/A 
ICD:N/A Feeding Tube:N/A 
Urine Catheter:N/A Other: 
 
Eyes: pupils equal, anicteric ENMT: no nasal discharge, moist mucous membranes Cardiovascular: regular rhythm, no M/R/G, dependent edema to bilat. Elbows, unable to palpate pedal pulses due to significant pedal edema, +3 BLE edema Respiratory: Wearing nasal cannula, breathing mildly labored with conversation, symmetric,signifcant expiratory wheezes in all lung fields Gastrointestinal: Round, obese abdomen, + bowel sounds, soft, non-tender, non-distended Musculoskeletal: no deformity, no tenderness to palpation Skin: warm, dry, no open wounds rashes or ulcerations. Dry flaky skin noted to upper and lower extremities. Neurologic: A/Ox3 however short-term memory appears to be impaired, following commands, moving all extremities equally Psychiatric: normal affect, no hallucinations Other: OXYGEN TESTING: 
O2 qualification:  
Pt visibly SOB at rest and with talking O2 sat 85% RA , patient is unable to ambulate After placement of O2 at 2.5L for 3 min Sats increased to 95% PROBLEM LIST / PAST MEDICAL / SOCIAL HX Patient Active Problem List  
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  
 Acquired hypothyroidism E03.9  Other emphysema ([facility] [address] J43.8  Current smoker F17.200  Ascending aortic aneurysm (HCC) I71.2  Descending aortic aneurysm (HCC) I71.9  Diverticula of colon K57.30  Moderate protein-calorie malnutrition (HCC) E44.0  Anemia D64.9 Family History Problem Relation Age of Onset  Heart Disease Mother  Cancer Father   
     lung  Diabetes Father  Diabetes Son   
 
Social History Socioeconomic History  Marital status:  Spouse name: Not on file  Number of children: Not on file  Years of education: Not on file  Highest education level: Not on file Tobacco Use  Smoking status: Current Every Day Smoker Packs/day: 0.50 Years: 65.00 Pack years: 32.50  Smokeless tobacco: Never Used  Tobacco comment: currently about 1 cigarette a day Substance and Sexual Activity  Alcohol use: No  
 Drug use: No  
  Types: Prescription, OTC MEDICATIONS & PRESCRIPTIONS Allergies Allergen Reactions  Ciprofloxacin Other (comments) \"high feeling\"  Codeine Other (comments)  
  hallucinations  Cortisone Other (comments) Suicidal thoughts  Nsaids (Non-Steroidal Anti-Inflammatory Drug) Other (comments) Ulcer history  Other Medication Palpitations Iodine dye  Oxycodone Other (comments) Makes pt feel \"high\"  Penicillins Other (comments) Weak feeling  Vytorin 10-10 [Ezetimibe-Simvastatin] Other (comments) Abdominal swelling  Zantac [Ranitidine Hcl] Other (comments) Blisters Current Outpatient Medications Medication Sig  
 albuterol (ACCUNEB) 0.63 mg/3 mL nebulizer solution 3 mL by Nebulization route three (3) times daily as needed for Wheezing for up to 90 days.  aspirin delayed-release 81 mg tablet Take 81 mg by mouth daily.  alclometasone (ACLOVATE) 0.05 % topical cream Apply  to affected area two (2) times a day. apply thin layer as directed to cancerous spot  albuterol (PROVENTIL HFA, VENTOLIN HFA, PROAIR HFA) 90 mcg/actuation inhaler Take 2 Puffs by inhalation every four (4) hours as needed for Wheezing.  ketoconazole (NIZORAL) 2 % topical cream Apply  to affected area two (2) times a day.  potassium chloride SR (K-TAB) 20 mEq tablet Take 20 mEq by mouth daily.  levothyroxine (SYNTHROID) 50 mcg tablet Take 50 mcg by mouth Daily (before breakfast).  bumetanide (BUMEX) 1 mg tablet Take 0.5 Tabs by mouth two (2) times a day.  pantoprazole (PROTONIX) 40 mg tablet Take 1 Tab by mouth two (2) times a day.  magnesium oxide (MAG-OX) 400 mg tablet Take 1 Tab by mouth daily.  ferrous sulfate 325 mg (65 mg iron) tablet Take 1 Tab by mouth three (3) times daily (with meals).  senna (SENNA) 8.6 mg tablet Take 2 Tabs by mouth daily. No current facility-administered medications for this visit. No orders of the defined types were placed in this encounter. TEST DATA REVIEWED:  
 
Lab Results Component Value Date/Time Hemoglobin A1c 4.5 05/19/2019 01:34 AM  
 
No results found for: P[place], MCA2, MCA3, MCAU, MCAU2, MCALPOCT Lab Results Component Value Date/Time TSH 5.330 (H) 06/24/2019 11:26 AM  
 
No results found for: Panchito Ballesteros, Laury Grimes, Theodore Villanueva, [id number] Lab Results Component Value Date/Time WBC 9.0 09/25/2019 03:41 AM  
 HGB 7.4 (L) 09/25/2019 03:41 AM  
 PLATELET 589 26/42/6688 03:41 AM  
 
Lab Results Component Value Date/Time Sodium 141 09/25/2019 03:41 AM  
 Potassium 3.8 09/25/2019 03:41 AM  
 Chloride 104 09/25/2019 03:41 AM  
 CO2 34 (H) 09/25/2019 03:41 AM  
 BUN 12 09/25/2019 03:41 AM  
 Creatinine 0.75 09/25/2019 03:41 AM  
 Calcium 7.5 (L) 09/25/2019 03:41 AM  
 Magnesium 1.9 09/25/2019 03:41 AM  
 Phosphorus 2.2 (L) 09/24/2019 11:42 PM  
  
Lab Results Component Value Date/Time AST (SGOT) 20 09/24/2019 08:24 PM  
 Alk.  phosphatase 129 (H) 09/24/2019 08:24 PM  
 Protein, total 6.0 (L) 09/24/2019 08:24 PM  
 Albumin 2.4 (L) 09/24/2019 08:24 PM  
 Globulin 3.6 09/24/2019 08:24 PM  
 
Lab Results Component Value Date/Time  Iron 16 (L) 09/24/2019 11:42 PM  
 TIBC 296 09/24/2019 11:42 PM  
 Iron % saturation 5 (L) 09/24/2019 11:42 PM  
 Ferritin 28 09/24/2019 11:42 PM

## 2019-09-25 NOTE — CONSULTS
Consult acknowledged. After review of the CTA the patient has an ascending thoracic aneurysm. Patient would best be served with a consult from cardio-thoracic surgery. Vascular surgery signing off. We appreciate the opportunity to participate in the care of Ms. Wong Ron. Please reconsult as needed.

## 2019-09-25 NOTE — PROGRESS NOTES
Reason for Admission:   Anemia, dyspnea RRAT Score:          10 low risk Plan for utilizing home health:      Currently using [facility] Current Advanced Directive/Advance Care Plan: on file 6/2019 Transition of Care Plan:                   
 
1  Patient in ED bed waiting for inpatient admission 2. Patient will need 2nd IM letter at discharge 3. Patient prefers to discharge home with her daughter's assistance and follow up appointments. Patient does not feel she needs more assistance at home at this time. Patient is a 82year old female admitted 9/25 for anemia and dyspnea. Patient alert and oriented, resting in bed, no visitors present in room. Demographic information verified and correct. Insurance information verified and correct. Patient's daughter and son live with her and her daughter services as her primary caregiver. Patient uses 2.5 L oxygen continuous, patient states she thinks her oxygen comes from [address]. Patient has a walker and WC but is currently unable to walk. Patient is currently using [facility]. Patient uses [address] in [facility] Patient states her daughter assists with all ADLs. Patient needs ambulance transport as she is non-mobile. 1550:  Patient now being discharged home. Confirmed with patient's daughter that needs are adequately met at home, [facility] still in place, patient's daughter serves as caregiver. [facility] transport arranged by phone, should arrive by [phone]. PCS sheet filled out for transport and placed on patient's chart. Primary nurse notified. Care Management Interventions PCP Verified by CM: Maddison Andujar NP) Mode of Transport at Discharge: Other (see comment)(pt. uses ambulance transport) Transition of Care Consult (CM Consult): Discharge Planning Discharge Durable Medical Equipment: No 
Physical Therapy Consult: No 
Occupational Therapy Consult: No 
Speech Therapy Consult: No 
 Current Support Network: Own Home(lives with daughter and son, 1 story house, outside ramp) Confirm Follow Up Transport: Other (see comment) Plan discussed with Pt/Family/Caregiver: Yes Discharge Location Discharge Placement: Home Nomi Wilhelm RN, BSN [facility] Management [phone]

## 2019-09-25 NOTE — PROGRESS NOTES
Pharmacy Medication Reconciliation The patient's daughter Taurus Sampson was interviewed regarding current PTA medication list, use and drug allergies. The patient's daughter was questioned regarding use of any other inhalers, topical products, over the counter medications, herbal medications, vitamin products or ophthalmic/nasal/otic medication use. Daughter gives patient her medications. Sources: daughter and Rx Query Allergy Update: Ciprofloxacin; Codeine; Cortisone; Nsaids (non-steroidal anti-inflammatory drug); Other medication; Oxycodone; Penicillins; Vytorin 10-10 [ezetimibe-simvastatin]; and Zantac [ranitidine hcl]-confirmed Recommendations/Findings: The following amendments were made to the patient's active medication list on file at [id number]:  
 
1) Additions: 
levothyroxine 2) Deletions: APAP (all formulations) Bisacodyl Advair Haloperidol Hyoscyamine Lorazepam 
Morphine Prochlorperazine 3) Changes:  
K-Tab 20mEq from BID to daily Bumex 0.5mg from BID to daily 4) Pertinent Pharmacy Findings: 
Per daughter: 
-APAP does not work for patient 
-patient uses albuterol nebulizer twice a day most days, but sometimes uses it three times a day 
-the patient had consistent vomiting for 24 hours when the patient tried [employer], so she has not tried the Advair yet because she is afraid of getting sick. 
-the following medications are in her hospice box: haloperidol, hyoscyamine, lorazepam, morphine and prochlorperazine, but none of them have been taken to date. 
-the patient is unable to sleep with bumetanide BID, she was asked to take 1 mg at once in the morning, but she didn't like it. She will only take 0.5mg daily (in the morning.) 
 
 
-Clarified PTA med list with daughter. PTA medication list was corrected to the following:  
 
Prior to Admission Medications Prescriptions Last Dose Informant Patient Reported? Taking? albuterol (ACCUNEB) 0.63 mg/3 mL nebulizer solution 9/24/2019 at AM Child No Yes Sig: 3 mL by Nebulization route three (3) times daily as needed for Wheezing for up to 90 days. albuterol (PROVENTIL HFA, VENTOLIN HFA, PROAIR HFA) 90 mcg/actuation inhaler 9/24/2019 at AM Child No Yes Sig: Take 2 Puffs by inhalation every four (4) hours as needed for Wheezing. alclometasone (ACLOVATE) 0.05 % topical cream 9/18/2019 at Unknown time Child No Yes Sig: Apply  to affected area two (2) times a day. apply thin layer as directed to cancerous spot  
aspirin delayed-release 81 mg tablet 9/24/2019 at AM Child Yes Yes Sig: Take 81 mg by mouth daily. bumetanide (BUMEX) 1 mg tablet 9/24/2019 at AM Child Yes Yes Sig: Take 0.5 mg by mouth daily. ketoconazole (NIZORAL) 2 % topical cream 9/23/2019 at AM Child No No  
Sig: Apply  to affected area two (2) times a day. levothyroxine (SYNTHROID) 50 mcg tablet 9/24/2019 at AM Child Yes Yes Sig: Take 50 mcg by mouth Daily (before breakfast). potassium chloride SR (K-TAB) 20 mEq tablet 9/24/2019 at AM Child Yes Yes Sig: Take 20 mEq by mouth daily. senna (SENNA) 8.6 mg tablet 9/22/2019 Child Yes No  
Sig: Take 2 Tabs by mouth daily. Facility-Administered Medications: None Thank you, 
Estephania Slade, [address] [facility] pharmacy student

## 2019-09-25 NOTE — CONSULTS
Gastroenterology Attending Physician Rut Aguero) attestation statement and comments. This patient was seen and examined by me in a face-to-face visit today. I reviewed the medical record including lab work, imaging and other provider notes. I confirmed the history as described above. I spoke to the patient, reviewed the medical record including lab work, imaging and other provider notes. I discussed this case in detail with JASON Brown NP. I formulated an  assessment of this patient and developed a treatment plan. I agree with the above consultation note. I agree with the history, exam and assessment and plan as outlined in the note. I would like to add the followinyo F with COPD, recently discarding her hospice status, and indicating no desire to do procedures, who has had anemia since 2019 managed with Fe IV/supplements, who is noted to hve Hgb 7.4, MCV 74.3, low %sat, Fe low, nl TIBC, without overt GI bleeding. Radiograph notes cirrhotic morphology with nl Liver Enzymes and nl platelets. No ascites or liver mass noted, although Chest Ct notes possible pulmonary nodules. Suspect sx related to anemia of chronic disease. She is at risk for chronic GI blood loss from GI tract, but has declined prior investigation of same. Plan: 
1) Supplement with Venoferand PRBC 2) Avoid ASA/NSAIDs 3) Offer daily PPI 4) She can be followed as OP with consideration for EGd and colonoscopy then. Pt can be reached at [phone] or her [facility]'s ([facility]) cell [phone]. No active GI intervention planned at present as this is an OP w/u. Will sign off Rudi Senior MD 
         
GI CONSULTATION NOTE Kaya Cutler NP 
[phone] NP in-hospital cell phone M-F until 4:30 After 5pm or on weekends, please call  for physician on call NAME: Heath Wheat : 1935  MRN: [id number] Attending: Dr. Brenna Ackerman  PCP: Dr. Kenia Hoskins Primary GI: Dr. Montrell Munoz 
Date/Time:  2019 11:59 AM 
Assessment: -Anemia, occurring since June 
· H&H 7.4 &24.3, MCV 74.3 · Iron 16L, TIBC 296, %sat 5L, ferritin 28 · No overt S&S of GI bleed, could likely be r/t chronic disease · Hemodynamically stable with no noted fevers 
-Cirrhosis, compensated. Non-emergent finding, likely chronic issue · plt WNL, INR WNL, LFTs WNL · No ascites or jaundice 
-Bilat lower edema, chronic condition, was on bumex and limited salt intake at home 
-COPD, emphysema, current smoker, home O2, ascending and descending aortic aneurysms Plan:  
-Conservative management at this time. Patient is fragile with multiple co-morbidities. No overt S&S of GI bleed at this time. Patient will be better served with conservative therapy. 
-Supportive measures as clinically indicated. -Serial CBC as clinically indicated for hemoglobin trending. Goal hemoglobin >7. 
-Avoid NSAIDs 
-PPI 40mg BID 
-Agree with IV Iron supplementation 
-Patient can follow up as an outpatient with GI if she would like for further workup for cirrhosis. Would give patient option as she has care planning goals that suggest she may not be open to extensive workup for cirrhosis. -Will sign off for now. GI has nothing further to add to plan of care. Please contact Gi with any further questions or concerns. Thank you! Plan discussed with Dr. Daniels Him Subjective:  
 
HISTORY OF PRESENT ILLNESS:    
Talita Eduardo is an 82 y.o.  female who we are asked to see for complaint of cirrhosis, anemia. Patient reports she came to the ER because she was told she was anemic? She reports she has been anemic for awhile now and no one knows why. Denies any abdominal pain. Had two days of N/V a few days ago prior to admission. Denies any hematemesis or coffee ground emesis. No current issues with acid reflux. No pain or trouble swallowing. Will use BC powder about once per month. Has regular soft formed bowel movements daily to every other day. Occasional straining.  No hematochezia or melena. No family history of any GI illnesses/cancers. Did have part of colon removed many years ago for recurrent diverticulitis. Unsure when last colonoscopy was, thinks over 10 years ago. Doesn't remember when last EGD was. Reports she does not want any surgeries or procedures. \"At her age it would be silly to do. \" S/p choley. Was recently a hospice patient, however was recently discharged per her choice. [place] Procedural History 
-05/2013 ERCP with Dr. Cinda Ventura, noted to have normal esophagus. Dilated CBD to 15mm with no evidence of retained debris, stones, or sludge.  
-01/2013 EGD with Dr. Elizabeth Zhong for dysphagia, esophagus is normal. Stomach with 5mm ulcer. Duodenum normal. Esophageal dilation with 20mm sized balloon. Past Medical History:  
Diagnosis Date  Abdominal pain   
 nausea, bloating, frequent indigestion, ulcers  Arthritis  Cancer (HCC)   
 squamous cell - leg  Chronic pain  COPD  Dyspepsia and other specified disorders of function of stomach  Easy bruising  Hypercholesterolemia  Osteoporosis  Other ill-defined conditions(799.89)   
 blood transfusion with miscarriage  Other ill-defined conditions(799.89)   
 gout  Other ill-defined conditions(799.89) 1/13  
 hematuria, being evaluated by Dr Yamilka Castle  Thyroid disease Past Surgical History:  
Procedure Laterality Date  ABDOMEN SURGERY PROC UNLISTED    
 colon resection  HX APPENDECTOMY  HX BREAST BIOPSY    
 bilateral  
 HX CHOLECYSTECTOMY  11/26/12  
 - LAPAROSOCPIC CHOLECYSTECTOMY WITH GRAMS POSSIBLE OPEN  
 HX DILATION AND CURETTAGE    
 HX GI    
 colon surgery [address]  
 hysterectomy-right ovary removed  HX HEART CATHETERIZATION    
 HX HEENT    
 tonsillectomy  HX ORTHOPAEDIC    
 carpal tunnel-left  CT EGD BALLOON DILATION ESOPHAGUS <30 MM DIAM  1/21/2013  CT EGD TRANSORAL BIOPSY SINGLE/MULTIPLE  1/21/2013  [place] ERCP W/SPHINCTEROTOMY/PAPILLOTOMY  5/10/2013 Social History Tobacco Use  Smoking status: Current Every Day Smoker Packs/day: 0.50 Years: 65.00 Pack years: 32.50  Smokeless tobacco: Never Used  Tobacco comment: currently about 1 cigarette a day Substance Use Topics  Alcohol use: No  
  
Family History Problem Relation Age of Onset  Heart Disease Mother  Cancer Father   
     lung  Diabetes Father  Diabetes Son Allergies Allergen Reactions  Ciprofloxacin Other (comments) \"high feeling\"  Codeine Other (comments)  
  hallucinations  Cortisone Other (comments) Suicidal thoughts  Nsaids (Non-Steroidal Anti-Inflammatory Drug) Other (comments) Ulcer history  Other Medication Palpitations Iodine dye  Oxycodone Other (comments) Makes pt feel \"high\"  Penicillins Other (comments) Weak feeling  Vytorin 10-10 [Ezetimibe-Simvastatin] Other (comments) Abdominal swelling  Zantac [Ranitidine Hcl] Other (comments) Blisters Prior to Admission medications Medication Sig Start Date End Date Taking? Authorizing Provider  
albuterol (ACCUNEB) 0.63 mg/3 mL nebulizer solution 3 mL by Nebulization route three (3) times daily as needed for Wheezing for up to 90 days. 8/20/19 11/18/19  Natacha Haque, PRASANNA  
acetaminophen (TYLENOL) 325 mg tablet Take 650 mg by mouth every four (4) hours as needed (minor pain). 7/29/19   Provider, Historical  
aspirin delayed-release 81 mg tablet Take 81 mg by mouth daily. 7/29/19   Provider, Historical  
acetaminophen (TYLENOL) 650 mg suppository Insert 650 mg into rectum every six (6) hours as needed for Fever (if unable to take PO). 7/29/19   Provider, Historical  
bisacodyl (DULCOLAX) 10 mg suppository Insert 10 mg into rectum daily as needed (no bowel movement for >3 days).  7/29/19   Provider, Historical  
 haloperidol (HALDOL) 2 mg/mL oral concentrate Take 0.5 mL by mouth four (4) times daily as needed (agitation). 7/29/19   Provider, Historical  
hyoscyamine SL (LEVSIN/SL) 0.125 mg SL tablet Take 0.125 mg by mouth every four (4) hours as needed (secretions). 7/29/19   Provider, Historical  
LORazepam (ATIVAN) 0.5 mg tablet Take 0.5 mg by mouth every six (6) hours as needed for Anxiety. 7/29/19   Provider, Historical  
morphine (ROXANOL) 100 mg/5 mL (20 mg/mL) concentrated solution Take 0.25 mL by mouth every three (3) hours as needed (severe pain or shortness of breath). 7/29/19   Provider, Historical  
prochlorperazine (COMPAZINE) 10 mg tablet Take 10 mg by mouth every six (6) hours as needed for Nausea. 7/29/19   Provider, Historical  
bumetanide (BUMEX) 1 mg tablet Take 0.5 mg by mouth two (2) times a day. 7/29/19   Provider, Historical  
senna (SENNA) 8.6 mg tablet Take 2 Tabs by mouth daily. 7/29/19   Provider, Historical  
alclometasone (ACLOVATE) 0.05 % topical cream Apply  to affected area two (2) times a day. apply thin layer as directed to cancerous spot 7/26/19   Karuna Ferrari, NP  
fluticasone propion-salmeterol (ADVAIR/WIXELA) 500-50 mcg/dose diskus inhaler Take 1 Puff by inhalation every twelve (12) hours. 7/26/19   Bel Whaley NP  
albuterol (PROVENTIL HFA, VENTOLIN HFA, PROAIR HFA) 90 mcg/actuation inhaler Take 2 Puffs by inhalation every four (4) hours as needed for Wheezing. 6/17/19   Bel Whaley NP  
potassium chloride SR (K-TAB) 20 mEq tablet Take 1 Tab by mouth two (2) times a day. 6/17/19   Bel Whaley NP  
acetaminophen (TYLENOL) 325 mg tablet Take 2 Tabs by mouth three (3) times daily as needed for Pain. 6/17/19   Bel Whaley NP  
ketoconazole (NIZORAL) 2 % topical cream Apply  to affected area two (2) times a day.  6/17/19   Bel Whaley NP  
inhalational spacing device (E-Z SPACER) 1 Each by Does Not Apply route as needed (for use of inhaler). 6/17/19   Isabel Molina NP  
OXYGEN-AIR DELIVERY SYSTEMS 2 L by Nasal route continuous. Norberto Anderson MD  
 
 
Patient Active Problem List  
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  
 Acquired hypothyroidism E03.9  Other emphysema ([facility] [address] J43.8  Current smoker F17.200  Ascending aortic aneurysm (HCC) I71.2  Descending aortic aneurysm (HCC) I71.9  Diverticula of colon K57.30  Moderate protein-calorie malnutrition (HCC) E44.0  Anemia D64.9 REVIEW OF SYSTEMS:   
Constitutional: negative fever, negative chills, negative weight loss Eyes:   negative visual changes ENT:   negative sore throat, tongue or lip swelling Respiratory:  negative cough, + dyspnea Cards:  negative for chest pain, palpitations, + lower extremity edema GI:   See HPI 
:  negative for frequency, dysuria Integument:  negative for rash and pruritus Heme:  negative for easy bruising and gum/nose bleeding Musculoskel: negative for myalgias,  back pain and muscle weakness Neuro: negative for headaches, dizziness, vertigo Psych: negative for feelings of anxiety, depression Pertinent Positives:  N/V for two days multiple days ago Objective: VITALS:   
Visit Vitals /51 Pulse 85 Temp 98.4 °F (36.9 °C) Resp 16 Ht 4' 10\" (1.473 m) Wt 89 kg (196 lb 3.4 oz) SpO2 99% BMI 41.01 kg/m² PHYSICAL EXAM:  
General:          Alert, WD, WN, cooperative, no distress, appears stated age. Head:               Normocephalic, without obvious abnormality, atraumatic. Eyes:               Conjunctivae clear and pale, anicteric sclerae. Pupils are equal 
Nose:               Nares normal. No drainage or sinus tenderness. Throat:             Lips, mucosa, and tongue normal.  No Thrush Neck:               Supple, symmetrical,  no adenopathy, thyroid: non tender Back:               Symmetric,  No CVA tenderness. Lungs:             CTA bilaterally. Bilateral expiratory wheezes. On NC. Chest wall:      No tenderness or deformity. No Accessory muscle use. Heart:              Regular rate and rhythm,  no murmur, rub or gallop. Abdomen:        Soft, non-tender. Some distention noted. Bowel sounds normal. No   masses Extremities:     Atraumatic, No cyanosis. + edema. No clubbing Skin:                Texture, turgor normal. No rashes/lesions/jaundice Lymph:            Cervical, supraclavicular normal. 
Psych:             Good insight. Not depressed. Not anxious or agitated. Neurologic:      EOMs intact. No facial asymmetry. No aphasia or slurred speech. Normal                        strength, A/O X 3. LAB DATA REVIEWED:   
Recent Results (from the past 24 hour(s)) CBC WITH AUTOMATED DIFF Collection Time: 09/24/19 12:12 PM  
Result Value Ref Range WBC 6.0 3.4 - 10.8 x10E3/uL  
 RBC 3.10 (L) 3.77 - 5.28 x10E6/uL HGB 6.5 (LL) 11.1 - 15.9 g/dL HCT 20.8 (L) 34.0 - 46.6 % MCV 67 (L) 79 - 97 fL  
 MCH 21.0 (L) 26.6 - 33.0 pg  
 MCHC 31.3 (L) 31.5 - 35.7 g/dL  
 RDW 19.6 (H) 12.3 - 15.4 % PLATELET 465 963 - 505 x10E3/uL NEUTROPHILS 74 Not Estab. % Lymphocytes 15 Not Estab. % MONOCYTES 7 Not Estab. % EOSINOPHILS 2 Not Estab. % BASOPHILS 2 Not Estab. %  
 ABS. NEUTROPHILS 4.4 1.4 - 7.0 x10E3/uL Abs Lymphocytes 0.9 0.7 - 3.1 x10E3/uL  
 ABS. MONOCYTES 0.4 0.1 - 0.9 x10E3/uL  
 ABS. EOSINOPHILS 0.1 0.0 - 0.4 x10E3/uL  
 ABS. BASOPHILS 0.1 0.0 - 0.2 x10E3/uL Hematology comments: Note: METABOLIC PANEL, COMPREHENSIVE Collection Time: 09/24/19 12:12 PM  
Result Value Ref Range Glucose 130 (H) 65 - 99 mg/dL BUN 10 8 - 27 mg/dL Creatinine 0.73 0.57 - 1.00 mg/dL GFR est non-AA 77 >59 mL/min/1.73  GFR est AA 89 >59 mL/min/1.73  
 BUN/Creatinine ratio 14 12 - 28 Sodium 142 134 - 144 mmol/L Potassium 3.4 (L) 3.5 - 5.2 mmol/L Chloride 99 96 - 106 mmol/L  
 CO2 33 (H) 20 - 29 mmol/L Calcium 8.8 8.7 - 10.3 mg/dL Protein, total 4.9 (L) 6.0 - 8.5 g/dL Albumin 2.7 (L) 3.5 - 4.7 g/dL GLOBULIN, TOTAL 2.2 1.5 - 4.5 g/dL A-G Ratio 1.2 1.2 - 2.2 Bilirubin, total 1.4 (H) 0.0 - 1.2 mg/dL Alk. phosphatase 100 39 - 117 IU/L  
 AST (SGOT) 17 0 - 40 IU/L  
 ALT (SGPT) 5 0 - 32 IU/L MAGNESIUM Collection Time: 09/24/19 12:12 PM  
Result Value Ref Range Magnesium 1.5 (L) 1.6 - 2.3 mg/dL NT-PRO BNP Collection Time: 09/24/19 12:12 PM  
Result Value Ref Range PROBNP 339 0 - 738 pg/mL CBC WITH AUTOMATED DIFF Collection Time: 09/24/19  8:24 PM  
Result Value Ref Range WBC 8.4 3.6 - 11.0 K/uL  
 RBC 3.30 (L) 3.80 - 5.20 M/uL HGB 7.0 (L) 11.5 - 16.0 g/dL HCT 24.6 (L) 35.0 - 47.0 % MCV 74.5 (L) 80.0 - 99.0 FL  
 MCH 21.2 (L) 26.0 - 34.0 PG  
 MCHC 28.5 (L) 30.0 - 36.5 g/dL  
 RDW 19.7 (H) 11.5 - 14.5 % PLATELET 475 044 - 907 K/uL MPV 10.9 8.9 - 12.9 FL  
 NRBC 0.0 0  WBC ABSOLUTE NRBC 0.00 0.00 - 0.01 K/uL NEUTROPHILS 69 32 - 75 % LYMPHOCYTES 10 (L) 12 - 49 % MONOCYTES 17 (H) 5 - 13 % EOSINOPHILS 1 0 - 7 % BASOPHILS 2 (H) 0 - 1 % IMMATURE GRANULOCYTES 1 (H) 0.0 - 0.5 % ABS. NEUTROPHILS 5.8 1.8 - 8.0 K/UL  
 ABS. LYMPHOCYTES 0.8 0.8 - 3.5 K/UL  
 ABS. MONOCYTES 1.4 (H) 0.0 - 1.0 K/UL  
 ABS. EOSINOPHILS 0.1 0.0 - 0.4 K/UL  
 ABS. BASOPHILS 0.2 (H) 0.0 - 0.1 K/UL  
 ABS. IMM. GRANS. 0.1 (H) 0.00 - 0.04 K/UL  
 DF AUTOMATED    
 RBC COMMENTS ANISOCYTOSIS 2+ 
    
 RBC COMMENTS HYPOCHROMIA 2+ 
    
 RBC COMMENTS OVALOCYTES PRESENT 
    
 RBC COMMENTS MICROCYTOSIS 
PRESENT 
    
 RBC COMMENTS POLYCHROMASIA PRESENT 
    
METABOLIC PANEL, COMPREHENSIVE Collection Time: 09/24/19  8:24 PM  
Result Value Ref Range  Sodium 142 136 - 145 mmol/L  
 Potassium 3.0 (L) 3.5 - 5.1 mmol/L Chloride 104 97 - 108 mmol/L  
 CO2 35 (H) 21 - 32 mmol/L Anion gap 3 (L) 5 - 15 mmol/L Glucose 109 (H) 65 - 100 mg/dL BUN 13 6 - 20 MG/DL Creatinine 0.95 0.55 - 1.02 MG/DL  
 BUN/Creatinine ratio 14 12 - 20 GFR est AA >60 >60 ml/min/1.73m2 GFR est non-AA 56 (L) >60 ml/min/1.73m2 Calcium 8.2 (L) 8.5 - 10.1 MG/DL Bilirubin, total 1.3 (H) 0.2 - 1.0 MG/DL  
 ALT (SGPT) 8 (L) 12 - 78 U/L  
 AST (SGOT) 20 15 - 37 U/L Alk. phosphatase 129 (H) 45 - 117 U/L Protein, total 6.0 (L) 6.4 - 8.2 g/dL Albumin 2.4 (L) 3.5 - 5.0 g/dL Globulin 3.6 2.0 - 4.0 g/dL A-G Ratio 0.7 (L) 1.1 - 2.2 TYPE & SCREEN Collection Time: 09/24/19  8:24 PM  
Result Value Ref Range Crossmatch Expiration 09/27/2019 ABO/Rh(D) O POSITIVE Antibody screen NEG Unit number [id number] Blood component type RC LR,2 Unit division 00 Status of unit ISSUED Crossmatch result Compatible PROTHROMBIN TIME + INR Collection Time: 09/24/19  8:24 PM  
Result Value Ref Range INR 1.1 0.9 - 1.1 Prothrombin time 11.7 (H) 9.0 - 11.1 sec D DIMER Collection Time: 09/24/19  8:24 PM  
Result Value Ref Range D-dimer 3.03 (H) 0.00 - 0.65 mg/L FEU  
RETICULOCYTE COUNT Collection Time: 09/24/19  8:30 PM  
Result Value Ref Range Reticulocyte count 3.6 (H) 0.7 - 2.1 % Absolute Retic Cnt. 0.1182 (H) 0.0164 - 0.0776 M/ul FOLATE Collection Time: 09/24/19 11:42 PM  
Result Value Ref Range Folate 8.0 5.0 - 21.0 ng/mL MAGNESIUM Collection Time: 09/24/19 11:42 PM  
Result Value Ref Range Magnesium 1.3 (L) 1.6 - 2.4 mg/dL VITAMIN B12 Collection Time: 09/24/19 11:42 PM  
Result Value Ref Range Vitamin B12 502 193 - 986 pg/mL FERRITIN Collection Time: 09/24/19 11:42 PM  
Result Value Ref Range Ferritin 28 26 - 388 NG/ML  
HAPTOGLOBIN Collection Time: 09/24/19 11:42 PM  
Result Value Ref Range Haptoglobin 85 30 - 200 mg/dL IRON PROFILE Collection Time: 09/24/19 11:42 PM  
Result Value Ref Range Iron 16 (L) 35 - 150 ug/dL TIBC 296 250 - 450 ug/dL Iron % saturation 5 (L) 20 - 50 % LD Collection Time: 09/24/19 11:42 PM  
Result Value Ref Range  81 - 246 U/L  
PHOSPHORUS Collection Time: 09/24/19 11:42 PM  
Result Value Ref Range Phosphorus 2.2 (L) 2.6 - 4.7 MG/DL  
EKG, 12 LEAD, INITIAL Collection Time: 09/25/19  1:55 AM  
Result Value Ref Range Ventricular Rate 84 BPM  
 Atrial Rate 84 BPM  
 P-R Interval 168 ms QRS Duration 84 ms Q-T Interval 400 ms QTC Calculation (Bezet) 472 ms Calculated P Axis 6 degrees Calculated R Axis 25 degrees Calculated T Axis 68 degrees Diagnosis Normal sinus rhythm When compared with ECG of 17-MAY-2019 10:10, 
Nonspecific T wave abnormality has replaced inverted T waves in Lateral leads Confirmed by Neo Myrick ([id number]) on 9/25/2019 10:01:11 AM 
  
CBC W/O DIFF Collection Time: 09/25/19  3:41 AM  
Result Value Ref Range WBC 9.0 3.6 - 11.0 K/uL  
 RBC 3.27 (L) 3.80 - 5.20 M/uL HGB 7.4 (L) 11.5 - 16.0 g/dL HCT 24.3 (L) 35.0 - 47.0 % MCV 74.3 (L) 80.0 - 99.0 FL  
 MCH 22.6 (L) 26.0 - 34.0 PG  
 MCHC 30.5 30.0 - 36.5 g/dL RDW 20.2 (H) 11.5 - 14.5 % PLATELET 031 220 - 961 K/uL MPV 10.4 8.9 - 12.9 FL  
 NRBC 0.0 0  WBC ABSOLUTE NRBC 0.00 0.00 - 0.01 K/uL METABOLIC PANEL, BASIC Collection Time: 09/25/19  3:41 AM  
Result Value Ref Range Sodium 141 136 - 145 mmol/L Potassium 3.8 3.5 - 5.1 mmol/L Chloride 104 97 - 108 mmol/L  
 CO2 34 (H) 21 - 32 mmol/L Anion gap 3 (L) 5 - 15 mmol/L Glucose 83 65 - 100 mg/dL BUN 12 6 - 20 MG/DL Creatinine 0.75 0.55 - 1.02 MG/DL  
 BUN/Creatinine ratio 16 12 - 20 GFR est AA >60 >60 ml/min/1.73m2 GFR est non-AA >60 >60 ml/min/1.73m2 Calcium 7.5 (L) 8.5 - 10.1 MG/DL MAGNESIUM  
 Collection Time: 09/25/19  3:41 AM  
Result Value Ref Range Magnesium 1.9 1.6 - 2.4 mg/dL IMAGING RESULTS: 
9/24/2019 EXAM: CTA CHEST W OR W WO CONT 
  
INDICATION: cp hx aneurysm with drop in hemoglobin 
  
COMPARISON: 5/17/2019. 
  
CONTRAST: 100 mL of [id number]. 
  
TECHNIQUE:  
Precontrast  images were obtained to localize the volume for acquisition. Multislice helical CT arteriography was performed from the diaphragm to the 
thoracic inlet during uneventful rapid bolus intravenous contrast 
administration. Lung and soft tissue windows were generated. Coronal and 
sagittal images were generated and 3D post processing consisting of coronal 
maximum intensity images was performed. CT dose reduction was achieved through 
use of a standardized protocol tailored for this examination and automatic 
exposure control for dose modulation. 
  
FINDINGS: 
THYROID: No nodule. MEDIASTINUM: No mass or lymphadenopathy. GAYLA: No mass or lymphadenopathy. THORACIC AORTA: There is a thoracic aortic aneurysm. The ascending aorta 
measures a maximum of 5.9 cm, which is unchanged. The aortic arch measures 3.8 
cm, which is unchanged. The mid descending thoracic aorta measures 4.5 cm, which 
is unchanged. The aorta at the diaphragmatic outlet measures 3.5 cm, which is 
unchanged No evidence of dissection. PULMONARY ARTERIES: Normal in caliber. The pulmonary arteries are adequately 
enhanced. No evidence of pulmonary embolus. TRACHEA/BRONCHI: Patent. ESOPHAGUS: No wall thickening or dilatation. HEART: There is narrowing of the left ventricle secondary to the aortic 
aneurysm. PLEURA: There are increased moderate left and mild right pleural effusions. LUNGS: There is bibasilar atelectasis, greater on the left. There is a small 
area of irregular opacification again seen in the anterior left upper lobe 
measuring 1.4 cm. This is semisolid with areas of lobulation. INCIDENTALLY IMAGED UPPER ABDOMEN: The liver is small in size. There is relative 
prominence of left lateral hepatic lobe and caudate lobe. This is known as a 
cirrhotic morphology. There is subtle nodularity along the capsule. The patient 
status post cholecystectomy. BONES: No destructive bone lesion.  
  
IMPRESSION:  
1. Unchanged thoracic aortic aneurysm. . 
2. Increased moderate left and mild right pleural effusions with associated 
increased bibasilar atelectasis, greater on the left. 3. Unchanged irregular semisolid area of opacification in the anterior left 
upper lobe measuring 1.4 cm. Recommend follow-up chest CT in 6 months. 4. Hepatic cirrhosis I have personally reviewed the imaging reports Total time spent with patient: 50 minutes ________________________________________________________________________ Care Plan discussed with: 
Patient Y Family RN Consultant:    
 
CT  9/25/2019: 
________________________________________________________________________ 
 
___________________________________________________ Consulting Provider: Natalie Christensen NP  
   9/25/2019  11:59 AM

## 2019-09-25 NOTE — PROGRESS NOTES
Hospitalist Progress Note NAME: Kelly Dash :  1935 MRN:  [id number] Assessment / Plan: Anemia POA Cirrhosis as per CT ? Iron deficiency S/p 1 unit PRBC Will give IV iron GI to see Anasarca ? check Echo again Gi opinion On lasix 
 
  
Hypokalemia, hypomagnesemia: 
-replaced 
  
COPD: mild bronchospasm on exam today 
-schedule inhaled bronchodilators 
-avoid steroids for now On 2.5 L o2 at home 
 
  
Chronic hypoxic respiratory failure: stable 
  
Ascending and Descending Thoracic aneurysm POA Stable so far, But 5.9 cm, will have cardiac surgery evalv. 
 
  
 
 
40 or above Morbid obesity / Body mass index is 41.01 kg/m². Code status: DNR Recommended Disposition:  PT, OT, RN Subjective: Chief Complaint / Reason for Physician Visit \"she wants to go home. Denies any chest pain, sob, palpitations\". Discussed with RN events overnight. Review of Systems: 
Symptom Y/N Comments  Symptom Y/N Comments Fever/Chills n   Chest Pain n   
Poor Appetite n   Edema y Cough n   Abdominal Pain n   
Sputum n   Joint Pain n   
SOB/MADERA y   Pruritis/Rash Nausea/vomit n   Tolerating PT/OT Diarrhea    Tolerating Diet Constipation    Other Could NOT obtain due to:   
 
Objective: VITALS:  
Last 24hrs VS reviewed since prior progress note. Most recent are: 
Patient Vitals for the past 24 hrs: 
 Temp Pulse Resp BP SpO2  
19 1148     99 % 19 0838 98.4 °F (36.9 °C) 85 16 106/51 95 % 19 0743     97 % 19 0627  80     
19 0404 98.6 °F (37 °C) 85 16 121/55 95 % 19 0345  81  121/55 97 % 19 0330  81  126/51 97 % 19 0315  81  118/49 97 % 19 0300  81  125/50 99 % 19 0215 98.1 °F (36.7 °C) 81 20 133/54 98 %  
19 0207 98.1 °F (36.7 °C) 82 20 141/63 98 %  
19 0149 98.1 °F (36.7 °C)      
19 0146  82 21 140/67 97 % 19 0115  81 21 142/61 96 % 09/24/19 2345  85 23 142/66 97 % 09/24/19 2245  82 29 124/56 98 %  
09/24/19 1910 98.6 °F (37 °C) (!) 103 20 143/64 97 % Intake/Output Summary (Last 24 hours) at 9/25/2019 1208 Last data filed at 9/25/2019 4407 Gross per 24 hour Intake 465 ml Output 1600 ml Net -1135 ml PHYSICAL EXAM: 
General: WD, WN. Alert, cooperative, no acute distress   
EENT:  EOMI. Anicteric sclerae. MMM Resp:             Wheezing b/l, expiratory CV:  Regular  rhythm,  2+ pedal edema b/l GI:  Soft, Non distended, Non tender.  +Bowel sounds Neurologic:  Alert and oriented X 3, normal speech, Psych:   Fair insight. Not anxious nor agitated Skin:  No rashes. No jaundice Reviewed most current lab test results and cultures  YES Reviewed most current radiology test results   YES Review and summation of old records today    NO Reviewed patient's current orders and MAR    YES 
PMH/ reviewed - no change compared to H&P 
________________________________________________________________________ Care Plan discussed with: 
  Comments Patient x Family RN x Care Manager Consultant Multidiciplinary team rounds were held today with , nursing, pharmacist and clinical coordinator. Patient's plan of care was discussed; medications were reviewed and discharge planning was addressed. ________________________________________________________________________ Total NON critical care TIME:  50   Minutes Total CRITICAL CARE TIME Spent:   Minutes non procedure based Comments >50% of visit spent in counseling and coordination of care    
________________________________________________________________________ Meryl Lacy MD  
 
Procedures: see electronic medical records for all procedures/Xrays and details which were not copied into this note but were reviewed prior to creation of Plan. LABS: 
I reviewed today's most current labs and imaging studies. Pertinent labs include: 
Recent Labs  
  09/25/19 
0341 09/24/19 2024 09/24/19 
1212 WBC 9.0 8.4 6.0 HGB 7.4* 7.0* 6.5* HCT 24.3* 24.6* 20.8*  245 226 Recent Labs  
  09/25/19 
0341 09/24/19 
2342 09/24/19 2024 09/24/19 
1212   --  142 142  
K 3.8  --  3.0* 3.4*  
  --  104 99 CO2 34*  --  35* 33* GLU 83  --  109* 130* BUN 12  --  13 10 CREA 0.75  --  0.95 0.73 CA 7.5*  --  8.2* 8.8 MG 1.9 1.3*  --  1.5* PHOS  --  2.2*  --   --   
ALB  --   --  2.4* 2.7* TBILI  --   --  1.3* 1.4* SGOT  --   --  20 17 ALT  --   --  8* 5 INR  --   --  1.1  --   
 
 
Signed: Harvin Holter, MD

## 2019-09-25 NOTE — ED NOTES
Report received from Nicolas Herron, [facility]. SBAR, Valentino, ED Summary, MAR and Recent Results was discussed.  
 
Sara Carrillo RN

## 2019-09-25 NOTE — H&P
HISTORY AND PHYSICAL 
 
 
PCP: Umair Mendenhall NP History source: the patient, her daughter, ER 
 
 
CC: low hemoglobin HPI: 82 y.o lady w/ COPD, chronic hypoxic respiratory failure, who presents with abnormal labs, specifically a hemoglobin of 6.5 today. She doesn't offer any acute complaints; she denies chest pain (apparently had left breast pain earlier that resolved), n/v/d, new dyspnea (has chronic dyspnea due to COPD), bleeding, fever, abdominal pain. Her daughter states that she's been wheezing frequently but the patient denies this. She does report chronic lower-extremity swelling for the last two months which she takes bumex for. No PND or orthopnea. PMH/PSH: 
Past Medical History:  
Diagnosis Date  Abdominal pain   
 nausea, bloating, frequent indigestion, ulcers  Arthritis  Cancer (HCC)   
 squamous cell - leg  Chronic pain  COPD  Dyspepsia and other specified disorders of function of stomach  Easy bruising  Hypercholesterolemia  Osteoporosis  Other ill-defined conditions(799.89)   
 blood transfusion with miscarriage  Other ill-defined conditions(799.89)   
 gout  Other ill-defined conditions(799.89) 1/13  
 hematuria, being evaluated by Dr Elliott Chacon  Thyroid disease Past Surgical History:  
Procedure Laterality Date  ABDOMEN SURGERY PROC UNLISTED    
 colon resection  HX APPENDECTOMY  HX BREAST BIOPSY    
 bilateral  
 HX CHOLECYSTECTOMY  11/26/12  
 - LAPAROSOCPIC CHOLECYSTECTOMY WITH GRAMS POSSIBLE OPEN  
 HX DILATION AND CURETTAGE    
 HX GI    
 colon surgery [address]  
 hysterectomy-right ovary removed  HX HEART CATHETERIZATION    
 HX HEENT    
 tonsillectomy  HX ORTHOPAEDIC    
 carpal tunnel-left  AR EGD BALLOON DILATION ESOPHAGUS <30 MM DIAM  1/21/2013  AR EGD TRANSORAL BIOPSY SINGLE/MULTIPLE  1/21/2013  AR ERCP W/SPHINCTEROTOMY/PAPILLOTOMY  5/10/2013 Home meds: Prior to Admission medications Medication Sig Start Date End Date Taking? Authorizing Provider  
albuterol (ACCUNEB) 0.63 mg/3 mL nebulizer solution 3 mL by Nebulization route three (3) times daily as needed for Wheezing for up to 90 days. 8/20/19 11/18/19  Juan Powell, NP  
acetaminophen (TYLENOL) 325 mg tablet Take 650 mg by mouth every four (4) hours as needed (minor pain). 7/29/19   Provider, Historical  
aspirin delayed-release 81 mg tablet Take 81 mg by mouth daily. 7/29/19   Provider, Historical  
acetaminophen (TYLENOL) 650 mg suppository Insert 650 mg into rectum every six (6) hours as needed for Fever (if unable to take PO). 7/29/19   Provider, Historical  
bisacodyl (DULCOLAX) 10 mg suppository Insert 10 mg into rectum daily as needed (no bowel movement for >3 days). 7/29/19   Provider, Historical  
haloperidol (HALDOL) 2 mg/mL oral concentrate Take 0.5 mL by mouth four (4) times daily as needed (agitation). 7/29/19   Provider, Historical  
hyoscyamine SL (LEVSIN/SL) 0.125 mg SL tablet Take 0.125 mg by mouth every four (4) hours as needed (secretions). 7/29/19   Provider, Historical  
LORazepam (ATIVAN) 0.5 mg tablet Take 0.5 mg by mouth every six (6) hours as needed for Anxiety. 7/29/19   Provider, Historical  
morphine (ROXANOL) 100 mg/5 mL (20 mg/mL) concentrated solution Take 0.25 mL by mouth every three (3) hours as needed (severe pain or shortness of breath). 7/29/19   Provider, Historical  
prochlorperazine (COMPAZINE) 10 mg tablet Take 10 mg by mouth every six (6) hours as needed for Nausea. 7/29/19   Provider, Historical  
bumetanide (BUMEX) 1 mg tablet Take 0.5 mg by mouth two (2) times a day. 7/29/19   Provider, Historical  
senna (SENNA) 8.6 mg tablet Take 2 Tabs by mouth daily. 7/29/19   Provider, Historical  
alclometasone (ACLOVATE) 0.05 % topical cream Apply  to affected area two (2) times a day.  apply thin layer as directed to cancerous spot 7/26/19   Juan Powell NP  
 fluticasone propion-salmeterol (ADVAIR/WIXELA) 500-50 mcg/dose diskus inhaler Take 1 Puff by inhalation every twelve (12) hours. 7/26/19   Marvin Hair, NP  
albuterol (PROVENTIL HFA, VENTOLIN HFA, PROAIR HFA) 90 mcg/actuation inhaler Take 2 Puffs by inhalation every four (4) hours as needed for Wheezing. 6/17/19   Marvin Hair, NP  
potassium chloride SR (K-TAB) 20 mEq tablet Take 1 Tab by mouth two (2) times a day. 6/17/19   Marvin Hair, NP  
acetaminophen (TYLENOL) 325 mg tablet Take 2 Tabs by mouth three (3) times daily as needed for Pain. 6/17/19   Marvin Hair, NP  
ketoconazole (NIZORAL) 2 % topical cream Apply  to affected area two (2) times a day. 6/17/19   Marvin Hair, NP  
inhalational spacing device (E-Z SPACER) 1 Each by Does Not Apply route as needed (for use of inhaler). 6/17/19   Marvin Hair, NP  
OXYGEN-AIR DELIVERY SYSTEMS 2 L by Nasal route continuous. Aden Giles MD  
 
 
Allergies: Allergies Allergen Reactions  Ciprofloxacin Other (comments) \"high feeling\"  Codeine Other (comments)  
  hallucinations  Cortisone Other (comments) Suicidal thoughts  Nsaids (Non-Steroidal Anti-Inflammatory Drug) Other (comments) Ulcer history  Other Medication Palpitations Iodine dye  Oxycodone Other (comments) Makes pt feel \"high\"  Penicillins Other (comments) Weak feeling  Vytorin 10-10 [Ezetimibe-Simvastatin] Other (comments) Abdominal swelling  Zantac [Ranitidine Hcl] Other (comments) Blisters FH: 
Family History Problem Relation Age of Onset  Heart Disease Mother  Cancer Father   
     lung  Diabetes Father  Diabetes Son SH: Social History Tobacco Use  Smoking status: Current Every Day Smoker Packs/day: 0.50 Years: 65.00 Pack years: 32.50  Smokeless tobacco: Never Used  Tobacco comment: currently about 1 cigarette a day Substance Use Topics  Alcohol use:  No  
 
 
 ROS: A comprehensive review of systems was negative except for that written in the HPI. PHYSICAL EXAM: 
Visit Vitals /56 Pulse 82 Temp 98.6 °F (37 °C) Resp 29 Ht 4' 10\" (1.473 m) Wt 89 kg (196 lb 3.4 oz) SpO2 98% BMI 41.01 kg/m² Gen: NAD, non-toxic HEENT: anicteric sclerae, pale conjunctiva, oropharynx clear, MM moist 
Neck: supple, trachea midline, no adenopathy Heart: RRR, no MRG, no JVD, 2+ b/l LE edema Lungs: bilateral expiratory wheezing, diminished breath sounds in both bases, non-labored respirations on O2 Abd: soft, NT, ND, BS+ Extr: warm Skin: dry, no rash Neuro: CN II-XII grossly intact, normal speech, moves all extremities Psych: normal mood, appropriate affect Labs/Imaging: 
Recent Results (from the past 24 hour(s)) CBC WITH AUTOMATED DIFF Collection Time: 09/24/19 12:12 PM  
Result Value Ref Range WBC 6.0 3.4 - 10.8 x10E3/uL  
 RBC 3.10 (L) 3.77 - 5.28 x10E6/uL HGB 6.5 (LL) 11.1 - 15.9 g/dL HCT 20.8 (L) 34.0 - 46.6 % MCV 67 (L) 79 - 97 fL  
 MCH 21.0 (L) 26.6 - 33.0 pg  
 MCHC 31.3 (L) 31.5 - 35.7 g/dL  
 RDW 19.6 (H) 12.3 - 15.4 % PLATELET 763 198 - 979 x10E3/uL NEUTROPHILS 74 Not Estab. % Lymphocytes 15 Not Estab. % MONOCYTES 7 Not Estab. % EOSINOPHILS 2 Not Estab. % BASOPHILS 2 Not Estab. %  
 ABS. NEUTROPHILS 4.4 1.4 - 7.0 x10E3/uL Abs Lymphocytes 0.9 0.7 - 3.1 x10E3/uL  
 ABS. MONOCYTES 0.4 0.1 - 0.9 x10E3/uL  
 ABS. EOSINOPHILS 0.1 0.0 - 0.4 x10E3/uL  
 ABS. BASOPHILS 0.1 0.0 - 0.2 x10E3/uL Hematology comments: Note: METABOLIC PANEL, COMPREHENSIVE Collection Time: 09/24/19 12:12 PM  
Result Value Ref Range Glucose 130 (H) 65 - 99 mg/dL BUN 10 8 - 27 mg/dL Creatinine 0.73 0.57 - 1.00 mg/dL GFR est non-AA 77 >59 mL/min/1.73 GFR est AA 89 >59 mL/min/1.73  
 BUN/Creatinine ratio 14 12 - 28 Sodium 142 134 - 144 mmol/L Potassium 3.4 (L) 3.5 - 5.2 mmol/L  Chloride 99 96 - 106 mmol/L  
 CO2 33 (H) 20 - 29 mmol/L Calcium 8.8 8.7 - 10.3 mg/dL Protein, total 4.9 (L) 6.0 - 8.5 g/dL Albumin 2.7 (L) 3.5 - 4.7 g/dL GLOBULIN, TOTAL 2.2 1.5 - 4.5 g/dL A-G Ratio 1.2 1.2 - 2.2 Bilirubin, total 1.4 (H) 0.0 - 1.2 mg/dL Alk. phosphatase 100 39 - 117 IU/L  
 AST (SGOT) 17 0 - 40 IU/L  
 ALT (SGPT) 5 0 - 32 IU/L MAGNESIUM Collection Time: 09/24/19 12:12 PM  
Result Value Ref Range Magnesium 1.5 (L) 1.6 - 2.3 mg/dL NT-PRO BNP Collection Time: 09/24/19 12:12 PM  
Result Value Ref Range PROBNP 339 0 - 738 pg/mL CBC WITH AUTOMATED DIFF Collection Time: 09/24/19  8:24 PM  
Result Value Ref Range WBC 8.4 3.6 - 11.0 K/uL  
 RBC 3.30 (L) 3.80 - 5.20 M/uL HGB 7.0 (L) 11.5 - 16.0 g/dL HCT 24.6 (L) 35.0 - 47.0 % MCV 74.5 (L) 80.0 - 99.0 FL  
 MCH 21.2 (L) 26.0 - 34.0 PG  
 MCHC 28.5 (L) 30.0 - 36.5 g/dL  
 RDW 19.7 (H) 11.5 - 14.5 % PLATELET 684 278 - 158 K/uL MPV 10.9 8.9 - 12.9 FL  
 NRBC 0.0 0  WBC ABSOLUTE NRBC 0.00 0.00 - 0.01 K/uL NEUTROPHILS 69 32 - 75 % LYMPHOCYTES 10 (L) 12 - 49 % MONOCYTES 17 (H) 5 - 13 % EOSINOPHILS 1 0 - 7 % BASOPHILS 2 (H) 0 - 1 % IMMATURE GRANULOCYTES 1 (H) 0.0 - 0.5 % ABS. NEUTROPHILS 5.8 1.8 - 8.0 K/UL  
 ABS. LYMPHOCYTES 0.8 0.8 - 3.5 K/UL  
 ABS. MONOCYTES 1.4 (H) 0.0 - 1.0 K/UL  
 ABS. EOSINOPHILS 0.1 0.0 - 0.4 K/UL  
 ABS. BASOPHILS 0.2 (H) 0.0 - 0.1 K/UL  
 ABS. IMM. GRANS. 0.1 (H) 0.00 - 0.04 K/UL  
 DF AUTOMATED    
 RBC COMMENTS ANISOCYTOSIS 2+ 
    
 RBC COMMENTS HYPOCHROMIA 2+ 
    
 RBC COMMENTS OVALOCYTES PRESENT 
    
 RBC COMMENTS MICROCYTOSIS 
PRESENT 
    
 RBC COMMENTS POLYCHROMASIA PRESENT 
    
METABOLIC PANEL, COMPREHENSIVE Collection Time: 09/24/19  8:24 PM  
Result Value Ref Range Sodium 142 136 - 145 mmol/L Potassium 3.0 (L) 3.5 - 5.1 mmol/L Chloride 104 97 - 108 mmol/L  
 CO2 35 (H) 21 - 32 mmol/L  Anion gap 3 (L) 5 - 15 mmol/L  
 Glucose 109 (H) 65 - 100 mg/dL BUN 13 6 - 20 MG/DL Creatinine 0.95 0.55 - 1.02 MG/DL  
 BUN/Creatinine ratio 14 12 - 20 GFR est AA >60 >60 ml/min/1.73m2 GFR est non-AA 56 (L) >60 ml/min/1.73m2 Calcium 8.2 (L) 8.5 - 10.1 MG/DL Bilirubin, total 1.3 (H) 0.2 - 1.0 MG/DL  
 ALT (SGPT) 8 (L) 12 - 78 U/L  
 AST (SGOT) 20 15 - 37 U/L Alk. phosphatase 129 (H) 45 - 117 U/L Protein, total 6.0 (L) 6.4 - 8.2 g/dL Albumin 2.4 (L) 3.5 - 5.0 g/dL Globulin 3.6 2.0 - 4.0 g/dL A-G Ratio 0.7 (L) 1.1 - 2.2 TYPE & SCREEN Collection Time: 09/24/19  8:24 PM  
Result Value Ref Range Crossmatch Expiration 09/27/2019 ABO/Rh(D) O POSITIVE Antibody screen NEG Unit number [id number] Blood component type RC LR,2 Unit division 00 Status of unit ALLOCATED Crossmatch result Compatible PROTHROMBIN TIME + INR Collection Time: 09/24/19  8:24 PM  
Result Value Ref Range INR 1.1 0.9 - 1.1 Prothrombin time 11.7 (H) 9.0 - 11.1 sec D DIMER Collection Time: 09/24/19  8:24 PM  
Result Value Ref Range D-dimer 3.03 (H) 0.00 - 0.65 mg/L FEU Recent Labs  
  09/24/19 2024 09/24/19 
1212 WBC 8.4 6.0 HGB 7.0* 6.5* HCT 24.6* 20.8*  226 Recent Labs  
  09/24/19 2024 09/24/19 
1212  142  
K 3.0* 3.4*  
 99 CO2 35* 33* BUN 13 10 CREA 0.95 0.73 * 130* CA 8.2* 8.8 MG  --  1.5* Recent Labs  
  09/24/19 2024 09/24/19 
1212 SGOT 20 17 ALT 8* 5  
* 100 TBILI 1.3* 1.4* TP 6.0* 4.9* ALB 2.4* 2.7*  
GLOB 3.6  -- No results for input(s): CPK, CKNDX, TROIQ in the last 72 hours. No lab exists for component: CPKMB Recent Labs  
  09/24/19 2024 INR 1.1 PTP 11.7* No results for input(s): PH, PCO2, PO2 in the last 72 hours. Cta Chest W Or W Wo Cont Result Date: 9/24/2019 IMPRESSION: 1. Unchanged thoracic aortic aneurysm. . 2. Increased moderate left and mild right pleural effusions with associated increased bibasilar atelectasis, greater on the left. 3. Unchanged irregular semisolid area of opacification in the anterior left upper lobe measuring 1.4 cm. Recommend follow-up chest CT in 6 months. 4. Hepatic cirrhosis Xr Chest Ethelyn Rumple Result Date: 9/24/2019 Impression: Unchanged mild cardiomegaly and mild left pleural effusion. Assessment & Plan: Anemia: this is subacute, the etiology is not yet known.  
-send stool for occult blood 
-check iron profile, b12, folate, retics, LDH/haptoglobin 
-1 U PRBC in the ER Anasarca: cirrhotic liver morphology noted on CT, the pt is not known to have cirrhosis -IV furosemide with IV albumin 
-consider GI consult Hypokalemia, hypomagnesemia: 
-replete COPD: mild bronchospasm on exam today 
-schedule inhaled bronchodilators 
-avoid steroids for now Chronic hypoxic respiratory failure: stable Abnormal CT chest 
 
DVT ppx: SCDs Code status: DNR Disposition: prior living arrangement when ready Signed By: Eliezer Klein MD   
 September 24, 2019

## 2019-09-25 NOTE — ED NOTES
Pt. Told she has a room upstairs at this time. Pt stating \"I thought I was going home. \"  Pt. Stating she wants to leave AMA. Dr. Redd leong. Awaiting call back.

## 2019-09-25 NOTE — CONSULTS
We have seen this patient in the past and have reviewed her images again today. Please refer back to our May consultation from Dr. Andrea Tabor. Ascending Aortic Aneurysm has not changed since last imaging and patient is a poor surgical candidate. Please reach out to our office should further questions arise. Thank you [facility] [phone]

## 2019-09-25 NOTE — ED NOTES
IVs and tele removed from patient at this time. Discharge instructions given to patient by Dr. Francisco Silvestre. Pt. Stable for discharge home.

## 2019-09-25 NOTE — DISCHARGE INSTRUCTIONS
HOSPITALIST DISCHARGE INSTRUCTIONS    NAME: Jennifer Gonsalves   :  1935   MRN:  [id number]     Date/Time:  2019 3:46 PM    ADMIT DATE: 2019   DISCHARGE DATE: 2019     Anemia POA  Cirrhosis POA  Anasarca  COPD  Chronic hypoxic respiratory failure  Ascending and Descending Thoracic aneurysm     · It is important that you take the medication exactly as they are prescribed. · Keep your medication in the bottles provided by the pharmacist and keep a list of the medication names, dosages, and times to be taken in your wallet. · Do not take other medications without consulting your doctor. What to do at Home    Recommended diet:  Low fat, Low cholesterol    Recommended activity: Activity as tolerated      If you have questions regarding the hospital related prescriptions or hospital related issues please call [facility] at [phone]. You can always direct your questions to your primary care doctor if you are unable to reach your hospital physician; your PCP works as an extension of your hospital doctor just like your hospital doctor is an extension of your PCP for your time at the [facility], [facility])    If you experience any of the following symptoms then please call your primary care physician or return to the emergency room if you cannot get hold of your doctor:    Fever, chills, nausea, vomiting, or persistent diarrhea  Worsening weakness or new problems with your speech or balance  Dark stools or visible blood in your stools  New Leg swelling or shortness of breath as these could be signs of a clot    Additional Instructions:      Bring these papers with you to your follow up appointments. The papers will help your doctors be sure to continue the care plan from the hospital.              Information obtained by :  I understand that if any problems occur once I am at home I am to contact my physician.     I understand and acknowledge receipt of the instructions indicated above.                                                                                                                                            Physician's or R.N.'s Signature                                                                  Date/Time                                                                                                                                              Patient or Representative Signature

## 2019-09-25 NOTE — DISCHARGE SUMMARY
Hospitalist Discharge Summary Patient ID: 
James Clarke 
[id number] 
76 y.o. 
1935 9/24/2019 PCP on record: Avinash Ortiz NP Admit date: 9/24/2019 Discharge date and time: 9/25/2019 DISCHARGE DIAGNOSIS: 
 
Anemia POA Cirrhosis POA Anasarca COPD Chronic hypoxic respiratory failure Ascending and Descending Thoracic aneurysm  
  
 
 
CONSULTATIONS: 
IP CONSULT TO HOSPITALIST 
IP CONSULT TO GASTROENTEROLOGY 
IP CONSULT TO VASCULAR SURGERY Excerpted HPI from H&P of Mejia Castillo MD: 
  
HPI: 82 y.o lady w/ COPD, chronic hypoxic respiratory failure, who presents with abnormal labs, specifically a hemoglobin of 6.5 today. She doesn't offer any acute complaints; she denies chest pain (apparently had left breast pain earlier that resolved), n/v/d, new dyspnea (has chronic dyspnea due to COPD), bleeding, fever, abdominal pain. Her daughter states that she's been wheezing frequently but the patient denies this. She does report chronic lower-extremity swelling for the last two months which she takes bumex for. No PND or orthopnea.  
 
______________________________________________________________________ DISCHARGE SUMMARY/HOSPITAL COURSE:  for full details see H&P, daily progress notes, labs, consult notes. Anemia POA Cirrhosis as per CT Likely iron def S/p IV iron and given Po iron on DC 
  
Anasarca Likely 2/2 right sided heart failure 2/2 COPD, pulmonary HTN 
-She says she has CHF. Has chronic leg edema b/l. On Bumex 0.5 mg at home, increased to BID. 
 
  
Hypokalemia, hypomagnesemia: 
-replaced DC with Supplements 
  
COPD: mild bronchospasm on exam today 
-schedule inhaled bronchodilators 
-avoid steroids for now On 2.5 L o2 at home 
  
  
Chronic hypoxic respiratory failure: stable 
  
Ascending and Descending Thoracic aneurysm POA 
  
Stable so far, But 5.9 cm, She is aware. She does not want further work up. She was with Hospice earlier, but she did not like their service, so switched back to Home health. She insisted to go home, I recommended atleast stay one more midnight, for possible diuresing and awaiting ECHO, and Hb trend. But she wants to go home. She is on 2.5 L NC o2, which is her baseline, not in distress. She will be high risk for Re-admission. _______________________________________________________________________ Patient seen and examined by me on discharge day. Pertinent Findings: 
Gen:    Not in distress Chest: Clear lungs CVS:   Regular rhythm. 2 + pitting edema, Abd:  Soft, not distended, not tender Neuro:  Alert, oreintedx3 
_______________________________________________________________________ DISCHARGE MEDICATIONS:  
Current Discharge Medication List  
  
START taking these medications Details  
pantoprazole (PROTONIX) 40 mg tablet Take 1 Tab by mouth two (2) times a day. Qty: 60 Tab, Refills: 0  
  
magnesium oxide (MAG-OX) 400 mg tablet Take 1 Tab by mouth daily. Qty: 20 Tab, Refills: 0  
  
ferrous sulfate 325 mg (65 mg iron) tablet Take 1 Tab by mouth three (3) times daily (with meals). Qty: 90 Tab, Refills: 0 CONTINUE these medications which have CHANGED Details  
bumetanide (BUMEX) 1 mg tablet Take 0.5 Tabs by mouth two (2) times a day. Qty: 30 Tab, Refills: 0 CONTINUE these medications which have NOT CHANGED Details  
potassium chloride SR (K-TAB) 20 mEq tablet Take 20 mEq by mouth daily. levothyroxine (SYNTHROID) 50 mcg tablet Take 50 mcg by mouth Daily (before breakfast). albuterol (ACCUNEB) 0.63 mg/3 mL nebulizer solution 3 mL by Nebulization route three (3) times daily as needed for Wheezing for up to 90 days. Qty: 270 Nebule, Refills: 1  
  
aspirin delayed-release 81 mg tablet Take 81 mg by mouth daily.   
  
alclometasone (ACLOVATE) 0.05 % topical cream Apply  to affected area two (2) times a day. apply thin layer as directed to cancerous spot Qty: 15 g, Refills: 2  
  
albuterol (PROVENTIL HFA, VENTOLIN HFA, PROAIR HFA) 90 mcg/actuation inhaler Take 2 Puffs by inhalation every four (4) hours as needed for Wheezing. Qty: 1 Inhaler, Refills: 0  
  
senna (SENNA) 8.6 mg tablet Take 2 Tabs by mouth daily. ketoconazole (NIZORAL) 2 % topical cream Apply  to affected area two (2) times a day. Qty: 60 g, Refills: 3 STOP taking these medications  
  
 haloperidol (HALDOL) 2 mg/mL oral concentrate Comments:  
Reason for Stopping:   
   
  
 
 
 
Patient Follow Up Instructions: Activity: Activity as tolerated Diet: Low fat, Low cholesterol Follow-up Information Follow up With Specialties Details Why Contact Danielle Laguna NP Nurse Practitioner In 1 week  [address] 
[phone] 
  
  
 
________________________________________________________________ Risk of deterioration: High 
 
Condition at Discharge:  Stable 
__________________________________________________________________ Disposition Home with family and home health services 
 
____________________________________________________________________ Code Status: DNR/DNI 
___________________________________________________________________ Total time in minutes spent coordinating this discharge (includes going over instructions, follow-up, prescriptions, and preparing report for sign off to her PCP) :  50 minutes Signed: 
Sanaz Martinez MD

## 2019-09-25 NOTE — ED NOTES
Pt. Daughter at bedside. Asking to speak with MD. Hermelinda Zuñiga she is unable to take patient home. Will require ambulance transport.

## 2019-09-25 NOTE — ED NOTES
Pt. [place] Powers not working. Pt. Changed and clean linens applied. Pt. In position of comfort with call bell in reach.

## 2019-09-25 NOTE — ED NOTES
Pt appears to be sleeping at this time. No distress noted. Comfort and safety measures in place. Call light in reach.

## 2019-09-25 NOTE — ED NOTES
Spoke with Dr. Thuy Delgado. OK if patient leaves AMA. Patient states she needs ambulance transport home. Spoke with Karla Wells from [address] Stated patient needs to find her own transportation if leaving AMA. Charge nurse Carly Solis to speak with patient.

## 2019-09-25 NOTE — ED NOTES
Assumed pt care from [facility]. Plan of care discussed and all questions answered. No distress noted at this time. Comfort and safety measures in place. Call light in reach.

## 2019-09-26 NOTE — PATIENT INSTRUCTIONS

## 2019-09-26 NOTE — TELEPHONE ENCOUNTER
Home Based [address] [place] [facility]      Patient discharged on 9/25/19 from [facility]    During admission patient was treated for anemia with a blood transfusion    Patient discharged home with  prescriptions and instruction to follow up with [address] [place] [address] team.  Spoke with Sandeep Kuhn today and she stated that the patient is doing well   Patient denies chest pain, dizziness, nausea, or vomiting. Ms. Glynn Bennett prescriptions have not been filled yet. Daughter will get them today. .      Medication reconciliation completed. Daughter verbalized understanding of medication management. Reviewed plan of care. Patient has provided input to plan and agrees with goals. Develop action plan for Self-Management Chronic Disease. Patient has follow up appointment scheduled with [facility] [place]. Pt schedule to see James Collier NP on 10/2/19. Pt is able to verbalize red flags and reasons to use PCP vs EMS services. Pt has a good support system. Patient has my contact number for further questions or concerns.

## 2019-10-02 NOTE — PROGRESS NOTES
Home Based Primary Care [facility]) & Supportive Services   
[phone] NOTE: Home Based Primary Care is a PROVIDER (MD/NP) based interdisciplinary practice (RN, LCSW, Pharmacy, Dietician) for patient's who cannot access (or have a taxing effort) primary / speciality medical care in an office setting. [facility] is NOT [employer] but works with [employer]. when there is a skilled need. Our MD/NPs are integral in Care Transitions; PLEASE CALL [phone] if this patient arrives in the Emergency Department or Hospital.  
  
 
 
Date of Current Visit: 10/2/19 Patient/Family present for Current Visit: daughter, [place] Goals of Care as stated by the patient/family is: To stay home and avoid going to the hospital if possible Preferences for hospitalization if that were to be necessary: 
[x] Patient DOES NOT WANT hospitalization; focus all treatments at home 
[] Patient WOULD WANT hospitalization for potentially reversible causes Patient prefers hospitalization at: She was given one unit PRBC's. She declined evaluation for a 5.9 cm aneurysm of the ascending and descending thoracid aneurysm. Is this encounter billed on time: Total time:  
Counseling / coordination time:  
> 50% counseling / coordination? ARACELI:  Day # [id number] 9/24 to 9/24/19 Hospital Summary:  Patient sent for a hgb of 6.5. Cirrhosis seen on CT. Hospitalist felt anemia was likely iron deficiency. She received one unit PRBCs and 100 mg of Venofer IV. She declined a w/u for her new diagnosis of a 5.9 cm aneurysm. She was given lasix IV for anasarca, right sided heart failure and then had her home bumex increased to bid. She had potassium and magnesium repleted. The patient refused to stay a second night ASSESSMENT & PLAN Diagnoses and all orders for this visit: 
  
  
Encounter Diagnoses ICD-10-CM ICD-9-CM 1. Ascending aortic aneurysm (HCC) I71.2 441.2 2. Descending aortic aneurysm (HCC) I71.9 441.9 3. Anemia, unspecified type D64.9 285.9 4. Chronic obstructive pulmonary disease, unspecified COPD type ([address] J44.9 496  
5. Anasarca R60.1 782.3 6. Chronic kidney disease, unspecified CKD stage N18.9 585.9 7. Insomnia, unspecified type G47.00 780.52  
1 and 2. Patient has refused an evaluation. 3.  Recheck CBC today per discharge recommendations. Continue po iron. 4.  Stable at present. Patient refusing treatments beyond albuterol. Still smoking. She admits to one cigarette a day. Encouraged cessation. 5.  Much improved. Continue bumex bid. Did not have a gold top to check renal funciton and lytes today. Should be checked at her next visit. 6.  Avoid nephrotoxic drugs. 7.  Melatonin 3 mg at hs Follow-up and Dispositions · Return in about 1 month (around 11/2/2019). I have left a Flex Stringer / Cheryle aHmm Encounter Diagnoses Name Primary?  Ascending aortic aneurysm (HCC) Yes  Descending aortic aneurysm ([address]  Anemia, unspecified type  Chronic obstructive pulmonary disease, unspecified COPD type ([address]  Anasarca  Chronic kidney disease, unspecified CKD stage  Insomnia, unspecified type   
 
 from today's visit with the patient/family in the home HEALTH MAINTENANCE There are no preventive care reminders to display for this patient. CC & SUBJECTIVE including ROS & FUNCTION Chief Complaint Patient presents with  Transitions Of Care Senaida Cheadle is a 82 y.o. with chronic medical conditions as listed in the patient's updated Problem List (see below) Their Subjective Information provided as today's visit includes:  
Patient reports her edema is vastly improved and she urinates briskly after her diuretics. Her daughter reports she is much better today. She is using nebulizer and denies wheezing but is audibly wheezing.   She has not had AM tx yet. She is smoking one cigarette today by her report but room smells smoky. She is eating well. Denies constipation and diarrhea. No increased weakness, dizziness. Her appetite is improved. She is taking her medications as ordered. She c/o insomnia. Her duaghter reports this is a long-term issue. Review of Systems Constitutional: Positive for malaise/fatigue. Negative for chills, diaphoresis, fever and weight loss. HENT: Positive for hearing loss. Negative for congestion and nosebleeds. Eyes: Negative for blurred vision, double vision, photophobia, pain, discharge and redness. Respiratory: Positive for shortness of breath and wheezing. Negative for cough, hemoptysis and sputum production. Chronic SOB and wheezing Cardiovascular: Positive for orthopnea and PND. Negative for chest pain, palpitations, claudication and leg swelling. Sleeps in a recliner Gastrointestinal: Negative for abdominal pain, blood in stool, constipation, diarrhea, heartburn, melena, nausea and vomiting. Genitourinary: Negative for dysuria, flank pain, frequency, hematuria and urgency. Musculoskeletal: Negative for back pain, falls, joint pain, myalgias and neck pain. Skin: Negative for itching and rash. Neurological: Negative for dizziness, tingling, tremors, sensory change, speech change, focal weakness and headaches. Endo/Heme/Allergies: Does not bruise/bleed easily. Psychiatric/Behavioral: Positive for memory loss. Negative for depression, hallucinations, substance abuse and suicidal ideas. The patient has insomnia. The patient is not nervous/anxious. PPS:40-50% Karn[place]sky:  
Timed Up and Go (TUG): unable to complete Fall Risk Assessment, last 12 mths 6/24/2019 Able to walk? No  
Fall in past 12 months? -  
 
 
Current Durable Medical Equipment in Home: 
[x] Hospital Bed 
[x] Bedside Commode 
[x] Wheelchair 
[] Myze 
[x] Kovio [x] Respiratory Support: Continuous oxygen, nebulizer ADVANCE CARE PLANNING The following information was updated I/ reviewed as accurate in Orders and the Advance Care Planning Navigator: Durable DO NOT RESUSCITATE on file Primary Decision MakerVwanda Wick - Daughter - [phone] Secondary Decision Maker: LUIS ANGEL WRIGHT - Son - [phone] Advance Care Planning 8/15/2019 Patient's Healthcare Decision Maker is: Named in scanned ACP document Confirm Advance Directive Yes, on file Does the patient have other document types Do Not Resuscitate VITAL SIGNS & PHYSICAL EXAM  
 
Median Arm Circumference (inches): 
 
35.2 cm ARIADNA on 7/26/19 Wt Readings from Last 3 Encounters:  
09/25/19 196 lb 3.4 oz (89 kg) 07/29/19 197 lb (89.4 kg) 06/24/19 197 lb (89.4 kg) Temp Readings from Last 3 Encounters:  
10/02/19 97.8 °F (36.6 °C)  
09/25/19 98 °F (36.7 °C)  
09/24/19 98.9 °F (37.2 °C) (Temporal) BP Readings from Last 3 Encounters:  
10/02/19 142/68  
09/25/19 110/48  
09/24/19 124/62 Pulse Readings from Last 3 Encounters:  
10/02/19 78  
09/25/19 86  
09/24/19 71  
 
 
(Constitutional) Patient Physical Status:  female, advanced age, sitting upright in recliner chair, no acute distress Pacemaker:N/A 
ICD:N/A Feeding Tube:N/A 
Urine Catheter:N/A Other: 
 
Eyes: pupils equal, anicteric ENMT: no nasal discharge, moist mucous membranes Cardiovascular: regular rhythm, no M/R/G, dependent edema to bilat. Elbows, unable to palpate pedal pulses due to significant pedal edema, +3 BLE edema Respiratory: Wearing nasal cannula, breathing mildly labored with conversation, symmetric,signifcant expiratory wheezes in all lung fields Gastrointestinal: Round, obese abdomen, + bowel sounds, soft, non-tender, non-distended Musculoskeletal: no deformity, no tenderness to palpation Skin: warm, dry, no open wounds rashes or ulcerations.   Dry flaky skin noted to upper and lower extremities. Neurologic: A/Ox3 however short-term memory appears to be impaired, following commands, moving all extremities equally Psychiatric: normal affect, no hallucinations Other: OXYGEN TESTING: 
O2 qualification:  
Pt visibly SOB at rest and with talking O2 sat 85% RA , patient is unable to ambulate After placement of O2 at 2.5L for 3 min Sats increased to 95% PROBLEM LIST / PAST MEDICAL / SOCIAL HX Patient Active Problem List  
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  
 Acquired hypothyroidism E03.9  Other emphysema ([address] J43.8  Current smoker F17.200  Ascending aortic aneurysm (HCC) I71.2  Descending aortic aneurysm (HCC) I71.9  Diverticula of colon K57.30  Moderate protein-calorie malnutrition (HCC) E44.0  Anemia D64.9 Family History Problem Relation Age of Onset  Heart Disease Mother  Cancer Father   
     lung  Diabetes Father  Diabetes Son   
 
Social History Socioeconomic History  Marital status:  Spouse name: Not on file  Number of children: Not on file  Years of education: Not on file  Highest education level: Not on file Tobacco Use  Smoking status: Current Every Day Smoker Packs/day: 0.50 Years: 65.00 Pack years: 32.50  Smokeless tobacco: Never Used  Tobacco comment: currently about 1 cigarette a day Substance and Sexual Activity  Alcohol use: No  
 Drug use: No  
  Types: Prescription, OTC MEDICATIONS & PRESCRIPTIONS Allergies Allergen Reactions  Ciprofloxacin Other (comments) \"high feeling\"  Codeine Other (comments)  
  hallucinations  Cortisone Other (comments) Suicidal thoughts  Nsaids (Non-Steroidal Anti-Inflammatory Drug) Other (comments) Ulcer history  Other Medication Palpitations Iodine dye  Oxycodone Other (comments) Makes pt feel \"high\"  Penicillins Other (comments) Weak feeling  Vytorin 10-10 [Ezetimibe-Simvastatin] Other (comments) Abdominal swelling  Zantac [Ranitidine Hcl] Other (comments) Blisters Current Outpatient Medications Medication Sig  
 melatonin 3 mg tablet Take  by mouth.  alclometasone (ACLOVATE) 0.05 % topical cream Apply  to affected area two (2) times a day. apply thin layer as directed to cancerous spot  potassium chloride SR (K-TAB) 20 mEq tablet Take 20 mEq by mouth two (2) times a day.  levothyroxine (SYNTHROID) 50 mcg tablet Take 50 mcg by mouth Daily (before breakfast).  bumetanide (BUMEX) 1 mg tablet Take 0.5 Tabs by mouth two (2) times a day.  pantoprazole (PROTONIX) 40 mg tablet Take 1 Tab by mouth two (2) times a day.  magnesium oxide (MAG-OX) 400 mg tablet Take 1 Tab by mouth daily.  ferrous sulfate 325 mg (65 mg iron) tablet Take 1 Tab by mouth three (3) times daily (with meals).  aspirin delayed-release 81 mg tablet Take 81 mg by mouth daily.  senna (SENNA) 8.6 mg tablet Take 1 Tab by mouth daily.  albuterol (PROVENTIL HFA, VENTOLIN HFA, PROAIR HFA) 90 mcg/actuation inhaler Take 2 Puffs by inhalation every four (4) hours as needed for Wheezing.  albuterol (ACCUNEB) 0.63 mg/3 mL nebulizer solution 3 mL by Nebulization route three (3) times daily as needed for Wheezing for up to 90 days.  ketoconazole (NIZORAL) 2 % topical cream Apply  to affected area two (2) times a day. No current facility-administered medications for this visit. Medications Ordered Today Medications  alclometasone (ACLOVATE) 0.05 % topical cream  
  Sig: Apply  to affected area two (2) times a day. apply thin layer as directed to cancerous spot Dispense:  15 g Refill:  2  
  
 
 TEST DATA REVIEWED:  
 
Lab Results Component Value Date/Time  Hemoglobin A1c 4.5 05/19/2019 01:34 AM  
 
 No results found for: David Net, MCA2, [address], MCAU, [address], MCALPOCT Lab Results Component Value Date/Time TSH 5.330 (H) 06/24/2019 11:26 AM  
 
No results found for: Mat Елена, Blaine Anderson, Frances Mohs, VD3RIA Lab Results Component Value Date/Time WBC 9.0 09/25/2019 03:41 AM  
 HGB 7.4 (L) 09/25/2019 03:41 AM  
 PLATELET 746 15/14/5056 03:41 AM  
 
Lab Results Component Value Date/Time Sodium 141 09/25/2019 03:41 AM  
 Potassium 3.8 09/25/2019 03:41 AM  
 Chloride 104 09/25/2019 03:41 AM  
 CO2 34 (H) 09/25/2019 03:41 AM  
 BUN 12 09/25/2019 03:41 AM  
 Creatinine 0.75 09/25/2019 03:41 AM  
 Calcium 7.5 (L) 09/25/2019 03:41 AM  
 Magnesium 1.9 09/25/2019 03:41 AM  
 Phosphorus 2.2 (L) 09/24/2019 11:42 PM  
  
Lab Results Component Value Date/Time AST (SGOT) 20 09/24/2019 08:24 PM  
 Alk. phosphatase 129 (H) 09/24/2019 08:24 PM  
 Protein, total 6.0 (L) 09/24/2019 08:24 PM  
 Albumin 2.4 (L) 09/24/2019 08:24 PM  
 Globulin 3.6 09/24/2019 08:24 PM  
 
Lab Results Component Value Date/Time  Iron 16 (L) 09/24/2019 11:42 PM  
 TIBC 296 09/24/2019 11:42 PM  
 Iron % saturation 5 (L) 09/24/2019 11:42 PM  
 Ferritin 28 09/24/2019 11:42 PM

## 2019-10-03 NOTE — PATIENT INSTRUCTIONS
Anemia: Care Instructions Your Care Instructions Anemia is a low level of red blood cells, which carry oxygen throughout your body. Many things can cause anemia. Lack of iron is one of the most common causes. Your body needs iron to make hemoglobin, a substance in red blood cells that carries oxygen from the lungs to your body's cells. Without enough iron, the body produces fewer and smaller red blood cells. As a result, your body's cells do not get enough oxygen, and you feel tired and weak. And you may have trouble concentrating. Bleeding is the most common cause of a lack of iron. You may have heavy menstrual bleeding or bleeding caused by conditions such as ulcers, hemorrhoids, or cancer. Regular use of aspirin or other anti-inflammatory medicines (such as ibuprofen) also can cause bleeding in some people. A lack of iron in your diet also can cause anemia, especially at times when the body needs more iron, such as during pregnancy, infancy, and the teen years. Your doctor may have prescribed iron pills. It may take several months of treatment for your iron levels to return to normal. Your doctor also may suggest that you eat foods that are rich in iron, such as meat and beans. There are many other causes of anemia. It is not always due to a lack of iron. Finding the specific cause of your anemia will help your doctor find the right treatment for you. Follow-up care is a key part of your treatment and safety. Be sure to make and go to all appointments, and call your doctor if you are having problems. It's also a good idea to know your test results and keep a list of the medicines you take. How can you care for yourself at home? · Take your medicines exactly as prescribed. Call your doctor if you think you are having a problem with your medicine. · If your doctor recommends iron pills, take them as directed: ? Try to take the pills on an empty stomach about 1 hour before or 2 hours after meals. But you may need to take iron with food to avoid an upset stomach. ? Do not take antacids or drink milk or caffeine drinks (such as coffee, tea, or cola) at the same time or within 2 hours of the time that you take your iron. They can make it hard for your body to absorb the iron. ? Vitamin C (from food or supplements) helps your body absorb iron. Try taking iron pills with a glass of orange juice or some other food that is high in vitamin C, such as citrus fruits. ? Iron pills may cause stomach problems, such as heartburn, nausea, diarrhea, constipation, and cramps. Be sure to drink plenty of fluids, and include fruits, vegetables, and fiber in your diet each day. Iron pills often make your bowel movements dark or green. ? If you forget to take an iron pill, do not take a double dose of iron the next time you take a pill. ? Keep iron pills out of the reach of small children. An overdose of iron can be very dangerous. · Follow your doctor's advice about eating iron-rich foods. These include red meat, shellfish, poultry, eggs, beans, raisins, whole-grain bread, and leafy green vegetables. · Steam vegetables to help them keep their iron content. When should you call for help? Call 911 anytime you think you may need emergency care. For example, call if: 
  · You have symptoms of a heart attack. These may include: 
? Chest pain or pressure, or a strange feeling in the chest. 
? Sweating. ? Shortness of breath. ? Nausea or vomiting. ? Pain, pressure, or a strange feeling in the back, neck, jaw, or upper belly or in one or both shoulders or arms. ? Lightheadedness or sudden weakness. ? A fast or irregular heartbeat. After you call 911, the  may tell you to chew 1 adult-strength or 2 to 4 low-dose aspirin. Wait for an ambulance. Do not try to drive yourself.  
  · You passed out (lost consciousness).  
 Call your doctor now or seek immediate medical care if:   · You have new or increased shortness of breath.  
  · You are dizzy or lightheaded, or you feel like you may faint.  
  · Your fatigue and weakness continue or get worse.  
  · You have any abnormal bleeding, such as: 
? Nosebleeds. ? Vaginal bleeding that is different (heavier, more frequent, at a different time of the month) than what you are used to. 
? Bloody or black stools, or rectal bleeding. ? Bloody or pink urine.  
 Watch closely for changes in your health, and be sure to contact your doctor if: 
  · You do not get better as expected. Where can you learn more? Go to http://ruddy-anjali.info/. Enter R301 in the search box to learn more about \"Anemia: Care Instructions. \" Current as of: March 28, 2019 Content Version: 12.2 © 1379-2783 [employer]. Care instructions adapted under license by [employer] (which disclaims liability or warranty for this information). If you have questions about a medical condition or this instruction, always ask your healthcare professional. [place] [address] any warranty or liability for your use of this information.

## 2019-10-08 NOTE — TELEPHONE ENCOUNTER
Patient's daughter called. She said her mom is going in and out of reality. She is wondering if her blood is low again and that is creating it. Please call her with advise.

## 2019-10-10 NOTE — PROGRESS NOTES
Patient labs drawn today to recheck CBC. Patient has had some restless evenings. Daughter given melatonin. Advised daughter to keep a log of melatonin doses and the effect it has on patient. Tj Wong can review next visit. Daughter verbalized understanding.     Patient is stable otherwise

## 2019-10-11 NOTE — TELEPHONE ENCOUNTER
----- Message from Carlos Murray NP sent at 10/11/2019  2:50 PM EDT -----  Drop in H/H. Patient will have increase SOB/dyspnea. She will likely need another transfusion should she wish to proceed. She is a poor surgical candidate or for invasive testing. Recommended for hospice.

## 2019-10-11 NOTE — PROGRESS NOTES
Drop in H/H. Patient will have increase SOB/dyspnea. She will likely need another transfusion should she wish to proceed. She is a poor surgical candidate or for invasive testing. Recommended for hospice.

## 2019-10-11 NOTE — TELEPHONE ENCOUNTER
Message given to [facility]. She will consider the options over the weekend.

## 2019-10-14 NOTE — TELEPHONE ENCOUNTER
Patient's daughter called. She would like to know if we are supposed to call ahead before she takes patient into the hospital or what she is supposed to do. Please call her.

## 2019-10-14 NOTE — TELEPHONE ENCOUNTER
Sebastián Escobar will draw labs in 2 weeks. No need for transfusion at this time. Ms. Jamilah Kilgore verbalized understanding.

## 2019-10-14 NOTE — TELEPHONE ENCOUNTER
Daughter would like to know if patient should go to the ER today for a blood transfusion.     Nurse with consult with NP

## 2019-10-28 NOTE — PATIENT INSTRUCTIONS

## 2019-10-28 NOTE — PROGRESS NOTES
[facility] Based [facility]) & Supportive Services   
[phone] NOTE: Home Based Primary Care is a PROVIDER (MD/NP) based interdisciplinary practice (RN, LCSW, Pharmacy, Dietician) for patient's who cannot access (or have a taxing effort) primary / speciality medical care in an office setting. [facility] is NOT [employer] but works with [employer] when there is a skilled need. Our MD/NPs are integral in Care Transitions; PLEASE CALL [phone] if this patient arrives in the Emergency Department or Hospital.  
  
 
 
Date of Current Visit: 10/28/19 Patient/Family present for Current Visit: daughter, [place] Goals of Care as stated by the patient/family is: To stay home and avoid going to the hospital if possible Preferences for hospitalization if that were to be necessary: 
[x] Patient DOES NOT WANT hospitalization; focus all treatments at home 
[] Patient WOULD WANT hospitalization for potentially reversible causes Patient prefers hospitalization at:     
 
Is this encounter billed on time: Total time: 40min + 20min ACP Counseling / coordination time:  
> 50% counseling / coordination? ASSESSMENT & PLAN Diagnoses and all orders for this visit: 
 
1. Moderate protein-calorie malnutrition ([address] -     METABOLIC PANEL, COMPREHENSIVE 
-     MAGNESIUM 
-     REFERRAL TO HOSPICE 
-     DO NOT RESUSCITATE 
 -On-going poor po intake Albumin Latest Ref Range: 3.5 - 5.0 g/dL 2.4 (L) 2. Other emphysema ([address] 
-     REFERRAL TO HOSPICE 
-     DO NOT RESUSCITATE 
- Encourage smoking cessation as she continues to smoke approximately 1 cigarette daily - Remains depending on supplemental O2 
- Unfortunately, she is not medically maximized. This has been her preference despite education and recommendations. Only utilizing albuterol nebulizers. 3. Ascending aortic aneurysm (HCC) &  Descending aortic aneurysm ([address] - BP control /monitoring - Previously deemed a poor surgical candidate by CV surgery 5. Anemia, unspecified type 
-     CBC W/O DIFF 
-     REFERRAL TO HOSPICE 
- On-going discussion of goals as the patient does not want work up nor does she want to continue to go back and forth to the hospital for transfusions - Continue iron supplementation 
- Continue protonix Ref. Range 10/2/2019 12:51 10/10/2019 12:03 HGB Latest Ref Range: 11.1 - 15.9 g/dL 7.8 (L) 7.3 (L) HCT Latest Ref Range: 34.0 - 46.6 % 26.2 (L) 22.5 (L) 6. Hypokalemia -     METABOLIC PANEL, COMPREHENSIVE 
-     REFERRAL TO HOSPICE 7. MCI (mild cognitive impairment) - Worsening impairment 
- Notable slowing to process information, unable to re-state conversation and her understanding - Today, the patient does not have the ability to make complex decision making 8. Advance care planning -     ADVANCE CARE PLANNING FIRST 30 MINS 
- See ACP note Other orders -     pantoprazole (PROTONIX) 40 mg tablet; Take 1 Tab by mouth two (2) times a day. -     alclometasone (ACLOVATE) 0.05 % topical cream; Apply  to affected area two (2) times a day. apply thin layer as directed to cancerous spot Follow-up and Dispositions · Return TBD. I have left a SUMMARY / INSTRUCTIONS from today's visit with the patient/family in the home HEALTH MAINTENANCE There are no preventive care reminders to display for this patient. CC & SUBJECTIVE including ROS & FUNCTION Chief Complaint Patient presents with  Anemia  COPD [address]  Altered mental status Taco Chan is a 82 y.o. with chronic medical conditions as listed in the patient's updated Problem List (see below) Their Subjective Information provided as today's visit includes:  
 
 
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  
Since last enounter, the patient reports a decline in eye site, increasing weakness w/ inability to take more than two steps to the bedside commode and on-going poor appetite. She feels that her breathing feels about the same. Per the daughter, Elsie Cotter, reports increased confusion, progressive weakness, poor po intake with noticeable changes in breathing. The daughter [place] acknowledges the significant decline that she has witnessed over the past 3 weeks since my last encounter. She believes her mother should return to hospice care. The daughter provides an example that her mother needed to use the bedside commode and was unable to transfer and needed significant physical support by her daughter. At this is not her baseline. Review of Systems Constitutional: Positive for malaise/fatigue. Negative for chills, diaphoresis, fever and weight loss. HENT: Positive for hearing loss. Negative for congestion and nosebleeds. Eyes: Negative for blurred vision, double vision, photophobia, pain, discharge and redness. Respiratory: Positive for shortness of breath and wheezing. Negative for cough, hemoptysis and sputum production. Chronic SOB and wheezing Cardiovascular: Positive for orthopnea and PND. Negative for chest pain, palpitations, claudication and leg swelling. Sleeps in a recliner Gastrointestinal: Negative for abdominal pain, blood in stool, constipation, diarrhea, heartburn, melena, nausea and vomiting. Genitourinary: Negative for dysuria, flank pain, frequency, hematuria and urgency. Musculoskeletal: Positive for joint pain. Negative for back pain, falls, myalgias and neck pain. Shoulder pain Skin: Negative for itching and rash. Neurological: Positive for weakness. Negative for dizziness, tingling, tremors, sensory change, speech change, focal weakness and headaches. Endo/Heme/Allergies: Bruises/bleeds easily. Psychiatric/Behavioral: Positive for memory loss.  Negative for depression, hallucinations, substance abuse and suicidal ideas. The patient has insomnia. The patient is not nervous/anxious. PPS:30% Timed Up and Go (TUG): unable to complete Fall Risk Assessment, last 12 mths 6/24/2019 Able to walk? No  
Fall in past 12 months? -  
 
 
Current Durable Medical Equipment in Home: 
[x] Hospital Bed 
[x] Bedside Commode 
[x] Wheelchair 
[] Angelo Nilo 
[x] Walker 
[x] Respiratory Support: Continuous oxygen, nebulizer ADVANCE CARE PLANNING The following information was updated I/ reviewed as accurate in Orders and the Advance Care Planning Navigator: Durable DO NOT RESUSCITATE on file Primary Decision MakerAlisia Abby - Daughter - [phone] Secondary Decision Maker: LUIS ANGEL WRIGHT - Son - [phone] Advance Care Planning 10/28/2019 Patient's Healthcare Decision Maker is: Named in scanned ACP document Confirm Advance Directive Yes, on file Does the patient have other document types Do Not Resuscitate VITAL SIGNS & PHYSICAL EXAM  
 
Median Arm Circumference (inches): 
 
35.2 cm ARIADNA on 7/26/19 Wt Readings from Last 3 Encounters:  
09/25/19 196 lb 3.4 oz (89 kg) 07/29/19 197 lb (89.4 kg) 06/24/19 197 lb (89.4 kg) Temp Readings from Last 3 Encounters:  
10/28/19 97.3 °F (36.3 °C) (Temporal) 10/02/19 97.8 °F (36.6 °C)  
09/25/19 98 °F (36.7 °C) BP Readings from Last 3 Encounters:  
10/28/19 120/67  
10/02/19 142/68  
09/25/19 110/48 Pulse Readings from Last 3 Encounters:  
10/28/19 (!) 58  
10/02/19 78  
09/25/19 86  
 
 
(Constitutional) Patient Physical Status:  female, advanced age, sitting upright in recliner chair, no acute distress Pacemaker:N/A 
ICD:N/A Feeding Tube:N/A 
Urine Catheter:N/A Other: 
 
Physical Exam  
Constitutional:  
visibly ill appearing, frail,  female HENT:  
Head: Normocephalic and atraumatic.   
Right Ear: External ear normal.  
 Left Ear: External ear normal.  
Nose: Nose normal.  
Mouth/Throat: Oropharynx is clear and moist.  
Eyes: Conjunctivae and EOM are normal. No scleral icterus. Neck: Normal range of motion. Neck supple. No JVD present. Cardiovascular: Normal rate, regular rhythm and intact distal pulses. Exam reveals no gallop and no friction rub. Murmur heard. 
+ BLE/pedal edema Pulmonary/Chest: She has wheezes. She has rales. Increased WOB, wearing supplemental NC O2, scattered rhonchi and crackles but then very diminished in BLL L>R Abdominal: Soft. Bowel sounds are normal. She exhibits no distension and no mass. There is no tenderness. Genitourinary:  
Genitourinary Comments: DEFERRED Musculoskeletal: Normal range of motion. She exhibits no deformity. Neurological:  
Awake, notably slow to process and verbalize, confused and unable to articulate information provided or recall discussions had during encounter Skin: She is not diaphoretic. Psychiatric:  
Flat, withdrawn affect, memory impairment noted Vitals reviewed. PROBLEM LIST / PAST MEDICAL / SOCIAL HX Patient Active Problem List  
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  
 Acquired hypothyroidism E03.9  Other emphysema [address] [address] J43.8  Current smoker F17.200  Ascending aortic aneurysm (HCC) I71.2  Descending aortic aneurysm (HCC) I71.9  Diverticula of colon K57.30  Moderate protein-calorie malnutrition (HCC) E44.0  Anemia D64.9 Family History Problem Relation Age of Onset  Heart Disease Mother  Cancer Father   
     lung  Diabetes Father  Diabetes Son   
 
Social History Socioeconomic History  Marital status:  Spouse name: Not on file  Number of children: Not on file  Years of education: Not on file  Highest education level: Not on file Tobacco Use  
  Smoking status: Current Every Day Smoker Packs/day: 0.50 Years: 65.00 Pack years: 32.50  Smokeless tobacco: Never Used  Tobacco comment: currently about 1 cigarette a day Substance and Sexual Activity  Alcohol use: No  
 Drug use: No  
  Types: Prescription, OTC MEDICATIONS & PRESCRIPTIONS Allergies Allergen Reactions  Ciprofloxacin Other (comments) \"high feeling\"  Codeine Other (comments)  
  hallucinations  Cortisone Other (comments) Suicidal thoughts  Nsaids (Non-Steroidal Anti-Inflammatory Drug) Other (comments) Ulcer history  Other Medication Palpitations Iodine dye  Oxycodone Other (comments) Makes pt feel \"high\"  Penicillins Other (comments) Weak feeling  Vytorin 10-10 [Ezetimibe-Simvastatin] Other (comments) Abdominal swelling  Zantac [Ranitidine Hcl] Other (comments) Blisters Current Outpatient Medications Medication Sig  pantoprazole (PROTONIX) 40 mg tablet Take 1 Tab by mouth two (2) times a day.  alclometasone (ACLOVATE) 0.05 % topical cream Apply  to affected area two (2) times a day. apply thin layer as directed to cancerous spot  melatonin 3 mg tablet Take  by mouth.  potassium chloride SR (K-TAB) 20 mEq tablet Take 20 mEq by mouth two (2) times a day.  levothyroxine (SYNTHROID) 50 mcg tablet Take 50 mcg by mouth Daily (before breakfast).  bumetanide (BUMEX) 1 mg tablet Take 0.5 Tabs by mouth two (2) times a day.  magnesium oxide (MAG-OX) 400 mg tablet Take 1 Tab by mouth daily.  ferrous sulfate 325 mg (65 mg iron) tablet Take 1 Tab by mouth three (3) times daily (with meals).  albuterol (ACCUNEB) 0.63 mg/3 mL nebulizer solution 3 mL by Nebulization route three (3) times daily as needed for Wheezing for up to 90 days.  aspirin delayed-release 81 mg tablet Take 81 mg by mouth daily.  senna (SENNA) 8.6 mg tablet Take 1 Tab by mouth daily.  albuterol (PROVENTIL HFA, VENTOLIN HFA, PROAIR HFA) 90 mcg/actuation inhaler Take 2 Puffs by inhalation every four (4) hours as needed for Wheezing.  ketoconazole (NIZORAL) 2 % topical cream Apply  to affected area two (2) times a day. No current facility-administered medications for this visit. Medications Ordered Today Medications  pantoprazole (PROTONIX) 40 mg tablet Sig: Take 1 Tab by mouth two (2) times a day. Dispense:  90 Tab Refill:  0  
 alclometasone (ACLOVATE) 0.05 % topical cream  
  Sig: Apply  to affected area two (2) times a day. apply thin layer as directed to cancerous spot Dispense:  15 g Refill:  2  
  
 
 TEST DATA REVIEWED:  
 
Lab Results Component Value Date/Time Hemoglobin A1c 4.5 05/19/2019 01:34 AM  
 
No results found for: Simona Alvarado, MCA2, MCA3, MCAU, MCAU2, MCALPOCT Lab Results Component Value Date/Time TSH 5.330 (H) 06/24/2019 11:26 AM  
 
No results found for: Alton John, Laura Pelaez, Ashley Mcneill, [id number] Lab Results Component Value Date/Time WBC 6.0 10/10/2019 12:03 PM  
 HGB 7.3 (L) 10/10/2019 12:03 PM  
 PLATELET 850 68/93/6139 12:03 PM  
 
Lab Results Component Value Date/Time Sodium 141 09/25/2019 03:41 AM  
 Potassium 3.8 09/25/2019 03:41 AM  
 Chloride 104 09/25/2019 03:41 AM  
 CO2 34 (H) 09/25/2019 03:41 AM  
 BUN 12 09/25/2019 03:41 AM  
 Creatinine 0.75 09/25/2019 03:41 AM  
 Calcium 7.5 (L) 09/25/2019 03:41 AM  
 Magnesium 1.9 09/25/2019 03:41 AM  
 Phosphorus 2.2 (L) 09/24/2019 11:42 PM  
  
Lab Results Component Value Date/Time AST (SGOT) 20 09/24/2019 08:24 PM  
 Alk. phosphatase 129 (H) 09/24/2019 08:24 PM  
 Protein, total 6.0 (L) 09/24/2019 08:24 PM  
 Albumin 2.4 (L) 09/24/2019 08:24 PM  
 Globulin 3.6 09/24/2019 08:24 PM  
 
Lab Results Component Value Date/Time  Iron 16 (L) 09/24/2019 11:42 PM  
 TIBC 296 09/24/2019 11:42 PM  
 Iron % saturation 5 (L) 09/24/2019 11:42 PM  
 Ferritin 28 09/24/2019 11:42 PM

## 2019-10-28 NOTE — ACP (ADVANCE CARE PLANNING)
Advance Care Planning (ACP) Provider Note - Comprehensive Date of ACP Conversation: 10/28/19 Persons included in Conversation:  patient and POA Length of ACP Conversation in minutes:  20 minutes Authorized Decision Maker (if patient is incapable of making informed decisions): This person is: 
Healthcare Agent/Medical Power of  under Advance Directive Primary Decision MakerKan Tong - [phone] Secondary Decision Maker: LUIS ANGEL WRIGHT - [place] - [phone] General ACP for ALL Patients with Decision Making Capacity: 
 Importance of advance care planning, including choosing a healthcare agent to communicate patient's healthcare decisions if patient lost the ability to make decisions, such as after a sudden illness or accident \"In these circumstances, what matters most to you? \"  Care focused more on comfort or quality of life. \"What, if any, treatments would you want to avoid? \" Would like to avoid hospitalization and stay home Review of Existing Advance Directive: 
Does this advance directive still reflect your preferences? Yes (Provide new form/Refer for assistance in updating) For Serious or Chronic Illness: 
Understanding of medical condition Understanding of CPR, goals and expected outcomes, benefits and burdens discussed. Interventions Provided: 
Reviewed existing Advance Directive Entered DNR order (If yes, complete Durable DNR form) Counseled patient and daughter, [place], on recommendation to transition to hospice services given the significant decline over a short period of time as well as within the goals of the patient to remain at home. She would not want any invasive treatments or work-up.

## 2019-10-30 NOTE — TELEPHONE ENCOUNTER
----- Message from Gabrielle Joiner NP sent at 10/29/2019  7:59 AM EDT -----  H/H remains low but stable. Platelets are low. Continue iron and mag. Protein levels are remain low as well.  Proceed with hospice recommendation as discussed with patient during encounter

## 2019-11-28 NOTE — ED PROVIDER NOTES
EMERGENCY DEPARTMENT HISTORY AND PHYSICAL EXAM 
 
 
Date: 11/28/2019 Patient Name: Lora Ardon Patient Age and Sex: 82 y.o. female History of Presenting Illness Chief Complaint Patient presents with  Shortness of Breath History Provided By: Patient HPI: Lora Ardon  Is an 65-year-old female with past medical history of end-stage COPD presenting today from home via EMS. She is apparently on home hospice and became more dyspneic with loud her breathing sounds tonight. When EMS arrived she was suctioned out and placed on oxygen. She had oxygen saturations in the 80s initially, these improved after O2 and ministration. The patient is unable to provide any further history given altered mental status. She has a durable DNR with her. There are no other complaints, changes, or physical findings at this time. PCP: Alexandria Valenzuela NP No current facility-administered medications on file prior to encounter. Current Outpatient Medications on File Prior to Encounter Medication Sig Dispense Refill  albuterol (ACCUNEB) 0.63 mg/3 mL nebulizer solution 0.63 mg by Nebulization route three (3) times daily as needed for Shortness of Breath (Mild).  bisacodyl (DULCOLAX) 10 mg suppository Insert 10 mg into rectum daily as needed (constipation ).  morphine (ROXANOL) 100 mg/5 mL (20 mg/mL) concentrated solution Take 5 mg by mouth every three (3) hours as needed for Pain (Moderate SOB ). If unrelieved by Albuterol  magnesium oxide (MAG-OX) 400 mg tablet Take 1 Tab by mouth daily. 20 Tab 2  
 aspirin 81 mg chewable tablet Take 81 mg by mouth daily.  potassium chloride (K-DUR, KLOR-CON) 20 mEq tablet Take 20 mEq by mouth two (2) times a day.  ferrous sulfate 325 mg (65 mg iron) tablet Take 325 mg by mouth three (3) times daily (with meals).  bumetanide (BUMEX) 1 mg tablet Take 0.5 mg by mouth two (2) times a day.  levothyroxine (SYNTHROID) 50 mcg tablet Take 50 mcg by mouth daily.  alclometasone (ACLOVATE) 0.05 % ointment Apply 0.01 g to affected area daily as needed for Skin Irritation.  albuterol (VENTOLIN HFA) 90 mcg/actuation inhaler Take 2 Puffs by inhalation every four (4) hours as needed for Wheezing.  Aspirin-Caffeine (BC) 845-65 mg pwpk Take 1 Package by mouth four (4) times daily as needed for Pain (headache).  senna (SENNA) 8.6 mg tablet Take 1 Tab by mouth daily.  acetaminophen (TYLENOL) 650 mg suppository Insert 650 mg into rectum every six (6) hours as needed for Fever.  haloperidol (HALDOL) 2 mg/mL oral concentrate Take 1 mg by mouth every six (6) hours as needed (agitation).  hyoscyamine SL (LEVSIN/SL) 0.125 mg SL tablet Take 0.125 mg by mouth every four (4) hours as needed (secretions).  LORazepam (ATIVAN) 0.5 mg tablet Take 0.5 mg by mouth every six (6) hours as needed for Anxiety.  prochlorperazine (COMPAZINE) 10 mg tablet Take 10 mg by mouth every six (6) hours as needed for Nausea.  OXYGEN-AIR DELIVERY SYSTEMS 2.5 L/min by IntraNASal route continuous.  pantoprazole (PROTONIX) 40 mg tablet Take 1 Tab by mouth two (2) times a day. 90 Tab 0  
 alclometasone (ACLOVATE) 0.05 % topical cream Apply  to affected area two (2) times a day. apply thin layer as directed to cancerous spot 15 g 2  
 levothyroxine (SYNTHROID) 50 mcg tablet Take 1 Tab by mouth Daily (before breakfast). 90 Tab 0  
 melatonin 3 mg tablet Take  by mouth.  potassium chloride SR (K-TAB) 20 mEq tablet Take 20 mEq by mouth two (2) times a day.  bumetanide (BUMEX) 1 mg tablet Take 0.5 Tabs by mouth two (2) times a day. 30 Tab 0  
 ferrous sulfate 325 mg (65 mg iron) tablet Take 1 Tab by mouth three (3) times daily (with meals). 90 Tab 0  
 aspirin delayed-release 81 mg tablet Take 81 mg by mouth daily.  senna (SENNA) 8.6 mg tablet Take 1 Tab by mouth daily.  albuterol (PROVENTIL HFA, VENTOLIN HFA, PROAIR HFA) 90 mcg/actuation inhaler Take 2 Puffs by inhalation every four (4) hours as needed for Wheezing. 1 Inhaler 0  
 ketoconazole (NIZORAL) 2 % topical cream Apply  to affected area two (2) times a day. 60 g 3 Past History Past Medical History: 
Past Medical History:  
Diagnosis Date  Abdominal pain   
 nausea, bloating, frequent indigestion, ulcers  Arthritis  Cancer (HCC)   
 squamous cell - leg  Chronic pain  COPD  Dyspepsia and other specified disorders of function of stomach  Easy bruising  Hypercholesterolemia  Osteoporosis  Other ill-defined conditions(799.89)   
 blood transfusion with miscarriage  Other ill-defined conditions(799.89)   
 gout  Other ill-defined conditions(799.89) 1/13  
 hematuria, being evaluated by Dr Janel Hyman  Thyroid disease Past Surgical History: 
Past Surgical History:  
Procedure Laterality Date  ABDOMEN SURGERY PROC UNLISTED    
 colon resection  HX APPENDECTOMY  HX BREAST BIOPSY    
 bilateral  
 HX CHOLECYSTECTOMY  11/26/12  
 - LAPAROSOCPIC CHOLECYSTECTOMY WITH GRAMS POSSIBLE OPEN  
 HX DILATION AND CURETTAGE    
 HX GI    
 colon surgery [address]  
 hysterectomy-right ovary removed  HX HEART CATHETERIZATION    
 HX HEENT    
 tonsillectomy  HX ORTHOPAEDIC    
 carpal tunnel-left  [place] EGD BALLOON DILATION ESOPHAGUS <30 MM DIAM  1/21/2013  [place] [facility] TRANSORAL BIOPSY SINGLE/MULTIPLE  1/21/2013  [place] ERCP W/SPHINCTEROTOMY/PAPILLOTOMY  5/10/2013 Family History: 
Family History Problem Relation Age of Onset  Heart Disease Mother  Cancer Father   
     lung  Diabetes Father  Diabetes Son   
 
 
Social History: 
Social History Tobacco Use  Smoking status: Current Every Day Smoker Packs/day: 0.50 Years: 65.00 Pack years: 32.50  Smokeless tobacco: Never Used  Tobacco comment: currently about 1 cigarette a day Substance Use Topics  Alcohol use: No  
 Drug use: No  
  Types: Prescription, OTC Allergies: Allergies Allergen Reactions  Ciprofloxacin Other (comments) \"high feeling\"  Codeine Other (comments)  
  hallucinations  Cortisone Other (comments) Suicidal thoughts  Nsaids (Non-Steroidal Anti-Inflammatory Drug) Other (comments) Ulcer history  Other Medication Palpitations Iodine dye  Oxycodone Other (comments) Makes pt feel \"high\"  Penicillins Other (comments) Weak feeling  Vytorin 10-10 [Ezetimibe-Simvastatin] Other (comments) Abdominal swelling  Zantac [Ranitidine Hcl] Other (comments) Blisters Review of Systems Unable to obtain a review of systems given the patient's altered mental status. Physical Exam  
 
Patient Vitals for the past 12 hrs: 
 Temp Pulse Resp BP SpO2  
11/28/19 0500  (!) 102 17 110/61 95 % 11/28/19 0439     95 % 11/28/19 0434 98.7 °F (37.1 °C) (!) 104 17 142/68 95 % 11/28/19 0429  (!) 109 23 (!) 157/110 (!) 87 % General: obtunded, moderate distress Eyes: EOMI, normal conjunctiva ENT: dry mucous membranes. Neck: Active, full ROM of neck. Skin: No rashes. no jaundice Lungs: Equal chest expansion. mild respiratory distress. Diffuse wheezing throughout Heart: tachycardic with a  regular rhythm     no peripheral edema Abd:  non distended soft Back: Full ROM 
MSK: Full, active ROM in all 4 extremities. Neuro: alert  Person, Place, normal speech;  
Psych: Cooperative with exam; Appropriate mood and affect Diagnostic Study Results Labs - No results found for this or any previous visit (from the past 12 hour(s)). Radiologic Studies - No orders to display CT Results  (Last 48 hours) None CXR Results  (Last 48 hours) None Medical Decision Making Differential Diagnosis: COPD exacerbation, pneumonia, hypercapnia, 
 
I reviewed the vital signs, available nursing notes, past medical history, past surgical history, family history and social history and old medical records. Management/ED course: Patient presents from home with altered mental status and hypoxia. She is actually on hospice and has end-stage COPD/emphysema. The patient did become more aware when she was in the emergency department. We offered her labs, and morphine for her air hunger, and other work-up, and she states that she does not wish to have any of these and would prefer to go home. I called the patient's daughter and we spoke about this. I told her that her mother may have been close to dying tonight, but she has made her wishes clear. The daughter understands, I also spoke with Scottie Betancur with the home hospice team who is aware of the case, and agrees with plan. Patient is discharged back home. Dispo: Discharged. The patient has been re-evaluated and is ready for discharge. Reviewed available results with patient. Counseled patient on diagnosis and care plan. Patient has expressed understanding, and all questions have been answered. Patient agrees with plan and agrees to follow up as recommended, or to return to the ED if their symptoms worsen. Discharge instructions have been provided and explained to the patient, along with reasons to return to the ED. PLAN: 
Current Discharge Medication List  
  
 
2. Follow-up Information Follow up With Specialties Details Why Contact Info Ruben Walls NP Nurse Practitioner Call  As needed [address] [facility] [address] [facility] [id number] 
[phone] 3. Return to ED if worse Diagnosis Clinical Impression: 1. Hypoxia 2. Altered mental status, unspecified altered mental status type Attestations: 
 
Jasbir Day MD

## 2019-11-28 NOTE — ED NOTES
Assumed care of patient from [id number] Jennifer Heeia, RN. Patient resting in bed at this time. Awaiting for transport home.

## 2019-11-28 NOTE — ED NOTES
Pt presents via EMS c/o SOB and hypoxia 77% RA for EMS. Pt was put on hospice last night and is DNR - family requested to have pt transported to hospital.  Pt was given 20mg morphine for the first time last night around 1700. Pt groans to verbal stimulation, labored breathing with abdominal muscle use. Dr. Raina Albert @ bedside.   Family and hospice RN en route to hospital.

## 2019-11-28 NOTE — ED NOTES
Dr. Rios Kay discussed plan of care with both hospice RN and pts daughter - both are agreeable to sending the patient back home per her wishes. EMS transport called - ETA within the hour.

## 2019-11-28 NOTE — ED NOTES
RN asked the patient again if she would like the morphine for comfort. Pt sts \"I don't want it. I don't want anything. I don't want to be sick\". RN stated verified orientation with the patient, stating that she was in the hospital because her family was concerned about her labored and difficulty breathing. When RN asked the patient if she wanted to come to the hospital to be treated, the patient stated \"No, I want to be home\". Dr. Bry Fall aware.

## 2019-12-02 ENCOUNTER — HOME CARE VISIT (OUTPATIENT)
Dept: HOSPICE | Facility: HOSPICE | Age: 84
End: 2019-12-02
Payer: MEDICARE

## 2019-12-06 VITALS — HEART RATE: 122 BPM | RESPIRATION RATE: 40 BRPM | OXYGEN SATURATION: 68 %
